# Patient Record
Sex: FEMALE | Race: WHITE | NOT HISPANIC OR LATINO | Employment: OTHER | URBAN - METROPOLITAN AREA
[De-identification: names, ages, dates, MRNs, and addresses within clinical notes are randomized per-mention and may not be internally consistent; named-entity substitution may affect disease eponyms.]

---

## 2017-01-13 ENCOUNTER — TRANSCRIBE ORDERS (OUTPATIENT)
Dept: ADMINISTRATIVE | Facility: HOSPITAL | Age: 62
End: 2017-01-13

## 2017-01-13 DIAGNOSIS — Z13.9 SCREENING: Primary | ICD-10-CM

## 2017-01-19 ENCOUNTER — HOSPITAL ENCOUNTER (OUTPATIENT)
Dept: CARDIOLOGY | Facility: HOSPITAL | Age: 62
Discharge: HOME | End: 2017-01-19
Admitting: INTERNAL MEDICINE

## 2017-01-19 VITALS
HEIGHT: 63 IN | BODY MASS INDEX: 40.93 KG/M2 | DIASTOLIC BLOOD PRESSURE: 84 MMHG | SYSTOLIC BLOOD PRESSURE: 153 MMHG | HEART RATE: 56 BPM | WEIGHT: 231 LBS

## 2017-01-19 DIAGNOSIS — Z13.9 SCREENING: ICD-10-CM

## 2017-01-19 LAB
BH CV ECHO MEAS - DIST AO DIAM: 1.51 CM
BH CV VAS BP LEFT ARM: NORMAL MMHG
BH CV VAS BP RIGHT ARM: NORMAL MMHG
BH CV XLRA MEAS - MID AO DIAM: 1.69 CM
BH CV XLRA MEAS - PAD LEFT ABI DP: 1.07
BH CV XLRA MEAS - PAD LEFT ABI PT: 1.09
BH CV XLRA MEAS - PAD LEFT ARM: 153 MMHG
BH CV XLRA MEAS - PAD LEFT LEG DP: 164 MMHG
BH CV XLRA MEAS - PAD LEFT LEG PT: 168 MMHG
BH CV XLRA MEAS - PAD RIGHT ABI DP: 1.08
BH CV XLRA MEAS - PAD RIGHT ABI PT: 1.11
BH CV XLRA MEAS - PAD RIGHT ARM: 153 MMHG
BH CV XLRA MEAS - PAD RIGHT LEG DP: 166 MMHG
BH CV XLRA MEAS - PAD RIGHT LEG PT: 170 MMHG
BH CV XLRA MEAS - PROX AO DIAM: 1.99 CM
BH CV XLRA MEAS LEFT ICA/CCA RATIO: 1.34
BH CV XLRA MEAS LEFT MID CCA PSV: NORMAL CM/SEC
BH CV XLRA MEAS LEFT MID ICA PSV: NORMAL CM/SEC
BH CV XLRA MEAS LEFT PROX ECA PSV: NORMAL CM/SEC
BH CV XLRA MEAS RIGHT ICA/CCA RATIO: 0.98
BH CV XLRA MEAS RIGHT MID CCA PSV: NORMAL CM/SEC
BH CV XLRA MEAS RIGHT MID ICA PSV: NORMAL CM/SEC
BH CV XLRA MEAS RIGHT PROX ECA PSV: NORMAL CM/SEC

## 2017-01-19 PROCEDURE — 93799 UNLISTED CV SVC/PROCEDURE: CPT

## 2017-02-16 ENCOUNTER — APPOINTMENT (OUTPATIENT)
Dept: WOMENS IMAGING | Facility: HOSPITAL | Age: 62
End: 2017-02-16

## 2017-02-16 PROCEDURE — 77067 SCR MAMMO BI INCL CAD: CPT | Performed by: RADIOLOGY

## 2017-08-21 ENCOUNTER — HOSPITAL ENCOUNTER (OUTPATIENT)
Dept: SLEEP MEDICINE | Facility: HOSPITAL | Age: 62
Discharge: HOME | End: 2017-08-21
Admitting: INTERNAL MEDICINE

## 2017-08-21 PROCEDURE — G0463 HOSPITAL OUTPT CLINIC VISIT: HCPCS

## 2017-08-27 NOTE — PROGRESS NOTES
Sleep Disorder Follow Up    Patient Name: Kelle Soliz  Age/Sex: 62 y.o. female  : 1955  MRN: [id number]    Date of Encounter Visit: 17  Referring Provider: Ame Dempsey MD  Place of Service: [facility]  Patient Care Team:  Ame Dempsey MD as PCP - General (Internal Medicine)  Kristie Johnson MD as Consulting Physician (Obstetrics and Gynecology)    PROBLEM LIST:  1. Obstructive sleep apnea, mild with severe positional component  2. Excessive daytime sleepiness improved on CPAP  3. Hypertension  4. Obesity with 10 pound weight gain    History of Present Illness:  Kelle Soliz is a 62 y.o. female who was last seen in the office on 2016 for follow-up on obstructive sleep apnea, hypersomnia, hypertension and obesity.  Since last visit she did have her blood pressure medication adjusted by her primary care physician.  She is also on antidepressant.   Lately she has had some issues with sinus infection and has had it problems wearing her CPAP machine.  Patient is on CPAP and uses it every night with no complaints from the mask or the pressure.  Patient uses a nasal mask and reports that it fits well. Patient denies any air leak or excessive dry mouth.   Patient's equipment supplier is [employer].  Patient sleeps better and has a deeper sleep with the CPAP and feels more energy. during the day time.  Current CPAP setting auto 6-20 cm H20.  ESS is 8.  Compliance download was NOT available for review today.  Weight is up 10 pounds since last visit.  Other comorbidities include hypertension and obesity     Review of Systems:   A ten-system review was conducted and was negative.   Please refer to the follow up sleep questionnaire page one for details.    Past Medical History:  Past medical, surgical, social, and family history, except as mentioned above, was unchanged from the last visit.     Past Medical History:   Diagnosis Date   • Hyperlipidemia    • Hypertension         No past surgical history on file.    Home Medications:   No current outpatient prescriptions on file.    Allergies:  Allergies not on file    Past Social History:  Social History     Social History   • Marital status:      Spouse name: N/A   • Number of children: N/A   • Years of education: N/A     Social History Main Topics   • Smoking status: Never Smoker   • Smokeless tobacco: Not on file   • Alcohol use Not on file   • Drug use: Not on file   • Sexual activity: Not on file     Other Topics Concern   • Not on file     Social History Narrative   • No narrative on file       Past Family History:  Family History   Problem Relation Age of Onset   • Heart disease Mother    • Hypertension Mother    • Diabetes Mother    • Heart disease Father    • Stroke Father      carotid artery disease   • Hyperlipidemia Father    • Heart disease Brother    • Diabetes Brother    • Hyperlipidemia Brother    • Hypertension Maternal Grandmother          Objective:   Done and documented on sleep disorders center physical examination sheet   VS: Ht: 62.5 inches, Wt: 230 lbs, BMI 42, /74, HR 56    Physical Exam:   General: AAOx3. Normal mood and affect.   HEENT:  Moist mucous membranes.  Septum midline. Mallampati 4 airways. Normal tongue and uvula. Normal tonsils.  LUNGS: Non-labored breathing. CTAB.  No wheezing  HEART: regular rate and rhythm. No murmur. Normal s1/s2  EXTREMITIES: no edema. No cyanosis or clubbing. Normal gait    Diagnostic Data:  Home sleep study showed respiratory event index of 11.5 that was up to 42.3 in the supine position.    Compliance download was available, but last year she was 96% compliant    Assessment and Plan:     1. Mild obstructive sleep apnea was severe positional component  2. Excessive daytime sleepiness improved on CPAP therapy  3. Hypertension- well controlled on medication  4. Obesity with recent 10 pound weight gain    Recommendations:     Patient was educated that compliance  download is needed to determine optimal control of obstructive sleep apnea.  Patient was encouraged to drop off card for compliance download and review to determine if any adjustments need to be made especially considering she has had a 10 pound weight gain.  Encourage weight loss as can help reduce symptoms of sleep apnea and improve overall health.  We'll go ahead and refill her supplies at this time.  Adherence to the PAP therapy is a key factor in successful treatment of DELIA and the patient was encouraged to contact us in case of problem with the CPAP or the mask that can limit the tolerance of the compliance with the therapy.    Follow up in one year or sooner if has any issues.     ABDIAZIZ Duran dictating for Dr. Boston Washburn  [facility]   08/27/17  5:48 PM    EMR Dragon/Transcription disclaimer:   Much of this encounter note is an electronic transcription/translation of spoken language to printed text. The electronic translation of spoken language may permit erroneous, or at times, nonsensical words or phrases to be inadvertently transcribed; Although I have reviewed the note for such errors, some may still exist.

## 2018-02-08 ENCOUNTER — PREP FOR SURGERY (OUTPATIENT)
Dept: OTHER | Facility: HOSPITAL | Age: 63
End: 2018-02-08

## 2018-02-08 DIAGNOSIS — Z86.010 HX OF COLONIC POLYPS: Primary | ICD-10-CM

## 2018-02-09 PROBLEM — Z86.010 HX OF COLONIC POLYPS: Status: ACTIVE | Noted: 2018-02-09

## 2018-02-09 PROBLEM — Z86.0100 HX OF COLONIC POLYPS: Status: ACTIVE | Noted: 2018-02-09

## 2018-02-22 ENCOUNTER — APPOINTMENT (OUTPATIENT)
Dept: WOMENS IMAGING | Facility: HOSPITAL | Age: 63
End: 2018-02-22

## 2018-02-22 PROCEDURE — 77067 SCR MAMMO BI INCL CAD: CPT | Performed by: RADIOLOGY

## 2018-03-06 ENCOUNTER — ANESTHESIA (OUTPATIENT)
Dept: GASTROENTEROLOGY | Facility: HOSPITAL | Age: 63
End: 2018-03-06

## 2018-03-06 ENCOUNTER — HOSPITAL ENCOUNTER (OUTPATIENT)
Facility: HOSPITAL | Age: 63
Setting detail: HOSPITAL OUTPATIENT SURGERY
Discharge: HOME | End: 2018-03-06
Attending: INTERNAL MEDICINE | Admitting: INTERNAL MEDICINE

## 2018-03-06 ENCOUNTER — ANESTHESIA EVENT (OUTPATIENT)
Dept: GASTROENTEROLOGY | Facility: HOSPITAL | Age: 63
End: 2018-03-06

## 2018-03-06 VITALS
DIASTOLIC BLOOD PRESSURE: 93 MMHG | RESPIRATION RATE: 18 BRPM | TEMPERATURE: 98 F | SYSTOLIC BLOOD PRESSURE: 174 MMHG | HEART RATE: 58 BPM | OXYGEN SATURATION: 98 % | HEIGHT: 63 IN | WEIGHT: 223.2 LBS | BODY MASS INDEX: 39.55 KG/M2

## 2018-03-06 DIAGNOSIS — Z86.010 HX OF COLONIC POLYPS: ICD-10-CM

## 2018-03-06 PROCEDURE — S0260 H&P FOR SURGERY: HCPCS | Performed by: INTERNAL MEDICINE

## 2018-03-06 PROCEDURE — 88305 TISSUE EXAM BY PATHOLOGIST: CPT | Performed by: INTERNAL MEDICINE

## 2018-03-06 PROCEDURE — 45380 COLONOSCOPY AND BIOPSY: CPT | Performed by: INTERNAL MEDICINE

## 2018-03-06 PROCEDURE — 25010000002 PROPOFOL 10 MG/ML EMULSION: Performed by: NURSE ANESTHETIST, CERTIFIED REGISTERED

## 2018-03-06 RX ORDER — PROPOFOL 10 MG/ML
VIAL (ML) INTRAVENOUS CONTINUOUS PRN
Status: DISCONTINUED | OUTPATIENT
Start: 2018-03-06 | End: 2018-03-06 | Stop reason: SURG

## 2018-03-06 RX ORDER — LABETALOL HYDROCHLORIDE 5 MG/ML
5 INJECTION, SOLUTION INTRAVENOUS
Status: DISCONTINUED | OUTPATIENT
Start: 2018-03-06 | End: 2018-03-06 | Stop reason: HOSPADM

## 2018-03-06 RX ORDER — DIPHENHYDRAMINE HYDROCHLORIDE 50 MG/ML
12.5 INJECTION INTRAMUSCULAR; INTRAVENOUS
Status: DISCONTINUED | OUTPATIENT
Start: 2018-03-06 | End: 2018-03-06 | Stop reason: HOSPADM

## 2018-03-06 RX ORDER — LIDOCAINE HYDROCHLORIDE 20 MG/ML
INJECTION, SOLUTION INFILTRATION; PERINEURAL AS NEEDED
Status: DISCONTINUED | OUTPATIENT
Start: 2018-03-06 | End: 2018-03-06 | Stop reason: SURG

## 2018-03-06 RX ORDER — PROPOFOL 10 MG/ML
VIAL (ML) INTRAVENOUS AS NEEDED
Status: DISCONTINUED | OUTPATIENT
Start: 2018-03-06 | End: 2018-03-06 | Stop reason: SURG

## 2018-03-06 RX ORDER — LEVOTHYROXINE SODIUM 88 UG/1
88 TABLET ORAL DAILY
COMMUNITY
End: 2020-10-01

## 2018-03-06 RX ORDER — SODIUM CHLORIDE 0.9 % (FLUSH) 0.9 %
3 SYRINGE (ML) INJECTION AS NEEDED
Status: DISCONTINUED | OUTPATIENT
Start: 2018-03-06 | End: 2018-03-06 | Stop reason: HOSPADM

## 2018-03-06 RX ORDER — OLMESARTAN MEDOXOMIL AND HYDROCHLOROTHIAZIDE 40/25 40; 25 MG/1; MG/1
1 TABLET ORAL DAILY
COMMUNITY
End: 2019-03-21

## 2018-03-06 RX ORDER — PROMETHAZINE HYDROCHLORIDE 25 MG/ML
12.5 INJECTION, SOLUTION INTRAMUSCULAR; INTRAVENOUS ONCE AS NEEDED
Status: DISCONTINUED | OUTPATIENT
Start: 2018-03-06 | End: 2018-03-06 | Stop reason: HOSPADM

## 2018-03-06 RX ORDER — FLUMAZENIL 0.1 MG/ML
0.2 INJECTION INTRAVENOUS AS NEEDED
Status: DISCONTINUED | OUTPATIENT
Start: 2018-03-06 | End: 2018-03-06 | Stop reason: HOSPADM

## 2018-03-06 RX ORDER — NALOXONE HCL 0.4 MG/ML
0.2 VIAL (ML) INJECTION AS NEEDED
Status: DISCONTINUED | OUTPATIENT
Start: 2018-03-06 | End: 2018-03-06 | Stop reason: HOSPADM

## 2018-03-06 RX ORDER — LIDOCAINE HYDROCHLORIDE 10 MG/ML
0.5 INJECTION, SOLUTION INFILTRATION; PERINEURAL ONCE AS NEEDED
Status: DISCONTINUED | OUTPATIENT
Start: 2018-03-06 | End: 2018-03-06 | Stop reason: HOSPADM

## 2018-03-06 RX ORDER — ROSUVASTATIN CALCIUM 40 MG/1
40 TABLET, COATED ORAL DAILY
COMMUNITY
End: 2019-03-21

## 2018-03-06 RX ORDER — PROMETHAZINE HYDROCHLORIDE 25 MG/1
25 TABLET ORAL ONCE AS NEEDED
Status: DISCONTINUED | OUTPATIENT
Start: 2018-03-06 | End: 2018-03-06 | Stop reason: HOSPADM

## 2018-03-06 RX ORDER — HYDRALAZINE HYDROCHLORIDE 20 MG/ML
5 INJECTION INTRAMUSCULAR; INTRAVENOUS
Status: DISCONTINUED | OUTPATIENT
Start: 2018-03-06 | End: 2018-03-06 | Stop reason: HOSPADM

## 2018-03-06 RX ORDER — BUPROPION HYDROCHLORIDE 300 MG/1
150 TABLET ORAL EVERY MORNING
COMMUNITY
End: 2020-06-17 | Stop reason: ALTCHOICE

## 2018-03-06 RX ORDER — MONTELUKAST SODIUM 10 MG/1
10 TABLET ORAL EVERY MORNING
COMMUNITY

## 2018-03-06 RX ORDER — SODIUM CHLORIDE, SODIUM LACTATE, POTASSIUM CHLORIDE, CALCIUM CHLORIDE 600; 310; 30; 20 MG/100ML; MG/100ML; MG/100ML; MG/100ML
1000 INJECTION, SOLUTION INTRAVENOUS CONTINUOUS PRN
Status: DISCONTINUED | OUTPATIENT
Start: 2018-03-06 | End: 2018-03-06 | Stop reason: HOSPADM

## 2018-03-06 RX ORDER — EPHEDRINE SULFATE 50 MG/ML
5 INJECTION, SOLUTION INTRAVENOUS ONCE AS NEEDED
Status: DISCONTINUED | OUTPATIENT
Start: 2018-03-06 | End: 2018-03-06 | Stop reason: HOSPADM

## 2018-03-06 RX ORDER — ONDANSETRON 2 MG/ML
4 INJECTION INTRAMUSCULAR; INTRAVENOUS ONCE AS NEEDED
Status: DISCONTINUED | OUTPATIENT
Start: 2018-03-06 | End: 2018-03-06 | Stop reason: HOSPADM

## 2018-03-06 RX ORDER — PROMETHAZINE HYDROCHLORIDE 25 MG/1
25 SUPPOSITORY RECTAL ONCE AS NEEDED
Status: DISCONTINUED | OUTPATIENT
Start: 2018-03-06 | End: 2018-03-06 | Stop reason: HOSPADM

## 2018-03-06 RX ORDER — PROMETHAZINE HYDROCHLORIDE 12.5 MG/1
12.5 TABLET ORAL ONCE AS NEEDED
Status: DISCONTINUED | OUTPATIENT
Start: 2018-03-06 | End: 2018-03-06 | Stop reason: HOSPADM

## 2018-03-06 RX ADMIN — PROPOFOL 50 MG: 10 INJECTION, EMULSION INTRAVENOUS at 13:37

## 2018-03-06 RX ADMIN — SODIUM CHLORIDE, POTASSIUM CHLORIDE, SODIUM LACTATE AND CALCIUM CHLORIDE: 600; 310; 30; 20 INJECTION, SOLUTION INTRAVENOUS at 13:33

## 2018-03-06 RX ADMIN — PROPOFOL 40 MG: 10 INJECTION, EMULSION INTRAVENOUS at 13:39

## 2018-03-06 RX ADMIN — SODIUM CHLORIDE, POTASSIUM CHLORIDE, SODIUM LACTATE AND CALCIUM CHLORIDE 1000 ML: 600; 310; 30; 20 INJECTION, SOLUTION INTRAVENOUS at 13:03

## 2018-03-06 RX ADMIN — LIDOCAINE HYDROCHLORIDE 60 MG: 20 INJECTION, SOLUTION INFILTRATION; PERINEURAL at 13:37

## 2018-03-06 RX ADMIN — PROPOFOL 120 MCG/KG/MIN: 10 INJECTION, EMULSION INTRAVENOUS at 13:38

## 2018-03-06 NOTE — PLAN OF CARE
Problem: Patient Care Overview (Adult)  Goal: Plan of Care Review  Outcome: Ongoing (interventions implemented as appropriate)   03/06/18 1303   Coping/Psychosocial Response Interventions   Plan Of Care Reviewed With patient   Patient Care Overview   Progress progress toward functional goals as expected     Goal: Adult Individualization and Mutuality  Outcome: Ongoing (interventions implemented as appropriate)   03/06/18 1303   Individualization   Patient Specific Preferences GEO     Goal: Discharge Needs Assessment  Outcome: Ongoing (interventions implemented as appropriate)   03/06/18 1303   Discharge Needs Assessment   Concerns To Be Addressed denies needs/concerns at this time   Discharge Disposition home or self-care   Living Environment   Transportation Available car       Problem: GI Endoscopy (Adult)  Goal: Signs and Symptoms of Listed Potential Problems Will be Absent or Manageable (GI Endoscopy)  Outcome: Ongoing (interventions implemented as appropriate)   03/06/18 1303   GI Endoscopy   Problems Assessed (GI Endoscopy) all   Problems Present (GI Endoscopy) none

## 2018-03-06 NOTE — ANESTHESIA POSTPROCEDURE EVALUATION
"Patient: Kelle Soliz    Procedure Summary     Date Anesthesia Start Anesthesia Stop Room / Location    03/06/18 1333 1358  [facility] /  [facility]       Procedure Diagnosis Surgeon Provider    COLONOSCOPY to terminal ileum with polypectomy (cold) (N/A ) Diverticulosis; Polyp of colon; Tortuous colon; Hemorrhoids  (Hx of colonic polyps [Z86.010]) MD Tarik Giron MD          Anesthesia Type: MAC  Last vitals  BP   165/89 (03/06/18 1408)   Temp   36.7 °C (98 °F) (03/06/18 1357)   Pulse   57 (03/06/18 1408)   Resp   18 (03/06/18 1408)     SpO2   98 % (03/06/18 1408)     Post Anesthesia Care and Evaluation    Patient location during evaluation: PACU  Patient participation: complete - patient participated  Level of consciousness: awake  Pain score: 0  Pain management: adequate  Airway patency: patent  Anesthetic complications: No anesthetic complications  PONV Status: none  Cardiovascular status: acceptable  Respiratory status: acceptable  Hydration status: acceptable    Comments: /89 (BP Location: Left arm, Patient Position: Lying)  Pulse 57  Temp 36.7 °C (98 °F) (Oral)   Resp 18  Ht 158.8 cm (62.5\")  Wt 101 kg (223 lb 3.2 oz)  SpO2 98%  BMI 40.17 kg/m2      "

## 2018-03-06 NOTE — ANESTHESIA PREPROCEDURE EVALUATION
Anesthesia Evaluation     Patient summary reviewed and Nursing notes reviewed                Airway   Mallampati: I  TM distance: <3 FB  Neck ROM: full  no difficulty expected  Dental - normal exam     Pulmonary - normal exam   (+) sleep apnea,   Cardiovascular - normal exam    (+) hypertension, hyperlipidemia,       Neuro/Psych- negative ROS  GI/Hepatic/Renal/Endo    (+)  GERD,      Musculoskeletal (-) negative ROS    Abdominal  - normal exam    Bowel sounds: normal.   Substance History - negative use     OB/GYN negative ob/gyn ROS         Other                        Anesthesia Plan    ASA 3     MAC     Anesthetic plan and risks discussed with patient.

## 2018-03-06 NOTE — H&P
[facility]  Pre Procedure History & Physical    Chief Complaint:   Family history of polyps, personal history of polyps    Subjective     HPI:   This 63-year-old female presents to the endoscopy suite for colonoscopic evaluation.  She has a personal history of polyps as well as a family history of polyps.  Last colonoscopy performed was in 2012    Past Medical History:   Past Medical History:   Diagnosis Date   • Disease of thyroid gland    • Diverticulosis    • GERD (gastroesophageal reflux disease)    • Hemorrhoid    • Hx of colonic polyp    • Hyperlipidemia    • Hypertension    • Sleep apnea        Past Surgical History:  Past Surgical History:   Procedure Laterality Date   •  SECTION     • COLONOSCOPY W/ POLYPECTOMY     • FOOT ARTHRODESIS, MODIFIED KHAN Left    • KNEE ARTHROPLASTY Right    • LAPAROSCOPIC CHOLECYSTECTOMY     • LAPAROSCOPIC OOPHORECTOMY Bilateral        Family History:  Family History   Problem Relation Age of Onset   • Heart disease Mother    • Hypertension Mother    • Diabetes Mother    • Heart disease Father    • Stroke Father      carotid artery disease   • Hyperlipidemia Father    • Heart disease Brother    • Diabetes Brother    • Hyperlipidemia Brother    • Hypertension Maternal Grandmother        Social History:   reports that she has never smoked. She does not have any smokeless tobacco history on file. She reports that she does not drink alcohol.    Medications:   Prescriptions Prior to Admission   Medication Sig Dispense Refill Last Dose   • buPROPion XL (WELLBUTRIN XL) 300 MG 24 hr tablet Take 300 mg by mouth Daily.   3/5/2018 at Unknown time   • levothyroxine (SYNTHROID, LEVOTHROID) 88 MCG tablet Take 88 mcg by mouth Daily.   3/5/2018 at Unknown time   • montelukast (SINGULAIR) 10 MG tablet Take 10 mg by mouth Every Night.   3/5/2018 at Unknown time   • Nebivolol HCl (BYSTOLIC PO) Take 40 mg by mouth Daily.   3/6/2018 at Unknown time   •  "olmesartan-hydrochlorothiazide (BENICAR HCT) 40-25 MG per tablet Take 1 tablet by mouth Daily.   3/6/2018 at Unknown time   • rosuvastatin (CRESTOR) 40 MG tablet Take 40 mg by mouth Daily.   3/5/2018 at Unknown time       Allergies:  Review of patient's allergies indicates no known allergies.    ROS:    Pertinent items are noted in HPI, all other systems reviewed and negative     Objective     Blood pressure 150/84, pulse 61, temperature 98 °F (36.7 °C), temperature source Oral, resp. rate 16, height 158.8 cm (62.5\"), weight 101 kg (223 lb 3.2 oz), SpO2 95 %.    Physical Exam   Constitutional: Pt is oriented to person, place, and time and well-developed, well-nourished, and in no distress.   Mouth/Throat: Oropharynx is clear and moist.   Neck: Normal range of motion.   Cardiovascular: Normal rate, regular rhythm and normal heart sounds.    Pulmonary/Chest: Effort normal and breath sounds normal.   Abdominal: Soft. Nontender  Skin: Skin is warm and dry.   Psychiatric: Mood, memory, affect and judgment normal.     Assessment/Plan     Diagnosis:  Polyps  Family history    Anticipated Surgical Procedure:  Colonoscopy    The risks, benefits, and alternatives of this procedure have been discussed with the patient or the responsible party- the patient understands and agrees to proceed.                                                          "

## 2018-03-06 NOTE — DISCHARGE INSTRUCTIONS
For the next 24 hours patient needs to be with a responsible adult.    For 24 hours DO NOT drive, operate machinery, appliances, drink alcohol, make important decisions or sign legal documents.    Start with a light or bland diet and advance to regular diet as tolerated.    Follow recommendations on procedure report provided by your doctor.    Call Dr choi for problems [phone]    Problems may include but not limited to: large amounts of bleeding, trouble breathing, repeated vomiting, severe unrelieved pain, fever or chills.

## 2018-03-07 LAB
CYTO UR: NORMAL
LAB AP CASE REPORT: NORMAL
Lab: NORMAL
PATH REPORT.FINAL DX SPEC: NORMAL
PATH REPORT.GROSS SPEC: NORMAL

## 2018-03-15 ENCOUNTER — TELEPHONE (OUTPATIENT)
Dept: GASTROENTEROLOGY | Facility: CLINIC | Age: 63
End: 2018-03-15

## 2018-03-15 NOTE — TELEPHONE ENCOUNTER
Call to pt.  Advise per path report that polyp that was removed was not cancerous, but precancerous.    Advise per Dr Blackmon that recommend f/u c/s in 3-5 yrs.  Office f/u sooner as needed.  PT verb understanding.    C/s for 3/6/21 placed in recall.

## 2018-03-15 NOTE — TELEPHONE ENCOUNTER
----- Message from Eddie ELIZABETH MD sent at 3/7/2018  4:50 PM EST -----  Regarding: Biopsy results  K call results, recommend follow-up colonoscopy in 3-5 years.  Office follow up sooner as needed.  ----- Message -----     From: Lab, Background User     Sent: 3/7/2018  10:47 AM       To: Eddie ELIZABETH MD

## 2018-09-17 ENCOUNTER — OFFICE VISIT (OUTPATIENT)
Dept: SLEEP MEDICINE | Facility: HOSPITAL | Age: 63
End: 2018-09-17
Attending: INTERNAL MEDICINE

## 2018-09-17 VITALS
HEIGHT: 63 IN | BODY MASS INDEX: 39.55 KG/M2 | SYSTOLIC BLOOD PRESSURE: 151 MMHG | WEIGHT: 223.2 LBS | DIASTOLIC BLOOD PRESSURE: 79 MMHG | HEART RATE: 61 BPM | OXYGEN SATURATION: 96 %

## 2018-09-17 DIAGNOSIS — Z99.89 OSA ON CPAP: Primary | ICD-10-CM

## 2018-09-17 DIAGNOSIS — G47.10 HYPERSOMNIA WITH SLEEP APNEA: ICD-10-CM

## 2018-09-17 DIAGNOSIS — G47.30 HYPERSOMNIA WITH SLEEP APNEA: ICD-10-CM

## 2018-09-17 DIAGNOSIS — I10 ESSENTIAL HYPERTENSION: ICD-10-CM

## 2018-09-17 DIAGNOSIS — G47.33 OSA ON CPAP: Primary | ICD-10-CM

## 2018-09-17 DIAGNOSIS — E66.9 OBESITY (BMI 30-39.9): ICD-10-CM

## 2018-09-17 PROBLEM — R07.9 CHEST PAIN: Status: ACTIVE | Noted: 2018-09-17

## 2018-09-17 PROCEDURE — G0463 HOSPITAL OUTPT CLINIC VISIT: HCPCS

## 2018-09-17 RX ORDER — BUPROPION HYDROCHLORIDE 150 MG/1
TABLET ORAL
COMMUNITY
Start: 2018-08-03 | End: 2019-03-21

## 2018-09-17 RX ORDER — AZILSARTAN KAMEDOXOMIL AND CHLORTHALIDONE 40; 25 MG/1; MG/1
1 TABLET ORAL EVERY MORNING
Refills: 7 | COMMUNITY
Start: 2018-08-15 | End: 2020-05-03 | Stop reason: HOSPADM

## 2018-09-17 RX ORDER — ALPRAZOLAM 0.25 MG/1
0.25 TABLET ORAL AS NEEDED
COMMUNITY
Start: 2018-08-03 | End: 2020-06-17 | Stop reason: ALTCHOICE

## 2018-09-17 NOTE — PROGRESS NOTES
Sleep Disorder Follow Up    Patient Name: Kelle Soliz  Age/Sex: 63 y.o. female  : 1955  MRN: [id number]    Date of Encounter Visit: 18  Referring Provider: Boston Washburn MD  Place of Service: [facility]  Patient Care Team:  Ame Dempsey MD as PCP - General (Internal Medicine)  Kristie Johnson MD as Consulting Physician (Obstetrics and Gynecology)    PROBLEM LIST:  1. Mild DELIA with severe positional component on CPAP  1. Excessive daytime sleepiness improved on CPAP  2. Hypertension  3. Obesity     History of Present Illness:  Kelle Soliz is a 63 y.o. female who is here for follow up on DELIA and daytime sleepiness.   Patient last seen in the office on 17 and no changes were made.she had a home sleep study in 2016 that showed an overall AHI of 11.5 that increased to 42 while in the supine position. She was treated with auto CPAP 6-20 cmH20.     Since last visit, she denies any changes in her medical history.  Patient uses machine every night with no complaints from the mask or the pressure.  Patient uses a under the nose mask, which does fit well. Patient denies any air leak. Denies any dry mouth.   Patient's equipment supplier is [employer].  Patient sleeps better and has a deeper sleep with the machine and feels more energy during the day time.  Currently on CPAP 6-20 cm H20.  [place] Sleepiness Scale (ESS) is 8.  Compliance download was not available for review today  Weight is down 7 pounds since last visit.  Other comorbidities include HTN and obesity.     Review of Systems:   A ten-system review was conducted and was negative except for anxiety.   Please refer to the follow up sleep questionnaire page one for details.    Past Medical History:  Past medical, surgical, social, and family history, except as mentioned above, was unchanged from the last visit.     Past Medical History:   Diagnosis Date   • Disease of thyroid gland    • Diverticulosis    • GERD  (gastroesophageal reflux disease)    • Hemorrhoid    • Hx of colonic polyp    • Hyperlipidemia    • Hypertension    • Sleep apnea        Past Surgical History:   Procedure Laterality Date   •  SECTION     • COLONOSCOPY N/A 3/6/2018    Procedure: COLONOSCOPY to terminal ileum with polypectomy (cold);  Surgeon: Eddie ELIZABETH MD;  Location: [facility] END[facility];  Service:    • COLONOSCOPY W/ POLYPECTOMY     • FOOT ARTHRODESIS, MODIFIED KHAN Left    • KNEE ARTHROPLASTY Right    • LAPAROSCOPIC CHOLECYSTECTOMY     • LAPAROSCOPIC OOPHORECTOMY Bilateral        Home Medications:     Current Outpatient Prescriptions:   •  ALPRAZolam (XANAX) 0.25 MG tablet, , Disp: , Rfl:   •  buPROPion XL (WELLBUTRIN XL) 150 MG 24 hr tablet, , Disp: , Rfl:   •  buPROPion XL (WELLBUTRIN XL) 300 MG 24 hr tablet, Take 300 mg by mouth Daily., Disp: , Rfl:   •  EDARBYCLOR 40-25 MG tablet, , Disp: , Rfl: 7  •  levothyroxine (SYNTHROID, LEVOTHROID) 88 MCG tablet, Take 88 mcg by mouth Daily., Disp: , Rfl:   •  montelukast (SINGULAIR) 10 MG tablet, Take 10 mg by mouth Every Night., Disp: , Rfl:   •  Nebivolol HCl (BYSTOLIC PO), Take 40 mg by mouth Daily., Disp: , Rfl:   •  olmesartan-hydrochlorothiazide (BENICAR HCT) 40-25 MG per tablet, Take 1 tablet by mouth Daily., Disp: , Rfl:   •  rosuvastatin (CRESTOR) 40 MG tablet, Take 40 mg by mouth Daily., Disp: , Rfl:   •  sertraline (ZOLOFT) 50 MG tablet, , Disp: , Rfl:     Allergies:  No Known Allergies    Past Social History:  Social History     Social History   • Marital status:      Social History Main Topics   • Smoking status: Never Smoker   • Alcohol use No   • Drug use: Unknown     Other Topics Concern   • Not on file       Past Family History:  Family History   Problem Relation Age of Onset   • Heart disease Mother    • Hypertension Mother    • Diabetes Mother    • Heart disease Father    • Stroke Father         carotid artery disease   • Hyperlipidemia Father    • Heart  "disease Brother    • Diabetes Brother    • Hyperlipidemia Brother    • Hypertension Maternal Grandmother          Objective:   Done and documented on sleep disorders center physical examination sheet, please refer to hand written note on the chart for details about the other pertinent negative findings.    Vital Signs:   Visit Vitals  /79 (BP Location: Left arm, Patient Position: Sitting, Cuff Size: Adult)   Pulse 61   Ht 158.8 cm (62.5\")   Wt 101 kg (223 lb 3.2 oz)   SpO2 96%   BMI 40.17 kg/m²     Wt Readings from Last 3 Encounters:   09/17/18 101 kg (223 lb 3.2 oz)   03/06/18 101 kg (223 lb 3.2 oz)   01/19/17 105 kg (231 lb)     Neck Circumference: 16.25 inches    Physical Exam:   General: AAOx3. Normal mood and affect.   HEENT:  Moist mucous membranes.  Septum midline. Mallampati 4 airway. Enlarged tongue. redundant lateral pharyngeal tissues.   LUNGS: Non-labored breathing. CTAB. No wheezes.  HEART: Regular rate and rhythm. No murmur. Normal s1/s2  EXTREMITIES: no edema. No cyanosis or clubbing. Normal gait    Diagnostic Data:  Home sleep study showed respiratory event index of 11.5 that was up to 42.3 in the supine position.    Compliance download was not available today      Assessment and Plan:       ICD-10-CM ICD-9-CM   1. DELIA on CPAP G47.33 327.23    Z99.89 V46.8   2. Hypersomnia with sleep apnea G47.10 780.53    G47.30    3. Essential hypertension I10 401.9   4. Obesity (BMI 30-39.9) E66.9 278.00       Recommendations:     She reports that overall she is fairly compliant with her machine, but did not use for the last 2 weeks while she was on a cruise.  She did not bring her SD card today with compliance download information and is not on a modem.     Will continue CPAP at same settings for now an auto 6-20 cm H2O.  Encouraged and improve compliance.  Return with compliance download information in the next month.    Otherwise for new supplies.  Encourage proper cleaning and maintenance of mask and " equipment.    Is educated in depth about obstructive sleep apnea in the cardiovascular consequences of untreated or poorly treated sleep apnea.    Encouraged to continue to monitor blood pressure, reduce salt intake.  Continue by systolic and Benicar.  Follow-up with PCP.    No orders of the defined types were placed in this encounter.    No orders of the defined types were placed in this encounter.    Return in about 1 year (around 9/17/2019).    ABDIAZIZ Duran   [facility]   09/17/18  11:41 AM    Dictated utilizing Dragon dictation

## 2019-03-04 ENCOUNTER — APPOINTMENT (OUTPATIENT)
Dept: WOMENS IMAGING | Facility: HOSPITAL | Age: 64
End: 2019-03-04

## 2019-03-04 PROCEDURE — 77063 BREAST TOMOSYNTHESIS BI: CPT | Performed by: RADIOLOGY

## 2019-03-04 PROCEDURE — 77067 SCR MAMMO BI INCL CAD: CPT | Performed by: RADIOLOGY

## 2019-03-15 ENCOUNTER — APPOINTMENT (OUTPATIENT)
Dept: PREADMISSION TESTING | Facility: HOSPITAL | Age: 64
End: 2019-03-15

## 2019-03-21 ENCOUNTER — HOSPITAL ENCOUNTER (OUTPATIENT)
Dept: GENERAL RADIOLOGY | Facility: HOSPITAL | Age: 64
Discharge: HOME | End: 2019-03-21

## 2019-03-21 ENCOUNTER — APPOINTMENT (OUTPATIENT)
Dept: PREADMISSION TESTING | Facility: HOSPITAL | Age: 64
End: 2019-03-21

## 2019-03-21 ENCOUNTER — HOSPITAL ENCOUNTER (OUTPATIENT)
Dept: GENERAL RADIOLOGY | Facility: HOSPITAL | Age: 64
Discharge: HOME | End: 2019-03-21
Admitting: ORTHOPAEDIC SURGERY

## 2019-03-21 VITALS
OXYGEN SATURATION: 98 % | HEIGHT: 63 IN | RESPIRATION RATE: 20 BRPM | DIASTOLIC BLOOD PRESSURE: 85 MMHG | TEMPERATURE: 97.8 F | WEIGHT: 228 LBS | BODY MASS INDEX: 40.4 KG/M2 | SYSTOLIC BLOOD PRESSURE: 128 MMHG | HEART RATE: 60 BPM

## 2019-03-21 LAB
ALBUMIN SERPL-MCNC: 4.3 G/DL (ref 3.5–5.2)
ALBUMIN/GLOB SERPL: 1.7 G/DL
ALP SERPL-CCNC: 66 U/L (ref 39–117)
ALT SERPL W P-5'-P-CCNC: 23 U/L (ref 1–33)
ANION GAP SERPL CALCULATED.3IONS-SCNC: 15 MMOL/L
APTT PPP: 35.5 SECONDS (ref 22.7–35.4)
AST SERPL-CCNC: 19 U/L (ref 1–32)
BACTERIA UR QL AUTO: NORMAL /HPF
BASOPHILS # BLD AUTO: 0.03 10*3/MM3 (ref 0–0.2)
BASOPHILS NFR BLD AUTO: 0.4 % (ref 0–1.5)
BILIRUB SERPL-MCNC: 0.6 MG/DL (ref 0.2–1.2)
BILIRUB UR QL STRIP: NEGATIVE
BUN BLD-MCNC: 11 MG/DL (ref 8–23)
BUN/CREAT SERPL: 13.3 (ref 7–25)
CALCIUM SPEC-SCNC: 9.6 MG/DL (ref 8.6–10.5)
CHLORIDE SERPL-SCNC: 90 MMOL/L (ref 98–107)
CLARITY UR: CLEAR
CO2 SERPL-SCNC: 28 MMOL/L (ref 22–29)
COLOR UR: YELLOW
CREAT BLD-MCNC: 0.83 MG/DL (ref 0.57–1)
DEPRECATED RDW RBC AUTO: 39.6 FL (ref 37–54)
EOSINOPHIL # BLD AUTO: 0.12 10*3/MM3 (ref 0–0.4)
EOSINOPHIL NFR BLD AUTO: 1.5 % (ref 0.3–6.2)
ERYTHROCYTE [DISTWIDTH] IN BLOOD BY AUTOMATED COUNT: 11.9 % (ref 12.3–15.4)
GFR SERPL CREATININE-BSD FRML MDRD: 69 ML/MIN/1.73
GLOBULIN UR ELPH-MCNC: 2.6 GM/DL
GLUCOSE BLD-MCNC: 102 MG/DL (ref 65–99)
GLUCOSE UR STRIP-MCNC: NEGATIVE MG/DL
HCT VFR BLD AUTO: 41.4 % (ref 34–46.6)
HGB BLD-MCNC: 14.2 G/DL (ref 12–15.9)
HGB UR QL STRIP.AUTO: NEGATIVE
HYALINE CASTS UR QL AUTO: NORMAL /LPF
IMM GRANULOCYTES # BLD AUTO: 0.05 10*3/MM3 (ref 0–0.05)
IMM GRANULOCYTES NFR BLD AUTO: 0.6 % (ref 0–0.5)
INR PPP: 1.04 (ref 0.9–1.1)
KETONES UR QL STRIP: NEGATIVE
LEUKOCYTE ESTERASE UR QL STRIP.AUTO: NEGATIVE
LYMPHOCYTES # BLD AUTO: 1.97 10*3/MM3 (ref 0.7–3.1)
LYMPHOCYTES NFR BLD AUTO: 23.9 % (ref 19.6–45.3)
MCH RBC QN AUTO: 31 PG (ref 26.6–33)
MCHC RBC AUTO-ENTMCNC: 34.3 G/DL (ref 31.5–35.7)
MCV RBC AUTO: 90.4 FL (ref 79–97)
MONOCYTES # BLD AUTO: 0.72 10*3/MM3 (ref 0.1–0.9)
MONOCYTES NFR BLD AUTO: 8.7 % (ref 5–12)
NEUTROPHILS # BLD AUTO: 5.36 10*3/MM3 (ref 1.4–7)
NEUTROPHILS NFR BLD AUTO: 64.9 % (ref 42.7–76)
NITRITE UR QL STRIP: NEGATIVE
NRBC BLD AUTO-RTO: 0 /100 WBC (ref 0–0)
PH UR STRIP.AUTO: 8 [PH] (ref 5–8)
PLATELET # BLD AUTO: 212 10*3/MM3 (ref 140–450)
PMV BLD AUTO: 9.9 FL (ref 6–12)
POTASSIUM BLD-SCNC: 3.8 MMOL/L (ref 3.5–5.2)
PROT SERPL-MCNC: 6.9 G/DL (ref 6–8.5)
PROT UR QL STRIP: NEGATIVE
PROTHROMBIN TIME: 13.4 SECONDS (ref 11.7–14.2)
RBC # BLD AUTO: 4.58 10*6/MM3 (ref 3.77–5.28)
RBC # UR: NORMAL /HPF
REF LAB TEST METHOD: NORMAL
SODIUM BLD-SCNC: 133 MMOL/L (ref 136–145)
SP GR UR STRIP: 1.01 (ref 1–1.03)
SQUAMOUS #/AREA URNS HPF: NORMAL /HPF
UROBILINOGEN UR QL STRIP: NORMAL
WBC NRBC COR # BLD: 8.25 10*3/MM3 (ref 3.4–10.8)
WBC UR QL AUTO: NORMAL /HPF

## 2019-03-21 PROCEDURE — 81001 URINALYSIS AUTO W/SCOPE: CPT | Performed by: ORTHOPAEDIC SURGERY

## 2019-03-21 PROCEDURE — 80053 COMPREHEN METABOLIC PANEL: CPT | Performed by: ORTHOPAEDIC SURGERY

## 2019-03-21 PROCEDURE — 85025 COMPLETE CBC W/AUTO DIFF WBC: CPT | Performed by: ORTHOPAEDIC SURGERY

## 2019-03-21 PROCEDURE — 85610 PROTHROMBIN TIME: CPT | Performed by: ORTHOPAEDIC SURGERY

## 2019-03-21 PROCEDURE — 93005 ELECTROCARDIOGRAM TRACING: CPT

## 2019-03-21 PROCEDURE — 71046 X-RAY EXAM CHEST 2 VIEWS: CPT

## 2019-03-21 PROCEDURE — 36415 COLL VENOUS BLD VENIPUNCTURE: CPT

## 2019-03-21 PROCEDURE — 73560 X-RAY EXAM OF KNEE 1 OR 2: CPT

## 2019-03-21 PROCEDURE — 93010 ELECTROCARDIOGRAM REPORT: CPT | Performed by: INTERNAL MEDICINE

## 2019-03-21 PROCEDURE — 85730 THROMBOPLASTIN TIME PARTIAL: CPT | Performed by: ORTHOPAEDIC SURGERY

## 2019-03-21 RX ORDER — MELATONIN
1000 DAILY
COMMUNITY
End: 2020-05-28

## 2019-03-21 RX ORDER — CHLORHEXIDINE GLUCONATE 500 MG/1
1 CLOTH TOPICAL
Status: ON HOLD | COMMUNITY
End: 2019-03-28

## 2019-03-21 RX ORDER — ESTRADIOL 0.1 MG/G
2 CREAM VAGINAL WEEKLY
COMMUNITY
End: 2020-05-03 | Stop reason: HOSPADM

## 2019-03-21 RX ORDER — ROSUVASTATIN CALCIUM 40 MG/1
20 TABLET, COATED ORAL DAILY
COMMUNITY

## 2019-03-21 RX ORDER — RANITIDINE 150 MG/1
150 TABLET ORAL DAILY PRN
COMMUNITY
End: 2020-04-25 | Stop reason: SDUPTHER

## 2019-03-21 RX ORDER — ACETAMINOPHEN 500 MG
1000 TABLET ORAL EVERY 6 HOURS PRN
COMMUNITY
End: 2020-05-03 | Stop reason: HOSPADM

## 2019-03-21 ASSESSMENT — KOOS JR
KOOS JR SCORE: 47.487
KOOS JR SCORE: 16

## 2019-03-21 NOTE — DISCHARGE INSTRUCTIONS
2% CHLORAHEXIDINE GLUCONATE* CLOTH  Preparing or “prepping” skin before surgery can reduce the risk of infection at the surgical site. To make the process easier, [facility] has chosen disposable cloths moistened with a rinse-free, 2% Chlorhexidine Gluconate (CHG) antiseptic solution. The steps below outline the prepping process and should be carefully followed.        Use the prep cloth on the area that is circled in the diagram             Directions Night before Surgery  1) Shower using a fresh bar of anti-bacterial soap (such as Dial) and clean washcloth.  Use a clean towel to completely dry your skin.  2) Do not use any lotions, oils or creams on your skin.  3) Open the package and remove 1 cloth, wipe your skin for 30 seconds in a circular motion.  Allow to dry for 3 minutes.  4) Repeat #3 with second cloth.  5) Do not touch your eyes, ears, or mouth with the prep cloth.  6) Allow the wet prep solution to air dry.  7) Discard the prep cloth and wash your hands with soap and water.   8) Dress in clean bed clothes and sleep on fresh clean bed sheets.   9) You may experience some temporary itching after the prep.    Directions Day of Surgery  1) Repeat steps 1,2,3,4,5,6,7, and 9.   2) Dress in clean clothes before coming to the hospital.    BACTROBAN NASAL OINTMENT  There are many germs normally in your nose. Bactroban is an ointment that will help reduce these germs. Please follow these instructions for Bactroban use:      __1__The day before surgery in the morning  Date_3/27/19_______    __2__The day before surgery in the evening              Date__3/27/19______    __3__The day of surgery in the morning    Date__3/28/19______    **Squirt ½ package of Bactroban Ointment onto a cotton applicator and apply to inside of 1st nostril.  Squirt the remaining Bactroban and apply to the inside of the other nostril.    PERIDEX- ORAL:  Use only if your surgeon has ordered  Use the night before and morning  of surgery - Swish, gargle, and spit - do not swallow.Take the following medications the morning of surgery with a small sip of water:        General Instructions:  • Do not eat solid food after midnight the night before surgery.  • You may drink clear liquids day of surgery but must stop at least one hour before your hospital arrival time.  • It is beneficial for you to have a clear drink that contains carbohydrates the day of surgery.  We suggest a 12 to 20 ounce bottle of Gatorade or Powerade for non-diabetic patients or a 12 to 20 ounce bottle of G2 or Powerade Zero for diabetic patients. (Pediatric patients, are not advised to drink a 12 to 20 ounce carbohydrate drink)    Clear liquids are liquids you can see through.  Nothing red in color.     Plain water                               Sports drinks  Sodas                                   Gelatin (Jell-O)  Fruit juices without pulp such as white grape juice and apple juice  Popsicles that contain no fruit or yogurt  Tea or coffee (no cream or milk added)  Gatorade / Powerade  G2 / Powerade Zero    • Infants may have breast milk up to four hours before surgery.  • Infants drinking formula may drink formula up to six hours before surgery.   • Patients who avoid smoking, chewing tobacco and alcohol for 4 weeks prior to surgery have a reduced risk of post-operative complications.  Quit smoking as many days before surgery as you can.  • Do not smoke, use chewing tobacco or drink alcohol the day of surgery.   • If applicable bring your C-PAP/ BI-PAP machine.  • Bring any papers given to you in the doctor’s office.  • Wear clean comfortable clothes and socks.  • Do not wear contact lenses or make-up.  Bring a case for your glasses.   • Bring crutches or walker if applicable.  • Remove all piercings.  Leave jewelry and any other valuables at home.  • Hair extensions with metal clips must be removed prior to surgery.  • The Pre-Admission Testing nurse will instruct you  to bring medications if unable to obtain an accurate list in Pre-Admission Testing.        If you were given a blood bank ID arm band remember to bring it with you the day of surgery.    Preventing a Surgical Site Infection:  • For 2 to 3 days before surgery, avoid shaving with a razor because the razor can irritate skin and make it easier to develop an infection.    • Any areas of open skin can increase the risk of a post-operative wound infection by allowing bacteria to enter and travel throughout the body.  Notify your surgeon if you have any skin wounds / rashes even if it is not near the expected surgical site.  The area will need assessed to determine if surgery should be delayed until it is healed.  • The night prior to surgery sleep in a clean bed with clean clothing.  Do not allow pets to sleep with you.  • Shower on the morning of surgery using a fresh bar of anti-bacterial soap (such as Dial) and clean washcloth.  Dry with a clean towel and dress in clean clothing.  • Ask your surgeon if you will be receiving antibiotics prior to surgery.  • Make sure you, your family, and all healthcare providers clean their hands with soap and water or an alcohol based hand  before caring for you or your wound.    Day of surgery:3/28/19   0530  Upon arrival, a Pre-op nurse and Anesthesiologist will review your health history, obtain vital signs, and answer questions you may have.  The only belongings needed at this time will be your home medications and if applicable your C-PAP/BI-PAP machine.  If you are staying overnight your family can leave the rest of your belongings in the car and bring them to your room later.  A Pre-op nurse will start an IV and you may receive medication in preparation for surgery, including something to help you relax.  Your family will be able to see you in the Pre-op area.  While you are in surgery your family should notify the waiting room  if they leave the waiting  room area and provide a contact phone number.    Please be aware that surgery does come with discomfort.  We want to make every effort to control your discomfort so please discuss any uncontrolled symptoms with your nurse.   Your doctor will most likely have prescribed pain medications.      If you are going home after surgery you will receive individualized written care instructions before being discharged.  A responsible adult must drive you to and from the hospital on the day of your surgery and stay with you for 24 hours.    If you are staying overnight following surgery, you will be transported to your hospital room following the recovery period.  [facility] has all private rooms.    You have received a list of surgical assistants for your reference.  If you have any questions please call Pre-Admission Testing at [phone].  Deductibles and co-payments are collected on the day of service. Please be prepared to pay the required co-pay, deductible or deposit on the day of service as defined by your plan.

## 2019-03-28 ENCOUNTER — ANESTHESIA EVENT (OUTPATIENT)
Dept: PERIOP | Facility: HOSPITAL | Age: 64
End: 2019-03-28

## 2019-03-28 ENCOUNTER — ANESTHESIA (OUTPATIENT)
Dept: PERIOP | Facility: HOSPITAL | Age: 64
End: 2019-03-28

## 2019-03-28 ENCOUNTER — HOSPITAL ENCOUNTER (INPATIENT)
Facility: HOSPITAL | Age: 64
LOS: 1 days | Discharge: HOME | End: 2019-03-29
Attending: ORTHOPAEDIC SURGERY | Admitting: ORTHOPAEDIC SURGERY

## 2019-03-28 ENCOUNTER — APPOINTMENT (OUTPATIENT)
Dept: GENERAL RADIOLOGY | Facility: HOSPITAL | Age: 64
End: 2019-03-28

## 2019-03-28 DIAGNOSIS — M17.12 PRIMARY OSTEOARTHRITIS OF LEFT KNEE: ICD-10-CM

## 2019-03-28 DIAGNOSIS — R26.2 DIFFICULTY WALKING: Primary | ICD-10-CM

## 2019-03-28 PROBLEM — M17.9 DJD (DEGENERATIVE JOINT DISEASE) OF KNEE: Status: ACTIVE | Noted: 2019-03-28

## 2019-03-28 LAB — GLUCOSE BLDC GLUCOMTR-MCNC: 138 MG/DL (ref 70–130)

## 2019-03-28 PROCEDURE — 25010000003 BUPIVACAINE LIPOSOME 1.3 % SUSPENSION 20 ML VIAL: Performed by: ORTHOPAEDIC SURGERY

## 2019-03-28 PROCEDURE — 25010000002 SUCCINYLCHOLINE PER 20 MG: Performed by: NURSE ANESTHETIST, CERTIFIED REGISTERED

## 2019-03-28 PROCEDURE — 97110 THERAPEUTIC EXERCISES: CPT

## 2019-03-28 PROCEDURE — 25010000003 CEFAZOLIN IN DEXTROSE 2-4 GM/100ML-% SOLUTION: Performed by: NURSE ANESTHETIST, CERTIFIED REGISTERED

## 2019-03-28 PROCEDURE — 25010000002 PHENYLEPHRINE PER 1 ML: Performed by: NURSE ANESTHETIST, CERTIFIED REGISTERED

## 2019-03-28 PROCEDURE — 0SRD0J9 REPLACEMENT OF LEFT KNEE JOINT WITH SYNTHETIC SUBSTITUTE, CEMENTED, OPEN APPROACH: ICD-10-PCS | Performed by: ORTHOPAEDIC SURGERY

## 2019-03-28 PROCEDURE — 25010000002 HYDROMORPHONE PER 4 MG: Performed by: NURSE ANESTHETIST, CERTIFIED REGISTERED

## 2019-03-28 PROCEDURE — 25010000003 CEFAZOLIN IN DEXTROSE 2-4 GM/100ML-% SOLUTION: Performed by: ORTHOPAEDIC SURGERY

## 2019-03-28 PROCEDURE — 97161 PT EVAL LOW COMPLEX 20 MIN: CPT

## 2019-03-28 PROCEDURE — 25010000002 METOCLOPRAMIDE PER 10 MG: Performed by: ANESTHESIOLOGY

## 2019-03-28 PROCEDURE — C9290 INJ, BUPIVACAINE LIPOSOME: HCPCS | Performed by: ORTHOPAEDIC SURGERY

## 2019-03-28 PROCEDURE — 25010000002 PROPOFOL 10 MG/ML EMULSION: Performed by: NURSE ANESTHETIST, CERTIFIED REGISTERED

## 2019-03-28 PROCEDURE — 25010000002 DEXAMETHASONE PER 1 MG: Performed by: NURSE ANESTHETIST, CERTIFIED REGISTERED

## 2019-03-28 PROCEDURE — 25010000002 NEOSTIGMINE PER 0.5 MG: Performed by: NURSE ANESTHETIST, CERTIFIED REGISTERED

## 2019-03-28 PROCEDURE — C1776 JOINT DEVICE (IMPLANTABLE): HCPCS | Performed by: ORTHOPAEDIC SURGERY

## 2019-03-28 PROCEDURE — 25010000002 ONDANSETRON PER 1 MG: Performed by: NURSE ANESTHETIST, CERTIFIED REGISTERED

## 2019-03-28 PROCEDURE — 73560 X-RAY EXAM OF KNEE 1 OR 2: CPT

## 2019-03-28 PROCEDURE — C1713 ANCHOR/SCREW BN/BN,TIS/BN: HCPCS | Performed by: ORTHOPAEDIC SURGERY

## 2019-03-28 PROCEDURE — 94799 UNLISTED PULMONARY SVC/PX: CPT

## 2019-03-28 PROCEDURE — 25010000002 KETOROLAC TROMETHAMINE PER 15 MG: Performed by: ORTHOPAEDIC SURGERY

## 2019-03-28 PROCEDURE — 82962 GLUCOSE BLOOD TEST: CPT

## 2019-03-28 PROCEDURE — 25010000002 FENTANYL CITRATE (PF) 100 MCG/2ML SOLUTION: Performed by: NURSE ANESTHETIST, CERTIFIED REGISTERED

## 2019-03-28 PROCEDURE — 25010000002 MIDAZOLAM PER 1 MG: Performed by: ANESTHESIOLOGY

## 2019-03-28 PROCEDURE — 25010000002 VANCOMYCIN 10 G RECONSTITUTED SOLUTION: Performed by: ORTHOPAEDIC SURGERY

## 2019-03-28 DEVICE — JOURNEY TIBIAL BASEPLATE NONPOROUS                                    LEFT SIZE 3
Type: IMPLANTABLE DEVICE | Site: KNEE | Status: FUNCTIONAL
Brand: JOURNEY

## 2019-03-28 DEVICE — IMPLANTABLE DEVICE: Type: IMPLANTABLE DEVICE | Site: KNEE | Status: FUNCTIONAL

## 2019-03-28 DEVICE — JOURNEY II CR FEMORAL OXINIUM NONPOROUS LEFT SIZE 4
Type: IMPLANTABLE DEVICE | Site: KNEE | Status: FUNCTIONAL
Brand: JOURNEY

## 2019-03-28 DEVICE — JOURNEY II CR INSERT XLPE LEFT SIZE 3-4 9MM
Type: IMPLANTABLE DEVICE | Site: KNEE | Status: FUNCTIONAL
Brand: JOURNEY

## 2019-03-28 DEVICE — JOURNEY 7.5 ROUND RESURF PAT 32MM STANDARD
Type: IMPLANTABLE DEVICE | Site: KNEE | Status: FUNCTIONAL
Brand: JOURNEY

## 2019-03-28 DEVICE — PALACOS® R IS A FAST-CURING, RADIOPAQUE, POLY(METHYL METHACRYLATE)-BASED BONE CEMENT.PALACOS ® R CONTAINS THE X-RAY CONTRAST MEDIUM ZIRCONIUM DIOXIDE. TO IMPROVE VISIBILITY IN THE SURGICAL FIELD PALACOS ® R HAS BEEN COLOURED WITH CHLOROPHYLL (E141). THE BONE CEMENT IS PREPARED DIRECTLY BEFORE USE BY MIXING A POLYMER POWDER COMPONENT WITH A LIQUID MONOMER COMPONENT. A DUCTILE DOUGH FORMS WHICH CURES WITHIN A FEW MINUTES.
Type: IMPLANTABLE DEVICE | Site: KNEE | Status: FUNCTIONAL
Brand: PALACOS®

## 2019-03-28 RX ORDER — NEBIVOLOL 10 MG/1
40 TABLET ORAL DAILY
Status: DISCONTINUED | OUTPATIENT
Start: 2019-03-28 | End: 2019-03-28

## 2019-03-28 RX ORDER — EPHEDRINE SULFATE 50 MG/ML
5 INJECTION, SOLUTION INTRAVENOUS ONCE AS NEEDED
Status: DISCONTINUED | OUTPATIENT
Start: 2019-03-28 | End: 2019-03-28 | Stop reason: HOSPADM

## 2019-03-28 RX ORDER — KETOROLAC TROMETHAMINE 15 MG/ML
15 INJECTION, SOLUTION INTRAMUSCULAR; INTRAVENOUS EVERY 6 HOURS
Status: DISCONTINUED | OUTPATIENT
Start: 2019-03-28 | End: 2019-03-29 | Stop reason: HOSPADM

## 2019-03-28 RX ORDER — MIDAZOLAM HYDROCHLORIDE 1 MG/ML
1 INJECTION INTRAMUSCULAR; INTRAVENOUS
Status: DISCONTINUED | OUTPATIENT
Start: 2019-03-28 | End: 2019-03-28 | Stop reason: HOSPADM

## 2019-03-28 RX ORDER — KETOROLAC TROMETHAMINE 15 MG/ML
15 INJECTION, SOLUTION INTRAMUSCULAR; INTRAVENOUS EVERY 6 HOURS
Status: DISCONTINUED | OUTPATIENT
Start: 2019-03-28 | End: 2019-03-28

## 2019-03-28 RX ORDER — MAGNESIUM HYDROXIDE 1200 MG/15ML
LIQUID ORAL AS NEEDED
Status: DISCONTINUED | OUTPATIENT
Start: 2019-03-28 | End: 2019-03-28 | Stop reason: HOSPADM

## 2019-03-28 RX ORDER — OXYCODONE HYDROCHLORIDE AND ACETAMINOPHEN 5; 325 MG/1; MG/1
1 TABLET ORAL EVERY 4 HOURS PRN
Status: DISCONTINUED | OUTPATIENT
Start: 2019-03-28 | End: 2019-03-29 | Stop reason: HOSPADM

## 2019-03-28 RX ORDER — ATENOLOL AND CHLORTHALIDONE 100; 25 MG/1; MG/1
TABLET ORAL
Status: DISCONTINUED | OUTPATIENT
Start: 2019-03-28 | End: 2019-03-28

## 2019-03-28 RX ORDER — FAMOTIDINE 20 MG/1
20 TABLET, FILM COATED ORAL 2 TIMES DAILY
Status: DISCONTINUED | OUTPATIENT
Start: 2019-03-28 | End: 2019-03-29 | Stop reason: HOSPADM

## 2019-03-28 RX ORDER — ACETAMINOPHEN 650 MG/1
650 SUPPOSITORY RECTAL EVERY 4 HOURS PRN
Status: DISCONTINUED | OUTPATIENT
Start: 2019-03-28 | End: 2019-03-29 | Stop reason: HOSPADM

## 2019-03-28 RX ORDER — MIDAZOLAM HYDROCHLORIDE 1 MG/ML
2 INJECTION INTRAMUSCULAR; INTRAVENOUS
Status: DISCONTINUED | OUTPATIENT
Start: 2019-03-28 | End: 2019-03-28 | Stop reason: HOSPADM

## 2019-03-28 RX ORDER — LABETALOL HYDROCHLORIDE 5 MG/ML
5 INJECTION, SOLUTION INTRAVENOUS
Status: DISCONTINUED | OUTPATIENT
Start: 2019-03-28 | End: 2019-03-28 | Stop reason: HOSPADM

## 2019-03-28 RX ORDER — PROPARACAINE HYDROCHLORIDE 5 MG/ML
1 SOLUTION/ DROPS OPHTHALMIC ONCE
Status: COMPLETED | OUTPATIENT
Start: 2019-03-28 | End: 2019-03-28

## 2019-03-28 RX ORDER — PROMETHAZINE HYDROCHLORIDE 25 MG/ML
12.5 INJECTION, SOLUTION INTRAMUSCULAR; INTRAVENOUS ONCE AS NEEDED
Status: DISCONTINUED | OUTPATIENT
Start: 2019-03-28 | End: 2019-03-28 | Stop reason: HOSPADM

## 2019-03-28 RX ORDER — SODIUM CHLORIDE 0.9 % (FLUSH) 0.9 %
3-10 SYRINGE (ML) INJECTION AS NEEDED
Status: DISCONTINUED | OUTPATIENT
Start: 2019-03-28 | End: 2019-03-28 | Stop reason: HOSPADM

## 2019-03-28 RX ORDER — NEBIVOLOL 10 MG/1
20 TABLET ORAL DAILY
Status: DISCONTINUED | OUTPATIENT
Start: 2019-03-28 | End: 2019-03-29 | Stop reason: HOSPADM

## 2019-03-28 RX ORDER — NALOXONE HCL 0.4 MG/ML
0.2 VIAL (ML) INJECTION AS NEEDED
Status: DISCONTINUED | OUTPATIENT
Start: 2019-03-28 | End: 2019-03-28 | Stop reason: HOSPADM

## 2019-03-28 RX ORDER — NALOXONE HCL 0.4 MG/ML
0.1 VIAL (ML) INJECTION
Status: DISCONTINUED | OUTPATIENT
Start: 2019-03-28 | End: 2019-03-29 | Stop reason: HOSPADM

## 2019-03-28 RX ORDER — ACETAMINOPHEN 325 MG/1
650 TABLET ORAL ONCE AS NEEDED
Status: DISCONTINUED | OUTPATIENT
Start: 2019-03-28 | End: 2019-03-28 | Stop reason: HOSPADM

## 2019-03-28 RX ORDER — HYDROMORPHONE HYDROCHLORIDE 1 MG/ML
0.5 INJECTION, SOLUTION INTRAMUSCULAR; INTRAVENOUS; SUBCUTANEOUS
Status: DISCONTINUED | OUTPATIENT
Start: 2019-03-28 | End: 2019-03-28 | Stop reason: HOSPADM

## 2019-03-28 RX ORDER — BISACODYL 5 MG/1
10 TABLET, DELAYED RELEASE ORAL DAILY PRN
Status: DISCONTINUED | OUTPATIENT
Start: 2019-03-28 | End: 2019-03-29 | Stop reason: HOSPADM

## 2019-03-28 RX ORDER — FAMOTIDINE 10 MG/ML
20 INJECTION, SOLUTION INTRAVENOUS ONCE
Status: DISCONTINUED | OUTPATIENT
Start: 2019-03-28 | End: 2019-03-28

## 2019-03-28 RX ORDER — METOCLOPRAMIDE HYDROCHLORIDE 5 MG/ML
INJECTION INTRAMUSCULAR; INTRAVENOUS
Status: DISCONTINUED
Start: 2019-03-28 | End: 2019-03-28 | Stop reason: WASHOUT

## 2019-03-28 RX ORDER — SODIUM CHLORIDE 0.9 % (FLUSH) 0.9 %
3 SYRINGE (ML) INJECTION EVERY 12 HOURS SCHEDULED
Status: DISCONTINUED | OUTPATIENT
Start: 2019-03-28 | End: 2019-03-28 | Stop reason: HOSPADM

## 2019-03-28 RX ORDER — BACITRACIN 500 [USP'U]/G
OINTMENT OPHTHALMIC 3 TIMES DAILY
Status: DISCONTINUED | OUTPATIENT
Start: 2019-03-28 | End: 2019-03-29 | Stop reason: HOSPADM

## 2019-03-28 RX ORDER — ROCURONIUM BROMIDE 10 MG/ML
INJECTION, SOLUTION INTRAVENOUS AS NEEDED
Status: DISCONTINUED | OUTPATIENT
Start: 2019-03-28 | End: 2019-03-28 | Stop reason: SURG

## 2019-03-28 RX ORDER — SUCCINYLCHOLINE CHLORIDE 20 MG/ML
INJECTION INTRAMUSCULAR; INTRAVENOUS AS NEEDED
Status: DISCONTINUED | OUTPATIENT
Start: 2019-03-28 | End: 2019-03-28 | Stop reason: SURG

## 2019-03-28 RX ORDER — ASPIRIN 325 MG
325 TABLET, DELAYED RELEASE (ENTERIC COATED) ORAL DAILY
Status: DISCONTINUED | OUTPATIENT
Start: 2019-03-28 | End: 2019-03-29 | Stop reason: HOSPADM

## 2019-03-28 RX ORDER — PROPOFOL 10 MG/ML
VIAL (ML) INTRAVENOUS AS NEEDED
Status: DISCONTINUED | OUTPATIENT
Start: 2019-03-28 | End: 2019-03-28 | Stop reason: SURG

## 2019-03-28 RX ORDER — ACETAMINOPHEN 500 MG
1000 TABLET ORAL ONCE
Status: COMPLETED | OUTPATIENT
Start: 2019-03-28 | End: 2019-03-28

## 2019-03-28 RX ORDER — ONDANSETRON 2 MG/ML
4 INJECTION INTRAMUSCULAR; INTRAVENOUS ONCE AS NEEDED
Status: DISCONTINUED | OUTPATIENT
Start: 2019-03-28 | End: 2019-03-28 | Stop reason: HOSPADM

## 2019-03-28 RX ORDER — FENTANYL CITRATE 50 UG/ML
INJECTION, SOLUTION INTRAMUSCULAR; INTRAVENOUS AS NEEDED
Status: DISCONTINUED | OUTPATIENT
Start: 2019-03-28 | End: 2019-03-28 | Stop reason: SURG

## 2019-03-28 RX ORDER — CEFAZOLIN SODIUM 2 G/100ML
2 INJECTION, SOLUTION INTRAVENOUS EVERY 8 HOURS
Status: COMPLETED | OUTPATIENT
Start: 2019-03-28 | End: 2019-03-29

## 2019-03-28 RX ORDER — MIDAZOLAM HYDROCHLORIDE 1 MG/ML
1 INJECTION INTRAMUSCULAR; INTRAVENOUS
Status: DISCONTINUED | OUTPATIENT
Start: 2019-03-28 | End: 2019-03-28 | Stop reason: SDUPTHER

## 2019-03-28 RX ORDER — FENTANYL CITRATE 50 UG/ML
50 INJECTION, SOLUTION INTRAMUSCULAR; INTRAVENOUS
Status: DISCONTINUED | OUTPATIENT
Start: 2019-03-28 | End: 2019-03-28 | Stop reason: HOSPADM

## 2019-03-28 RX ORDER — SODIUM CHLORIDE 0.9 % (FLUSH) 0.9 %
3 SYRINGE (ML) INJECTION EVERY 12 HOURS SCHEDULED
Status: DISCONTINUED | OUTPATIENT
Start: 2019-03-28 | End: 2019-03-28 | Stop reason: SDUPTHER

## 2019-03-28 RX ORDER — PROMETHAZINE HYDROCHLORIDE 25 MG/1
25 TABLET ORAL ONCE AS NEEDED
Status: DISCONTINUED | OUTPATIENT
Start: 2019-03-28 | End: 2019-03-28 | Stop reason: HOSPADM

## 2019-03-28 RX ORDER — SODIUM CHLORIDE, SODIUM LACTATE, POTASSIUM CHLORIDE, CALCIUM CHLORIDE 600; 310; 30; 20 MG/100ML; MG/100ML; MG/100ML; MG/100ML
9 INJECTION, SOLUTION INTRAVENOUS CONTINUOUS
Status: DISCONTINUED | OUTPATIENT
Start: 2019-03-28 | End: 2019-03-29 | Stop reason: HOSPADM

## 2019-03-28 RX ORDER — GLYCOPYRROLATE 0.2 MG/ML
INJECTION INTRAMUSCULAR; INTRAVENOUS AS NEEDED
Status: DISCONTINUED | OUTPATIENT
Start: 2019-03-28 | End: 2019-03-28 | Stop reason: SURG

## 2019-03-28 RX ORDER — CYCLOBENZAPRINE HCL 10 MG
10 TABLET ORAL 3 TIMES DAILY PRN
Status: DISCONTINUED | OUTPATIENT
Start: 2019-03-28 | End: 2019-03-29 | Stop reason: HOSPADM

## 2019-03-28 RX ORDER — DIPHENHYDRAMINE HCL 25 MG
25 CAPSULE ORAL
Status: DISCONTINUED | OUTPATIENT
Start: 2019-03-28 | End: 2019-03-28 | Stop reason: HOSPADM

## 2019-03-28 RX ORDER — LIDOCAINE HYDROCHLORIDE 20 MG/ML
INJECTION, SOLUTION INFILTRATION; PERINEURAL AS NEEDED
Status: DISCONTINUED | OUTPATIENT
Start: 2019-03-28 | End: 2019-03-28 | Stop reason: SURG

## 2019-03-28 RX ORDER — OXYCODONE HYDROCHLORIDE AND ACETAMINOPHEN 5; 325 MG/1; MG/1
1-2 TABLET ORAL EVERY 4 HOURS PRN
Qty: 60 TABLET | Refills: 0 | Status: SHIPPED | OUTPATIENT
Start: 2019-03-28 | End: 2019-04-07

## 2019-03-28 RX ORDER — ALPRAZOLAM 0.25 MG/1
0.25 TABLET ORAL AS NEEDED
Status: DISCONTINUED | OUTPATIENT
Start: 2019-03-28 | End: 2019-03-28

## 2019-03-28 RX ORDER — HYDROMORPHONE HYDROCHLORIDE 1 MG/ML
0.5 INJECTION, SOLUTION INTRAMUSCULAR; INTRAVENOUS; SUBCUTANEOUS EVERY 4 HOURS PRN
Status: DISCONTINUED | OUTPATIENT
Start: 2019-03-28 | End: 2019-03-29 | Stop reason: HOSPADM

## 2019-03-28 RX ORDER — ACETAMINOPHEN 325 MG/1
650 TABLET ORAL EVERY 4 HOURS PRN
Status: DISCONTINUED | OUTPATIENT
Start: 2019-03-28 | End: 2019-03-29 | Stop reason: HOSPADM

## 2019-03-28 RX ORDER — HYDROMORPHONE HCL 110MG/55ML
PATIENT CONTROLLED ANALGESIA SYRINGE INTRAVENOUS AS NEEDED
Status: DISCONTINUED | OUTPATIENT
Start: 2019-03-28 | End: 2019-03-28 | Stop reason: SURG

## 2019-03-28 RX ORDER — OXYCODONE AND ACETAMINOPHEN 7.5; 325 MG/1; MG/1
1 TABLET ORAL ONCE AS NEEDED
Status: DISCONTINUED | OUTPATIENT
Start: 2019-03-28 | End: 2019-03-28 | Stop reason: HOSPADM

## 2019-03-28 RX ORDER — ACETAMINOPHEN 325 MG/1
325 TABLET ORAL EVERY 4 HOURS PRN
Status: DISCONTINUED | OUTPATIENT
Start: 2019-03-28 | End: 2019-03-29 | Stop reason: HOSPADM

## 2019-03-28 RX ORDER — FLUMAZENIL 0.1 MG/ML
0.2 INJECTION INTRAVENOUS AS NEEDED
Status: DISCONTINUED | OUTPATIENT
Start: 2019-03-28 | End: 2019-03-28 | Stop reason: HOSPADM

## 2019-03-28 RX ORDER — OXYCODONE HYDROCHLORIDE AND ACETAMINOPHEN 5; 325 MG/1; MG/1
2 TABLET ORAL EVERY 4 HOURS PRN
Status: DISCONTINUED | OUTPATIENT
Start: 2019-03-28 | End: 2019-03-29 | Stop reason: HOSPADM

## 2019-03-28 RX ORDER — ONDANSETRON 4 MG/1
4 TABLET, FILM COATED ORAL EVERY 6 HOURS PRN
Status: DISCONTINUED | OUTPATIENT
Start: 2019-03-28 | End: 2019-03-29 | Stop reason: HOSPADM

## 2019-03-28 RX ORDER — ONDANSETRON 2 MG/ML
INJECTION INTRAMUSCULAR; INTRAVENOUS AS NEEDED
Status: DISCONTINUED | OUTPATIENT
Start: 2019-03-28 | End: 2019-03-28 | Stop reason: SURG

## 2019-03-28 RX ORDER — CEFAZOLIN SODIUM 2 G/100ML
INJECTION, SOLUTION INTRAVENOUS AS NEEDED
Status: DISCONTINUED | OUTPATIENT
Start: 2019-03-28 | End: 2019-03-28 | Stop reason: SURG

## 2019-03-28 RX ORDER — SODIUM CHLORIDE, SODIUM LACTATE, POTASSIUM CHLORIDE, CALCIUM CHLORIDE 600; 310; 30; 20 MG/100ML; MG/100ML; MG/100ML; MG/100ML
9 INJECTION, SOLUTION INTRAVENOUS CONTINUOUS
Status: DISCONTINUED | OUTPATIENT
Start: 2019-03-28 | End: 2019-03-28 | Stop reason: SDUPTHER

## 2019-03-28 RX ORDER — BISACODYL 10 MG
10 SUPPOSITORY, RECTAL RECTAL DAILY PRN
Status: DISCONTINUED | OUTPATIENT
Start: 2019-03-28 | End: 2019-03-29 | Stop reason: HOSPADM

## 2019-03-28 RX ORDER — NEBIVOLOL 20 MG/1
40 TABLET ORAL DAILY
COMMUNITY
End: 2020-05-28

## 2019-03-28 RX ORDER — DEXAMETHASONE SODIUM PHOSPHATE 10 MG/ML
INJECTION INTRAMUSCULAR; INTRAVENOUS AS NEEDED
Status: DISCONTINUED | OUTPATIENT
Start: 2019-03-28 | End: 2019-03-28 | Stop reason: SURG

## 2019-03-28 RX ORDER — ACETAMINOPHEN 160 MG/5ML
650 SOLUTION ORAL EVERY 4 HOURS PRN
Status: DISCONTINUED | OUTPATIENT
Start: 2019-03-28 | End: 2019-03-29 | Stop reason: HOSPADM

## 2019-03-28 RX ORDER — FONDAPARINUX SODIUM 2.5 MG/.5ML
2.5 INJECTION SUBCUTANEOUS ONCE
Status: COMPLETED | OUTPATIENT
Start: 2019-03-29 | End: 2019-03-29

## 2019-03-28 RX ORDER — SODIUM CHLORIDE 0.9 % (FLUSH) 0.9 %
3-10 SYRINGE (ML) INJECTION AS NEEDED
Status: DISCONTINUED | OUTPATIENT
Start: 2019-03-28 | End: 2019-03-28 | Stop reason: SDUPTHER

## 2019-03-28 RX ORDER — FERROUS SULFATE 325(65) MG
325 TABLET ORAL
Status: DISCONTINUED | OUTPATIENT
Start: 2019-03-28 | End: 2019-03-29 | Stop reason: HOSPADM

## 2019-03-28 RX ORDER — SODIUM CHLORIDE 0.9 % (FLUSH) 0.9 %
1-10 SYRINGE (ML) INJECTION AS NEEDED
Status: DISCONTINUED | OUTPATIENT
Start: 2019-03-28 | End: 2019-03-29 | Stop reason: HOSPADM

## 2019-03-28 RX ORDER — SODIUM CHLORIDE, SODIUM LACTATE, POTASSIUM CHLORIDE, CALCIUM CHLORIDE 600; 310; 30; 20 MG/100ML; MG/100ML; MG/100ML; MG/100ML
100 INJECTION, SOLUTION INTRAVENOUS CONTINUOUS
Status: DISCONTINUED | OUTPATIENT
Start: 2019-03-28 | End: 2019-03-29 | Stop reason: HOSPADM

## 2019-03-28 RX ORDER — ONDANSETRON 4 MG/1
4 TABLET, ORALLY DISINTEGRATING ORAL EVERY 6 HOURS PRN
Status: DISCONTINUED | OUTPATIENT
Start: 2019-03-28 | End: 2019-03-29 | Stop reason: HOSPADM

## 2019-03-28 RX ORDER — BUPROPION HYDROCHLORIDE 300 MG/1
300 TABLET ORAL EVERY MORNING
Status: DISCONTINUED | OUTPATIENT
Start: 2019-03-29 | End: 2019-03-29 | Stop reason: HOSPADM

## 2019-03-28 RX ORDER — TRANEXAMIC ACID 100 MG/ML
INJECTION, SOLUTION INTRAVENOUS AS NEEDED
Status: DISCONTINUED | OUTPATIENT
Start: 2019-03-28 | End: 2019-03-28 | Stop reason: SURG

## 2019-03-28 RX ORDER — FAMOTIDINE 10 MG/ML
20 INJECTION, SOLUTION INTRAVENOUS ONCE
Status: COMPLETED | OUTPATIENT
Start: 2019-03-28 | End: 2019-03-28

## 2019-03-28 RX ORDER — HYDROCODONE BITARTRATE AND ACETAMINOPHEN 7.5; 325 MG/1; MG/1
1 TABLET ORAL ONCE AS NEEDED
Status: DISCONTINUED | OUTPATIENT
Start: 2019-03-28 | End: 2019-03-28 | Stop reason: HOSPADM

## 2019-03-28 RX ORDER — MIDAZOLAM HYDROCHLORIDE 1 MG/ML
2 INJECTION INTRAMUSCULAR; INTRAVENOUS
Status: DISCONTINUED | OUTPATIENT
Start: 2019-03-28 | End: 2019-03-28 | Stop reason: SDUPTHER

## 2019-03-28 RX ORDER — DIPHENHYDRAMINE HYDROCHLORIDE 50 MG/ML
12.5 INJECTION INTRAMUSCULAR; INTRAVENOUS
Status: DISCONTINUED | OUTPATIENT
Start: 2019-03-28 | End: 2019-03-28 | Stop reason: HOSPADM

## 2019-03-28 RX ORDER — LEVOTHYROXINE SODIUM 88 UG/1
88 TABLET ORAL DAILY
Status: DISCONTINUED | OUTPATIENT
Start: 2019-03-28 | End: 2019-03-29 | Stop reason: HOSPADM

## 2019-03-28 RX ORDER — SODIUM CHLORIDE 0.9 % (FLUSH) 0.9 %
3 SYRINGE (ML) INJECTION EVERY 12 HOURS SCHEDULED
Status: DISCONTINUED | OUTPATIENT
Start: 2019-03-28 | End: 2019-03-29 | Stop reason: HOSPADM

## 2019-03-28 RX ORDER — HYDRALAZINE HYDROCHLORIDE 20 MG/ML
5 INJECTION INTRAMUSCULAR; INTRAVENOUS
Status: DISCONTINUED | OUTPATIENT
Start: 2019-03-28 | End: 2019-03-28 | Stop reason: HOSPADM

## 2019-03-28 RX ORDER — ONDANSETRON 2 MG/ML
4 INJECTION INTRAMUSCULAR; INTRAVENOUS EVERY 6 HOURS PRN
Status: DISCONTINUED | OUTPATIENT
Start: 2019-03-28 | End: 2019-03-29 | Stop reason: HOSPADM

## 2019-03-28 RX ORDER — PROMETHAZINE HYDROCHLORIDE 25 MG/1
25 SUPPOSITORY RECTAL ONCE AS NEEDED
Status: DISCONTINUED | OUTPATIENT
Start: 2019-03-28 | End: 2019-03-28 | Stop reason: HOSPADM

## 2019-03-28 RX ADMIN — HYDROMORPHONE HYDROCHLORIDE 0.5 MG: 1 INJECTION, SOLUTION INTRAMUSCULAR; INTRAVENOUS; SUBCUTANEOUS at 11:31

## 2019-03-28 RX ADMIN — NEOSTIGMINE METHYLSULFATE 3 MG: 1 INJECTION INTRAMUSCULAR; INTRAVENOUS; SUBCUTANEOUS at 10:38

## 2019-03-28 RX ADMIN — VANCOMYCIN HYDROCHLORIDE 1500 MG: 10 INJECTION, POWDER, LYOPHILIZED, FOR SOLUTION INTRAVENOUS at 08:03

## 2019-03-28 RX ADMIN — OXYCODONE HYDROCHLORIDE AND ACETAMINOPHEN 1 TABLET: 5; 325 TABLET ORAL at 18:08

## 2019-03-28 RX ADMIN — FENTANYL CITRATE 100 MCG: 50 INJECTION INTRAMUSCULAR; INTRAVENOUS at 09:23

## 2019-03-28 RX ADMIN — PROPOFOL 200 MG: 10 INJECTION, EMULSION INTRAVENOUS at 09:27

## 2019-03-28 RX ADMIN — ONDANSETRON 4 MG: 2 INJECTION INTRAMUSCULAR; INTRAVENOUS at 10:40

## 2019-03-28 RX ADMIN — KETOROLAC TROMETHAMINE 15 MG: 15 INJECTION, SOLUTION INTRAMUSCULAR; INTRAVENOUS at 15:12

## 2019-03-28 RX ADMIN — ROCURONIUM BROMIDE 40 MG: 10 INJECTION INTRAVENOUS at 09:39

## 2019-03-28 RX ADMIN — CYCLOBENZAPRINE 10 MG: 10 TABLET, FILM COATED ORAL at 20:58

## 2019-03-28 RX ADMIN — GLYCOPYRROLATE 0.6 MG: 0.2 INJECTION INTRAMUSCULAR; INTRAVENOUS at 10:38

## 2019-03-28 RX ADMIN — ASPIRIN 325 MG: 325 TABLET, DELAYED RELEASE ORAL at 15:10

## 2019-03-28 RX ADMIN — FERROUS SULFATE TAB 325 MG (65 MG ELEMENTAL FE) 325 MG: 325 (65 FE) TAB at 15:12

## 2019-03-28 RX ADMIN — SODIUM CHLORIDE, POTASSIUM CHLORIDE, SODIUM LACTATE AND CALCIUM CHLORIDE: 600; 310; 30; 20 INJECTION, SOLUTION INTRAVENOUS at 10:40

## 2019-03-28 RX ADMIN — ACETAMINOPHEN 1000 MG: 500 TABLET, FILM COATED ORAL at 08:03

## 2019-03-28 RX ADMIN — DEXAMETHASONE SODIUM PHOSPHATE 8 MG: 10 INJECTION INTRAMUSCULAR; INTRAVENOUS at 09:37

## 2019-03-28 RX ADMIN — GLYCOPYRROLATE 0.2 MG: 0.2 INJECTION INTRAMUSCULAR; INTRAVENOUS at 09:49

## 2019-03-28 RX ADMIN — CEFAZOLIN SODIUM 2 G: 2 INJECTION, SOLUTION INTRAVENOUS at 17:01

## 2019-03-28 RX ADMIN — LEVOTHYROXINE SODIUM 88 MCG: 88 TABLET ORAL at 15:12

## 2019-03-28 RX ADMIN — TRANEXAMIC ACID 1000 MG: 100 INJECTION, SOLUTION INTRAVENOUS at 10:29

## 2019-03-28 RX ADMIN — PROPARACAINE HYDROCHLORIDE 1 DROP: 5 SOLUTION/ DROPS OPHTHALMIC at 20:58

## 2019-03-28 RX ADMIN — HYDROMORPHONE HYDROCHLORIDE 1 MG: 2 INJECTION INTRAMUSCULAR; INTRAVENOUS; SUBCUTANEOUS at 09:43

## 2019-03-28 RX ADMIN — SUCCINYLCHOLINE CHLORIDE 100 MG: 20 INJECTION, SOLUTION INTRAMUSCULAR; INTRAVENOUS; PARENTERAL at 09:28

## 2019-03-28 RX ADMIN — LIDOCAINE HYDROCHLORIDE 100 MG: 20 INJECTION, SOLUTION INFILTRATION; PERINEURAL at 09:27

## 2019-03-28 RX ADMIN — FENTANYL CITRATE 50 MCG: 50 INJECTION INTRAMUSCULAR; INTRAVENOUS at 11:21

## 2019-03-28 RX ADMIN — KETOROLAC TROMETHAMINE 15 MG: 15 INJECTION, SOLUTION INTRAMUSCULAR; INTRAVENOUS at 21:48

## 2019-03-28 RX ADMIN — FAMOTIDINE 20 MG: 20 TABLET, FILM COATED ORAL at 20:59

## 2019-03-28 RX ADMIN — BACITRACIN: 500 OINTMENT OPHTHALMIC at 20:58

## 2019-03-28 RX ADMIN — CEFAZOLIN SODIUM 2 G: 2 INJECTION, SOLUTION INTRAVENOUS at 09:41

## 2019-03-28 RX ADMIN — FENTANYL CITRATE 50 MCG: 50 INJECTION INTRAMUSCULAR; INTRAVENOUS at 10:45

## 2019-03-28 RX ADMIN — MIDAZOLAM 2 MG: 1 INJECTION INTRAMUSCULAR; INTRAVENOUS at 08:51

## 2019-03-28 RX ADMIN — SODIUM CHLORIDE, PRESERVATIVE FREE 3 ML: 5 INJECTION INTRAVENOUS at 21:01

## 2019-03-28 RX ADMIN — ROCURONIUM BROMIDE 5 MG: 10 INJECTION INTRAVENOUS at 09:28

## 2019-03-28 RX ADMIN — FAMOTIDINE 20 MG: 10 INJECTION INTRAVENOUS at 08:50

## 2019-03-28 RX ADMIN — PHENYLEPHRINE HYDROCHLORIDE 100 MCG: 10 INJECTION INTRAVENOUS at 09:53

## 2019-03-28 RX ADMIN — METOCLOPRAMIDE 10 MG: 5 INJECTION, SOLUTION INTRAMUSCULAR; INTRAVENOUS at 09:12

## 2019-03-28 RX ADMIN — SODIUM CHLORIDE, POTASSIUM CHLORIDE, SODIUM LACTATE AND CALCIUM CHLORIDE 9 ML/HR: 600; 310; 30; 20 INJECTION, SOLUTION INTRAVENOUS at 08:51

## 2019-03-28 NOTE — ANESTHESIA PROCEDURE NOTES
Airway  Urgency: elective    Airway not difficult    General Information and Staff    Patient location during procedure: OR  Anesthesiologist: Jerry Penaloza MD  CRNA: Rod Ramsey CRNA    Indications and Patient Condition  Indications for airway management: airway protection    Preoxygenated: yes  MILS maintained throughout  Mask difficulty assessment: 1 - vent by mask    Final Airway Details  Final airway type: endotracheal airway      Successful airway: ETT  Cuffed: yes   Successful intubation technique: direct laryngoscopy  Facilitating devices/methods: cricoid pressure and Bougie  Endotracheal tube insertion site: oral  Blade: Barbie  Blade size: 3  ETT size (mm): 7.0  Cormack-Lehane Classification: grade IIa - partial view of glottis  Placement verified by: chest auscultation and capnometry   Number of attempts at approach: 2    Additional Comments  Teeth checked, no damage. Atraumatic.

## 2019-03-28 NOTE — ANESTHESIA PREPROCEDURE EVALUATION
Anesthesia Evaluation     Patient summary reviewed and Nursing notes reviewed   no history of anesthetic complications:  NPO Solid Status: > 6 hours  NPO Liquid Status: > 6 hours           Airway   Mallampati: II  TM distance: >3 FB  Neck ROM: full  no difficulty expected and No difficulty expected  Dental - normal exam     Pulmonary - normal exam    breath sounds clear to auscultation  (+) sleep apnea on CPAP,   (-) rhonchi, decreased breath sounds, wheezes, rales, stridor  Cardiovascular - normal exam    NYHA Classification: I  ECG reviewed  Patient on routine beta blocker and Beta blocker given within 24 hours of surgery  Rhythm: regular  Rate: normal    (+) hypertension, hyperlipidemia,   (-) murmur, weak pulses, friction rub, systolic click, carotid bruits, JVD, peripheral edema      Neuro/Psych- negative ROS  GI/Hepatic/Renal/Endo    (+)  GERD poorly controlled,      Musculoskeletal     Abdominal  - normal exam    Abdomen: soft.   Substance History - negative use     OB/GYN negative ob/gyn ROS         Other   (+) arthritis                     Anesthesia Plan    ASA 2     general     intravenous induction   Anesthetic plan, all risks, benefits, and alternatives have been provided, discussed and informed consent has been obtained with: patient.

## 2019-03-28 NOTE — ANESTHESIA POSTPROCEDURE EVALUATION
Patient: Kelle Soliz    Procedure Summary     Date:  03/28/19 Room / Location:  [facility] OR [address] [place] [address] MAIN OR    Anesthesia Start:  0920 Anesthesia Stop:  1057    Procedure:  LEFT TOTAL KNEE ARTHROPLASTY (Left Knee) Diagnosis:      Surgeon:  Neymar Hendricks MD Provider:  Jerry Penaloza MD    Anesthesia Type:  general ASA Status:  2          Anesthesia Type: general  Last vitals  BP   138/67 (03/28/19 1145)   Temp   36.5 °C (97.7 °F) (03/28/19 0743)   Pulse   69 (03/28/19 1145)   Resp   16 (03/28/19 1145)     SpO2   94 % (03/28/19 1145)     Post Anesthesia Care and Evaluation    Patient location during evaluation: PACU  Patient participation: complete - patient participated  Level of consciousness: awake and alert  Pain management: adequate  Airway patency: patent  Anesthetic complications: No anesthetic complications    Cardiovascular status: acceptable  Respiratory status: acceptable  Hydration status: acceptable    Comments: --------------------            03/28/19               1145     --------------------   BP:       138/67     Pulse:      69       Resp:       16       Temp:                SpO2:      94%      --------------------

## 2019-03-29 VITALS
BODY MASS INDEX: 41.88 KG/M2 | DIASTOLIC BLOOD PRESSURE: 70 MMHG | WEIGHT: 227.56 LBS | TEMPERATURE: 97.6 F | HEIGHT: 62 IN | OXYGEN SATURATION: 92 % | HEART RATE: 68 BPM | SYSTOLIC BLOOD PRESSURE: 125 MMHG | RESPIRATION RATE: 16 BRPM

## 2019-03-29 LAB
ANION GAP SERPL CALCULATED.3IONS-SCNC: 13.6 MMOL/L
BUN BLD-MCNC: 13 MG/DL (ref 8–23)
BUN/CREAT SERPL: 15.9 (ref 7–25)
CALCIUM SPEC-SCNC: 8.2 MG/DL (ref 8.6–10.5)
CHLORIDE SERPL-SCNC: 89 MMOL/L (ref 98–107)
CO2 SERPL-SCNC: 25.4 MMOL/L (ref 22–29)
CREAT BLD-MCNC: 0.82 MG/DL (ref 0.57–1)
GFR SERPL CREATININE-BSD FRML MDRD: 70 ML/MIN/1.73
GLUCOSE BLD-MCNC: 131 MG/DL (ref 65–99)
GLUCOSE BLDC GLUCOMTR-MCNC: 122 MG/DL (ref 70–130)
HCT VFR BLD AUTO: 33.5 % (ref 34–46.6)
HGB BLD-MCNC: 11.9 G/DL (ref 12–15.9)
POTASSIUM BLD-SCNC: 3.3 MMOL/L (ref 3.5–5.2)
SODIUM BLD-SCNC: 128 MMOL/L (ref 136–145)

## 2019-03-29 PROCEDURE — 85014 HEMATOCRIT: CPT | Performed by: ORTHOPAEDIC SURGERY

## 2019-03-29 PROCEDURE — 97110 THERAPEUTIC EXERCISES: CPT

## 2019-03-29 PROCEDURE — 80048 BASIC METABOLIC PNL TOTAL CA: CPT | Performed by: ORTHOPAEDIC SURGERY

## 2019-03-29 PROCEDURE — 85018 HEMOGLOBIN: CPT | Performed by: ORTHOPAEDIC SURGERY

## 2019-03-29 PROCEDURE — 97150 GROUP THERAPEUTIC PROCEDURES: CPT

## 2019-03-29 PROCEDURE — 25010000002 KETOROLAC TROMETHAMINE PER 15 MG: Performed by: ORTHOPAEDIC SURGERY

## 2019-03-29 PROCEDURE — 25010000002 FONDAPARINUX PER 0.5 MG: Performed by: ORTHOPAEDIC SURGERY

## 2019-03-29 PROCEDURE — 82962 GLUCOSE BLOOD TEST: CPT

## 2019-03-29 PROCEDURE — 25010000003 CEFAZOLIN IN DEXTROSE 2-4 GM/100ML-% SOLUTION: Performed by: ORTHOPAEDIC SURGERY

## 2019-03-29 RX ADMIN — LEVOTHYROXINE SODIUM 88 MCG: 88 TABLET ORAL at 08:32

## 2019-03-29 RX ADMIN — SODIUM CHLORIDE, PRESERVATIVE FREE 3 ML: 5 INJECTION INTRAVENOUS at 08:36

## 2019-03-29 RX ADMIN — OXYCODONE HYDROCHLORIDE AND ACETAMINOPHEN 1 TABLET: 5; 325 TABLET ORAL at 01:30

## 2019-03-29 RX ADMIN — BUPROPION HYDROCHLORIDE 300 MG: 300 TABLET, EXTENDED RELEASE ORAL at 08:31

## 2019-03-29 RX ADMIN — ASPIRIN 325 MG: 325 TABLET, DELAYED RELEASE ORAL at 08:31

## 2019-03-29 RX ADMIN — FONDAPARINUX SODIUM 2.5 MG: 2.5 INJECTION, SOLUTION SUBCUTANEOUS at 08:32

## 2019-03-29 RX ADMIN — CEFAZOLIN SODIUM 2 G: 2 INJECTION, SOLUTION INTRAVENOUS at 01:30

## 2019-03-29 RX ADMIN — OXYCODONE HYDROCHLORIDE AND ACETAMINOPHEN 1 TABLET: 5; 325 TABLET ORAL at 05:45

## 2019-03-29 RX ADMIN — FERROUS SULFATE TAB 325 MG (65 MG ELEMENTAL FE) 325 MG: 325 (65 FE) TAB at 08:31

## 2019-03-29 RX ADMIN — NEBIVOLOL HYDROCHLORIDE 20 MG: 10 TABLET ORAL at 08:31

## 2019-03-29 RX ADMIN — FAMOTIDINE 20 MG: 20 TABLET, FILM COATED ORAL at 08:31

## 2019-03-29 RX ADMIN — KETOROLAC TROMETHAMINE 15 MG: 15 INJECTION, SOLUTION INTRAMUSCULAR; INTRAVENOUS at 05:09

## 2019-03-29 RX ADMIN — OXYCODONE HYDROCHLORIDE AND ACETAMINOPHEN 1 TABLET: 5; 325 TABLET ORAL at 10:18

## 2020-03-05 ENCOUNTER — APPOINTMENT (OUTPATIENT)
Dept: WOMENS IMAGING | Facility: HOSPITAL | Age: 65
End: 2020-03-05

## 2020-03-05 PROCEDURE — 77063 BREAST TOMOSYNTHESIS BI: CPT | Performed by: RADIOLOGY

## 2020-03-05 PROCEDURE — 77067 SCR MAMMO BI INCL CAD: CPT | Performed by: RADIOLOGY

## 2020-04-14 ENCOUNTER — APPOINTMENT (OUTPATIENT)
Dept: CT IMAGING | Facility: HOSPITAL | Age: 65
End: 2020-04-14

## 2020-04-14 ENCOUNTER — HOSPITAL ENCOUNTER (EMERGENCY)
Facility: HOSPITAL | Age: 65
Discharge: HOME | End: 2020-04-14
Attending: EMERGENCY MEDICINE | Admitting: EMERGENCY MEDICINE

## 2020-04-14 VITALS
TEMPERATURE: 98.4 F | OXYGEN SATURATION: 96 % | HEIGHT: 62 IN | RESPIRATION RATE: 18 BRPM | SYSTOLIC BLOOD PRESSURE: 164 MMHG | HEART RATE: 69 BPM | DIASTOLIC BLOOD PRESSURE: 92 MMHG | BODY MASS INDEX: 39.56 KG/M2 | WEIGHT: 215 LBS

## 2020-04-14 DIAGNOSIS — R11.0 NAUSEA: ICD-10-CM

## 2020-04-14 DIAGNOSIS — E87.6 HYPOKALEMIA: Primary | ICD-10-CM

## 2020-04-14 LAB
ALBUMIN SERPL-MCNC: 4.9 G/DL (ref 3.5–5.2)
ALBUMIN/GLOB SERPL: 1.8 G/DL
ALP SERPL-CCNC: 95 U/L (ref 39–117)
ALT SERPL W P-5'-P-CCNC: 31 U/L (ref 1–33)
ANION GAP SERPL CALCULATED.3IONS-SCNC: 12.1 MMOL/L (ref 5–15)
AST SERPL-CCNC: 26 U/L (ref 1–32)
BACTERIA UR QL AUTO: ABNORMAL /HPF
BASOPHILS # BLD AUTO: 0.03 10*3/MM3 (ref 0–0.2)
BASOPHILS NFR BLD AUTO: 0.3 % (ref 0–1.5)
BILIRUB SERPL-MCNC: 0.7 MG/DL (ref 0.2–1.2)
BILIRUB UR QL STRIP: NEGATIVE
BUN BLD-MCNC: 10 MG/DL (ref 8–23)
BUN/CREAT SERPL: 9.3 (ref 7–25)
CALCIUM SPEC-SCNC: 9.8 MG/DL (ref 8.6–10.5)
CHLORIDE SERPL-SCNC: 84 MMOL/L (ref 98–107)
CLARITY UR: CLEAR
CO2 SERPL-SCNC: 28.9 MMOL/L (ref 22–29)
COLOR UR: YELLOW
CREAT BLD-MCNC: 1.07 MG/DL (ref 0.57–1)
DEPRECATED RDW RBC AUTO: 41.3 FL (ref 37–54)
EOSINOPHIL # BLD AUTO: 0.11 10*3/MM3 (ref 0–0.4)
EOSINOPHIL NFR BLD AUTO: 0.9 % (ref 0.3–6.2)
ERYTHROCYTE [DISTWIDTH] IN BLOOD BY AUTOMATED COUNT: 12.7 % (ref 12.3–15.4)
GFR SERPL CREATININE-BSD FRML MDRD: 51 ML/MIN/1.73
GLOBULIN UR ELPH-MCNC: 2.7 GM/DL
GLUCOSE BLD-MCNC: 119 MG/DL (ref 65–99)
GLUCOSE UR STRIP-MCNC: NEGATIVE MG/DL
HCT VFR BLD AUTO: 44.8 % (ref 34–46.6)
HGB BLD-MCNC: 15.7 G/DL (ref 12–15.9)
HGB UR QL STRIP.AUTO: ABNORMAL
HYALINE CASTS UR QL AUTO: ABNORMAL /LPF
IMM GRANULOCYTES # BLD AUTO: 0.06 10*3/MM3 (ref 0–0.05)
IMM GRANULOCYTES NFR BLD AUTO: 0.5 % (ref 0–0.5)
KETONES UR QL STRIP: NEGATIVE
LEUKOCYTE ESTERASE UR QL STRIP.AUTO: NEGATIVE
LIPASE SERPL-CCNC: 33 U/L (ref 13–60)
LYMPHOCYTES # BLD AUTO: 3.15 10*3/MM3 (ref 0.7–3.1)
LYMPHOCYTES NFR BLD AUTO: 27.1 % (ref 19.6–45.3)
MCH RBC QN AUTO: 31.4 PG (ref 26.6–33)
MCHC RBC AUTO-ENTMCNC: 35 G/DL (ref 31.5–35.7)
MCV RBC AUTO: 89.6 FL (ref 79–97)
MONOCYTES # BLD AUTO: 0.86 10*3/MM3 (ref 0.1–0.9)
MONOCYTES NFR BLD AUTO: 7.4 % (ref 5–12)
NEUTROPHILS # BLD AUTO: 7.41 10*3/MM3 (ref 1.7–7)
NEUTROPHILS NFR BLD AUTO: 63.8 % (ref 42.7–76)
NITRITE UR QL STRIP: NEGATIVE
NRBC BLD AUTO-RTO: 0 /100 WBC (ref 0–0.2)
PH UR STRIP.AUTO: 7.5 [PH] (ref 5–8)
PLATELET # BLD AUTO: 271 10*3/MM3 (ref 140–450)
PMV BLD AUTO: 9 FL (ref 6–12)
POTASSIUM BLD-SCNC: 3 MMOL/L (ref 3.5–5.2)
PROT SERPL-MCNC: 7.6 G/DL (ref 6–8.5)
PROT UR QL STRIP: NEGATIVE
RBC # BLD AUTO: 5 10*6/MM3 (ref 3.77–5.28)
RBC # UR: ABNORMAL /HPF
REF LAB TEST METHOD: ABNORMAL
SODIUM BLD-SCNC: 125 MMOL/L (ref 136–145)
SP GR UR STRIP: 1.01 (ref 1–1.03)
SQUAMOUS #/AREA URNS HPF: ABNORMAL /HPF
UROBILINOGEN UR QL STRIP: ABNORMAL
WBC NRBC COR # BLD: 11.62 10*3/MM3 (ref 3.4–10.8)
WBC UR QL AUTO: ABNORMAL /HPF

## 2020-04-14 PROCEDURE — 25010000002 IOPAMIDOL 61 % SOLUTION: Performed by: EMERGENCY MEDICINE

## 2020-04-14 PROCEDURE — 81001 URINALYSIS AUTO W/SCOPE: CPT | Performed by: PHYSICIAN ASSISTANT

## 2020-04-14 PROCEDURE — 85025 COMPLETE CBC W/AUTO DIFF WBC: CPT | Performed by: PHYSICIAN ASSISTANT

## 2020-04-14 PROCEDURE — 99283 EMERGENCY DEPT VISIT LOW MDM: CPT

## 2020-04-14 PROCEDURE — 80053 COMPREHEN METABOLIC PANEL: CPT | Performed by: PHYSICIAN ASSISTANT

## 2020-04-14 PROCEDURE — 83690 ASSAY OF LIPASE: CPT | Performed by: PHYSICIAN ASSISTANT

## 2020-04-14 PROCEDURE — 74177 CT ABD & PELVIS W/CONTRAST: CPT

## 2020-04-14 RX ORDER — POTASSIUM CHLORIDE 1500 MG/1
20 TABLET, FILM COATED, EXTENDED RELEASE ORAL 2 TIMES DAILY
COMMUNITY
End: 2020-05-03 | Stop reason: HOSPADM

## 2020-04-14 RX ORDER — ONDANSETRON 4 MG/1
4 TABLET, ORALLY DISINTEGRATING ORAL 4 TIMES DAILY PRN
Qty: 28 TABLET | Refills: 0 | Status: SHIPPED | OUTPATIENT
Start: 2020-04-14 | End: 2020-04-21

## 2020-04-14 RX ORDER — ONDANSETRON 4 MG/1
4-8 TABLET, FILM COATED ORAL EVERY 8 HOURS PRN
COMMUNITY
End: 2020-04-14

## 2020-04-14 RX ADMIN — SODIUM CHLORIDE 500 ML: 9 INJECTION, SOLUTION INTRAVENOUS at 03:50

## 2020-04-14 RX ADMIN — IOPAMIDOL 85 ML: 612 INJECTION, SOLUTION INTRAVENOUS at 04:06

## 2020-04-14 NOTE — ED PROVIDER NOTES
MD ATTESTATION NOTE    The MOUNIKA and I have discussed this patient's history, physical exam, and treatment plan.  I have reviewed the documentation and personally had a face to face interaction with the patient. I affirm the documentation and agree with the treatment and plan.  The attached note describes my personal findings.    Patient presents with nausea and some mild vague abdominal pain.  Her exam is benign, CT unremarkable, labs show some slight hyponatremia and hypokalemia.  Based on these findings she can be treated in the outpatient setting and we will have her followed up closely with her PCP     Perez Brantley MD  04/14/20 1792

## 2020-04-14 NOTE — DISCHARGE INSTRUCTIONS
It is very important that you continue your potassium tablets.  Take your Zofran ODT every 4-6 hours.  Follow-up with your primary care doctor to recheck your potassium and sodium in about 3 to 5 days.

## 2020-04-14 NOTE — ED TRIAGE NOTES
Pt to ER via PV. Pt states generalized abdominal pain x2 weeks. Pt states diarrhea in which she took Imodium on Wednesday then did not have BM for a couple of days. Pt took Milk of Magnesia yesterday and started having BM again tonight. Pt c/o of nausea and generalized weakness as well. Pt states she had labs drawn last Friday in which her results shows low K and NA - PCP put pt on meds for this but states she is having a hard time taking them due to nausea.     Pt arrived in mask.

## 2020-04-14 NOTE — ED PROVIDER NOTES
"EMERGENCY DEPARTMENT ENCOUNTER    Room Number:    Date seen:  2020  Time seen: 2:54 AM  PCP: Ame Dempsey MD  Historian: Patient    HPI:  Chief complaint: Nausea  A complete HPI/ROS/PMH/PSH/SH/FH are unobtainable due to:   Context:Kelle Soliz is a 65 y.o. female who presents to the ED with c/o nausea which started about 4 hours ago but denies vomiting.  Patient reports she had constant abdominal pressure with diarrhea for 2 weeks and took Imodium 5 days ago which resolved the diarrhea. Pt's abdominal pressure resolved earlier tonight and reports she is now in the ED due to persistent nausea. Pt did not have a bowel movement for 5 days and states \"I feel like I was backed up so I took milk of magnesia earlier tonight.\"  After her first dose of milk of magnesia, she reports that she had multiple episodes of watery diarrhea.  Patient denies recent antibiotics, urinary symptoms, chest pain, shortness of breath, cough, and fever.      In addition, patient reports she went to her primary care doctor about 5 days ago, had low potassium at that time, and patient is here because she is worried she is dehydrated and wants her potassium rechecked.  Patient currently has p.o. potassium tablets at home prescribed by her primary care doctor.    Timin hours ago  Duration: Onset      MEDICAL RECORD REVIEW  I reviewed old records.  Patient had a colonoscopy done by Dr. Blackmon on 2018 which showed diverticulosis, no diverticulitis, benign neoplasm of sigmoid colon, internal and external hemorrhoids.    ALLERGIES  Patient has no known allergies.    PAST MEDICAL HISTORY  Active Ambulatory Problems     Diagnosis Date Noted   • Hypersomnia with sleep apnea    • Snoring    • Hx of colonic polyps 2018   • Chest pain 2018   • DJD (degenerative joint disease) of knee 2019     Resolved Ambulatory Problems     Diagnosis Date Noted   • No Resolved Ambulatory Problems     Past Medical History: "   Diagnosis Date   • Arthritis    • Disease of thyroid gland    • Diverticulosis    • GERD (gastroesophageal reflux disease)    • Hemorrhoid    • Hx of colonic polyp    • Hyperlipidemia    • Hypertension    • Hypokalemia    • Hyponatremia    • Sleep apnea        PAST SURGICAL HISTORY  Past Surgical History:   Procedure Laterality Date   • CARDIAC CATHETERIZATION     •  SECTION      x2   • COLONOSCOPY N/A 3/6/2018    Procedure: COLONOSCOPY to terminal ileum with polypectomy (cold);  Surgeon: Eddie ELIZABETH MD;  Location: [facility] ENDOSCOPY;  Service:    • COLONOSCOPY W/ POLYPECTOMY     • FOOT ARTHRODESIS, MODIFIED KHAN Left    • KNEE ARTHROPLASTY Right    • LAPAROSCOPIC CHOLECYSTECTOMY     • LAPAROSCOPIC OOPHORECTOMY Bilateral    • TOTAL KNEE ARTHROPLASTY Left 3/28/2019    Procedure: LEFT TOTAL KNEE ARTHROPLASTY;  Surgeon: Neymar Hendricks MD;  Location: [facility] MAIN OR;  Service: Orthopedics       FAMILY HISTORY  Family History   Problem Relation Age of Onset   • Heart disease Mother    • Hypertension Mother    • Diabetes Mother    • Heart disease Father    • Stroke Father         carotid artery disease   • Hyperlipidemia Father    • Heart disease Brother    • Diabetes Brother    • Hyperlipidemia Brother    • Hypertension Maternal Grandmother    • Malig Hyperthermia Neg Hx        SOCIAL HISTORY  Social History     Socioeconomic History   • Marital status:      Spouse name: Not on file   • Number of children: Not on file   • Years of education: Not on file   • Highest education level: Not on file   Tobacco Use   • Smoking status: Never Smoker   • Smokeless tobacco: Never Used   Substance and Sexual Activity   • Alcohol use: No   • Drug use: No   • Sexual activity: Defer       REVIEW OF SYSTEMS  Review of Systems    All systems reviewed and negative except for those discussed in HPI.       PHYSICAL EXAM    ED Triage Vitals [20 0251]   Temp Heart Rate Resp BP SpO2   98.4 °F (36.9 °C) 72 18  -- 92 %      Temp src Heart Rate Source Patient Position BP Location FiO2 (%)   Tympanic Monitor -- -- --     Physical Exam    I have reviewed the triage vital signs and nursing notes.      GENERAL: not distressed  HENT: nares patent; dry mucous membranes  EYES: no scleral icterus  NECK: no ROM limitations  CV: regular rhythm, regular rate  RESPIRATORY: normal effort  ABDOMEN: soft; no abdominal tenderness on palpation  : deferred  MUSCULOSKELETAL: no deformity  NEURO: alert, moves all extremities, follows commands  SKIN: warm, dry      PROGRESS, DATA ANALYSIS, CONSULTS AND MEDICAL DECISION MAKING  All labs have been independently reviewed by me.  All radiology studies have been reviewed by me and discussed with radiologist dictating the report.  EKG's independently viewed and interpreted by me unless stated otherwise. Discussion below represents my analysis of pertinent findings related to patient's condition, differential diagnosis, treatment plan and final disposition.     ED Course as of Apr 14 0523 Tue Apr 14, 2020   0316 WBC(!): 11.62 [SS]   0338 Sodium(!): 125 [SS]   0338 Potassium(!): 3.0 [SS]      ED Course User Index  [SS] Marva Vogel PA-C     3:00 AM I offered patient nausea medication but patient is refusing at this time.    Reviewed previous lab results.  Patient has a history of low sodium.  Last sodium was last year which was 128 at that time.  I believe her hyponatremia is chronic.  Strongly advised her to follow-up with her primary care doctor return to recheck her sodium and potassium.  Patient agrees with plan of care and all questions addressed at this time.    Reviewed pt's history and workup with Dr. Brantley.  After a bedside evaluation, Dr. Brantley agrees with the plan of care.    (FOR DISCHARGE)The patient's history, physical exam, and lab findings were discussed with the physician, who also performed a face to face history and physical exam.  I discussed all results and noted any  "abnormalities with patient.  Discussed absoute need to recheck abnormalities with their family physician.  I answered any of the patient's questions.  Discussed plan for discharge, as there is no emergent indication for admission.  Pt is agreeable and understands need for follow up and repeat testing.  Pt is aware that discharge does not mean that nothing is wrong but it indicates no emergency is present and they must continue care with their family physician.  Pt is discharged with instructions to follow up with primary care doctor to have their blood pressure rechecked.     Disposition vitals:  /92 (BP Location: Right arm, Patient Position: Sitting)   Pulse 69   Temp 98.4 °F (36.9 °C) (Tympanic)   Resp 18   Ht 157.5 cm (62\")   Wt 97.5 kg (215 lb)   SpO2 96%   BMI 39.32 kg/m²       DIAGNOSIS  Final diagnoses:   Hypokalemia   Nausea       FOLLOW UP   Ame Dempsey MD  [address] [id number] [phone]    Call in 3 days  To recheck your potassium and sodium    [facility] Emergency Department  [address] [place] [address] [phone]    As needed, If symptoms worsen         Marva Vogel PA-C  04/14/20 0523    "

## 2020-04-14 NOTE — ED NOTES
PT CALLED  TO COME PICK HER UP. PT SAYS HE WILL BE HERE IN ABOUT 20 MINUTES.      Vivi Ortiz RN  04/14/20 0584

## 2020-04-14 NOTE — ED NOTES
THIS RN AT BEDSIDE DURING ERICKA REID ASSESSMENT. PT SAYS SHE IS NOT HAVING ABDOMINAL PAIN AT THIS TIME. IS HERE D/T NAUSEA. ERICKA REID OFFERED PT ANTIEMETIC MEDICATION WHICH PT DECLINED.     Vivi Ortiz, RN  04/14/20 8246

## 2020-04-17 ENCOUNTER — APPOINTMENT (OUTPATIENT)
Dept: GENERAL RADIOLOGY | Facility: HOSPITAL | Age: 65
End: 2020-04-17

## 2020-04-17 ENCOUNTER — HOSPITAL ENCOUNTER (EMERGENCY)
Facility: HOSPITAL | Age: 65
Discharge: HOME | End: 2020-04-17
Attending: EMERGENCY MEDICINE | Admitting: EMERGENCY MEDICINE

## 2020-04-17 VITALS
HEIGHT: 62 IN | WEIGHT: 213.85 LBS | HEART RATE: 52 BPM | RESPIRATION RATE: 18 BRPM | SYSTOLIC BLOOD PRESSURE: 158 MMHG | OXYGEN SATURATION: 100 % | DIASTOLIC BLOOD PRESSURE: 79 MMHG | TEMPERATURE: 98.9 F | BODY MASS INDEX: 39.35 KG/M2

## 2020-04-17 DIAGNOSIS — E87.1 HYPONATREMIA: ICD-10-CM

## 2020-04-17 DIAGNOSIS — E87.6 HYPOKALEMIA: ICD-10-CM

## 2020-04-17 DIAGNOSIS — R11.0 NAUSEA: Primary | ICD-10-CM

## 2020-04-17 LAB
ALBUMIN SERPL-MCNC: 4.3 G/DL (ref 3.5–5.2)
ALBUMIN/GLOB SERPL: 1.6 G/DL
ALP SERPL-CCNC: 80 U/L (ref 39–117)
ALT SERPL W P-5'-P-CCNC: 26 U/L (ref 1–33)
ANION GAP SERPL CALCULATED.3IONS-SCNC: 15.6 MMOL/L (ref 5–15)
AST SERPL-CCNC: 25 U/L (ref 1–32)
BACTERIA UR QL AUTO: ABNORMAL /HPF
BASOPHILS # BLD AUTO: 0.02 10*3/MM3 (ref 0–0.2)
BASOPHILS NFR BLD AUTO: 0.2 % (ref 0–1.5)
BILIRUB SERPL-MCNC: 1 MG/DL (ref 0.2–1.2)
BILIRUB UR QL STRIP: NEGATIVE
BUN BLD-MCNC: 8 MG/DL (ref 8–23)
BUN/CREAT SERPL: 9.1 (ref 7–25)
CALCIUM SPEC-SCNC: 9.4 MG/DL (ref 8.6–10.5)
CHLORIDE SERPL-SCNC: 86 MMOL/L (ref 98–107)
CLARITY UR: CLEAR
CO2 SERPL-SCNC: 24.4 MMOL/L (ref 22–29)
COLOR UR: YELLOW
CREAT BLD-MCNC: 0.88 MG/DL (ref 0.57–1)
DEPRECATED RDW RBC AUTO: 41.3 FL (ref 37–54)
EOSINOPHIL # BLD AUTO: 0.09 10*3/MM3 (ref 0–0.4)
EOSINOPHIL NFR BLD AUTO: 1 % (ref 0.3–6.2)
ERYTHROCYTE [DISTWIDTH] IN BLOOD BY AUTOMATED COUNT: 12.5 % (ref 12.3–15.4)
GFR SERPL CREATININE-BSD FRML MDRD: 64 ML/MIN/1.73
GLOBULIN UR ELPH-MCNC: 2.7 GM/DL
GLUCOSE BLD-MCNC: 114 MG/DL (ref 65–99)
GLUCOSE UR STRIP-MCNC: NEGATIVE MG/DL
HCT VFR BLD AUTO: 42 % (ref 34–46.6)
HGB BLD-MCNC: 14.5 G/DL (ref 12–15.9)
HGB UR QL STRIP.AUTO: ABNORMAL
HYALINE CASTS UR QL AUTO: ABNORMAL /LPF
IMM GRANULOCYTES # BLD AUTO: 0.05 10*3/MM3 (ref 0–0.05)
IMM GRANULOCYTES NFR BLD AUTO: 0.5 % (ref 0–0.5)
KETONES UR QL STRIP: ABNORMAL
LEUKOCYTE ESTERASE UR QL STRIP.AUTO: NEGATIVE
LIPASE SERPL-CCNC: 33 U/L (ref 13–60)
LYMPHOCYTES # BLD AUTO: 2.75 10*3/MM3 (ref 0.7–3.1)
LYMPHOCYTES NFR BLD AUTO: 29.1 % (ref 19.6–45.3)
MCH RBC QN AUTO: 31.1 PG (ref 26.6–33)
MCHC RBC AUTO-ENTMCNC: 34.5 G/DL (ref 31.5–35.7)
MCV RBC AUTO: 90.1 FL (ref 79–97)
MONOCYTES # BLD AUTO: 0.75 10*3/MM3 (ref 0.1–0.9)
MONOCYTES NFR BLD AUTO: 7.9 % (ref 5–12)
NEUTROPHILS # BLD AUTO: 5.78 10*3/MM3 (ref 1.7–7)
NEUTROPHILS NFR BLD AUTO: 61.3 % (ref 42.7–76)
NITRITE UR QL STRIP: NEGATIVE
NRBC BLD AUTO-RTO: 0 /100 WBC (ref 0–0.2)
PH UR STRIP.AUTO: 7 [PH] (ref 5–8)
PLATELET # BLD AUTO: 244 10*3/MM3 (ref 140–450)
PMV BLD AUTO: 9.1 FL (ref 6–12)
POTASSIUM BLD-SCNC: 3.1 MMOL/L (ref 3.5–5.2)
PROT SERPL-MCNC: 7 G/DL (ref 6–8.5)
PROT UR QL STRIP: NEGATIVE
RBC # BLD AUTO: 4.66 10*6/MM3 (ref 3.77–5.28)
RBC # UR: ABNORMAL /HPF
REF LAB TEST METHOD: ABNORMAL
SARS-COV-2 RNA RESP QL NAA+PROBE: NOT DETECTED
SODIUM BLD-SCNC: 126 MMOL/L (ref 136–145)
SP GR UR STRIP: 1.01 (ref 1–1.03)
SQUAMOUS #/AREA URNS HPF: ABNORMAL /HPF
UROBILINOGEN UR QL STRIP: ABNORMAL
WBC NRBC COR # BLD: 9.44 10*3/MM3 (ref 3.4–10.8)
WBC UR QL AUTO: ABNORMAL /HPF

## 2020-04-17 PROCEDURE — 87635 SARS-COV-2 COVID-19 AMP PRB: CPT | Performed by: PHYSICIAN ASSISTANT

## 2020-04-17 PROCEDURE — 81001 URINALYSIS AUTO W/SCOPE: CPT | Performed by: PHYSICIAN ASSISTANT

## 2020-04-17 PROCEDURE — 74018 RADEX ABDOMEN 1 VIEW: CPT

## 2020-04-17 PROCEDURE — 85025 COMPLETE CBC W/AUTO DIFF WBC: CPT | Performed by: PHYSICIAN ASSISTANT

## 2020-04-17 PROCEDURE — 99284 EMERGENCY DEPT VISIT MOD MDM: CPT

## 2020-04-17 PROCEDURE — 80053 COMPREHEN METABOLIC PANEL: CPT | Performed by: PHYSICIAN ASSISTANT

## 2020-04-17 PROCEDURE — 83690 ASSAY OF LIPASE: CPT | Performed by: PHYSICIAN ASSISTANT

## 2020-04-17 RX ORDER — POTASSIUM CHLORIDE 750 MG/1
40 CAPSULE, EXTENDED RELEASE ORAL ONCE
Status: COMPLETED | OUTPATIENT
Start: 2020-04-17 | End: 2020-04-17

## 2020-04-17 RX ORDER — ONDANSETRON 4 MG/1
4 TABLET, ORALLY DISINTEGRATING ORAL EVERY 8 HOURS PRN
Qty: 15 TABLET | Refills: 0 | Status: SHIPPED | OUTPATIENT
Start: 2020-04-17 | End: 2020-05-28

## 2020-04-17 RX ORDER — POTASSIUM CHLORIDE 20 MEQ/1
20 TABLET, EXTENDED RELEASE ORAL DAILY
Qty: 10 TABLET | Refills: 0 | Status: SHIPPED | OUTPATIENT
Start: 2020-04-17 | End: 2020-04-24

## 2020-04-17 RX ORDER — AMOXICILLIN 500 MG/1
CAPSULE ORAL
COMMUNITY
Start: 2020-02-28 | End: 2020-05-03 | Stop reason: HOSPADM

## 2020-04-17 RX ORDER — SODIUM CHLORIDE 0.9 % (FLUSH) 0.9 %
10 SYRINGE (ML) INJECTION AS NEEDED
Status: DISCONTINUED | OUTPATIENT
Start: 2020-04-17 | End: 2020-04-17 | Stop reason: HOSPADM

## 2020-04-17 RX ADMIN — POTASSIUM CHLORIDE 40 MEQ: 10 CAPSULE, COATED, EXTENDED RELEASE ORAL at 04:12

## 2020-04-17 RX ADMIN — SODIUM CHLORIDE 1000 ML: 9 INJECTION, SOLUTION INTRAVENOUS at 04:15

## 2020-04-17 NOTE — ED PROVIDER NOTES
EMERGENCY DEPARTMENT ENCOUNTER    Room Number:  05/05  Date of encounter:  4/17/2020  PCP: Ame Dempsey MD  Historian: Patient      HPI:  Chief Complaint: Abdominal pain, nausea, vomiting.      Context: Kelle Soliz is a 65 y.o. female who presents to the ED c/o nausea, bloating, lower abdominal fullness.  Patient states the symptoms began approximately 3 weeks ago and have been constant.  She states there is been significant nausea but no vomiting during this time.  Initially when the symptoms started she had diarrhea.  She discussed this with her primary care provider who had her to take Imodium which gave her temporary constipation.  She denies any blood in the stool or dark tarry stools.  She further denies chest pain, shortness of breath, fever, chills, sharp abdominal pain, dysuria, frequency or other symptoms associated with UTI, or any skin rash.  She was seen in the emergency department on April 14 2020 for the same symptoms.  She is followed by Dr. Blackmon for colonic polyps and routine colonoscopies.  The patient has had past surgeries including a lap naman and a bilateral oophorectomy.  Lastly she mentions that her  had recently been sick with a cough, shortness of breath, fever and chills and she is concerned that perhaps she has been exposed to COVID-19 and is just having the GI symptoms.    PAST MEDICAL HISTORY   Active Ambulatory Problems     Diagnosis Date Noted   • Hypersomnia with sleep apnea    • Snoring    • Hx of colonic polyps 02/09/2018   • Chest pain 09/17/2018   • DJD (degenerative joint disease) of knee 03/28/2019     Resolved Ambulatory Problems     Diagnosis Date Noted   • No Resolved Ambulatory Problems     Past Medical History:   Diagnosis Date   • Arthritis    • Disease of thyroid gland    • Diverticulosis    • GERD (gastroesophageal reflux disease)    • Hemorrhoid    • Hx of colonic polyp    • Hyperlipidemia    • Hypertension    • Hypokalemia    • Hyponatremia    •  Sleep apnea          PAST SURGICAL HISTORY  Past Surgical History:   Procedure Laterality Date   • CARDIAC CATHETERIZATION     •  SECTION      x2   • COLONOSCOPY N/A 3/6/2018    Procedure: COLONOSCOPY to terminal ileum with polypectomy (cold);  Surgeon: Eddie ELIZABETH MD;  Location: [facility] ENDOSCOPY;  Service:    • COLONOSCOPY W/ POLYPECTOMY     • FOOT ARTHRODESIS, MODIFIED KHAN Left    • KNEE ARTHROPLASTY Right    • LAPAROSCOPIC CHOLECYSTECTOMY     • LAPAROSCOPIC OOPHORECTOMY Bilateral    • TOTAL KNEE ARTHROPLASTY Left 3/28/2019    Procedure: LEFT TOTAL KNEE ARTHROPLASTY;  Surgeon: Neymar Hendricks MD;  Location: [facility] MAIN OR;  Service: Orthopedics         FAMILY HISTORY  Family History   Problem Relation Age of Onset   • Heart disease Mother    • Hypertension Mother    • Diabetes Mother    • Heart disease Father    • Stroke Father         carotid artery disease   • Hyperlipidemia Father    • Heart disease Brother    • Diabetes Brother    • Hyperlipidemia Brother    • Hypertension Maternal Grandmother    • Malig Hyperthermia Neg Hx          SOCIAL HISTORY  Social History     Socioeconomic History   • Marital status:      Spouse name: Not on file   • Number of children: Not on file   • Years of education: Not on file   • Highest education level: Not on file   Tobacco Use   • Smoking status: Never Smoker   • Smokeless tobacco: Never Used   Substance and Sexual Activity   • Alcohol use: No   • Drug use: No   • Sexual activity: Defer         ALLERGIES  Patient has no known allergies.        REVIEW OF SYSTEMS  Review of Systems     All systems reviewed and negative except for those discussed in HPI.       PHYSICAL EXAM    I have reviewed the triage vital signs and nursing notes.    ED Triage Vitals [20 0300]   Temp Heart Rate Resp BP SpO2   98.9 °F (37.2 °C) 79 18 -- 95 %      Temp src Heart Rate Source Patient Position BP Location FiO2 (%)   Tympanic Monitor -- -- --       Physical  Exam  GENERAL: The patient is well nourished and does not appear distressed at this time  HENT: nares patent, mucous membranes normal  EYES: no scleral icterus, mucous membranes are normal.  CV: regular rhythm, regular rate, without rubs murmurs or gallops  RESPIRATORY: normal effort, lungs clear to auscultation bilaterally  ABDOMEN: soft, questionable lower abdominal tenderness without guarding rebound or notable mass.  Bowel sounds normal.  MUSCULOSKELETAL: no deformity  NEURO: alert, moves all extremities, follows commands, no focal deficits  PSYC: Patient is somewhat tearful during HPI and physical exam.  SKIN: warm, dry, no skin rash noted        LAB RESULTS  Recent Results (from the past 24 hour(s))   Comprehensive Metabolic Panel    Collection Time: 04/17/20  3:13 AM   Result Value Ref Range    Glucose 114 (H) 65 - 99 mg/dL    BUN 8 8 - 23 mg/dL    Creatinine 0.88 0.57 - 1.00 mg/dL    Sodium 126 (L) 136 - 145 mmol/L    Potassium 3.1 (L) 3.5 - 5.2 mmol/L    Chloride 86 (L) 98 - 107 mmol/L    CO2 24.4 22.0 - 29.0 mmol/L    Calcium 9.4 8.6 - 10.5 mg/dL    Total Protein 7.0 6.0 - 8.5 g/dL    Albumin 4.30 3.50 - 5.20 g/dL    ALT (SGPT) 26 1 - 33 U/L    AST (SGOT) 25 1 - 32 U/L    Alkaline Phosphatase 80 39 - 117 U/L    Total Bilirubin 1.0 0.2 - 1.2 mg/dL    eGFR Non African Amer 64 >60 mL/min/1.73    Globulin 2.7 gm/dL    A/G Ratio 1.6 g/dL    BUN/Creatinine Ratio 9.1 7.0 - 25.0    Anion Gap 15.6 (H) 5.0 - 15.0 mmol/L   Lipase    Collection Time: 04/17/20  3:13 AM   Result Value Ref Range    Lipase 33 13 - 60 U/L   CBC Auto Differential    Collection Time: 04/17/20  3:13 AM   Result Value Ref Range    WBC 9.44 3.40 - 10.80 10*3/mm3    RBC 4.66 3.77 - 5.28 10*6/mm3    Hemoglobin 14.5 12.0 - 15.9 g/dL    Hematocrit 42.0 34.0 - 46.6 %    MCV 90.1 79.0 - 97.0 fL    MCH 31.1 26.6 - 33.0 pg    MCHC 34.5 31.5 - 35.7 g/dL    RDW 12.5 12.3 - 15.4 %    RDW-SD 41.3 37.0 - 54.0 fl    MPV 9.1 6.0 - 12.0 fL    Platelets 244 140  - 450 10*3/mm3    Neutrophil % 61.3 42.7 - 76.0 %    Lymphocyte % 29.1 19.6 - 45.3 %    Monocyte % 7.9 5.0 - 12.0 %    Eosinophil % 1.0 0.3 - 6.2 %    Basophil % 0.2 0.0 - 1.5 %    Immature Grans % 0.5 0.0 - 0.5 %    Neutrophils, Absolute 5.78 1.70 - 7.00 10*3/mm3    Lymphocytes, Absolute 2.75 0.70 - 3.10 10*3/mm3    Monocytes, Absolute 0.75 0.10 - 0.90 10*3/mm3    Eosinophils, Absolute 0.09 0.00 - 0.40 10*3/mm3    Basophils, Absolute 0.02 0.00 - 0.20 10*3/mm3    Immature Grans, Absolute 0.05 0.00 - 0.05 10*3/mm3    nRBC 0.0 0.0 - 0.2 /100 WBC   Urinalysis With Microscopic If Indicated (No Culture) - Urine, Clean Catch    Collection Time: 04/17/20  3:31 AM   Result Value Ref Range    Color, UA Yellow Yellow, Straw    Appearance, UA Clear Clear    pH, UA 7.0 5.0 - 8.0    Specific Gravity, UA 1.010 1.005 - 1.030    Glucose, UA Negative Negative    Ketones, UA Trace (A) Negative    Bilirubin, UA Negative Negative    Blood, UA Small (1+) (A) Negative    Protein, UA Negative Negative    Leuk Esterase, UA Negative Negative    Nitrite, UA Negative Negative    Urobilinogen, UA 0.2 E.U./dL 0.2 - 1.0 E.U./dL   Urinalysis, Microscopic Only - Urine, Clean Catch    Collection Time: 04/17/20  3:31 AM   Result Value Ref Range    RBC, UA 3-5 (A) None Seen, 0-2 /HPF    WBC, UA 0-2 None Seen, 0-2 /HPF    Bacteria, UA None Seen None Seen /HPF    Squamous Epithelial Cells, UA 3-6 (A) None Seen, 0-2 /HPF    Hyaline Casts, UA None Seen None Seen /LPF    Methodology Manual Light Microscopy    SARS-CoV-2, PCR (IN-HOUSE), NP Swab in Transport Media - Swab, Nasopharynx    Collection Time: 04/17/20  4:23 AM   Result Value Ref Range    COVID19 Not Detected Not Detected       Ordered the above labs and independently reviewed the results.        RADIOLOGY  Xr Abdomen Kub    Result Date: 4/17/2020  ABDOMEN KUB  CLINICAL HISTORY: Abdomen pain. Possible obstruction.  3 supine views of the abdomen were obtained. The images are suboptimal due to the  patient's large size. However, the bowel gas pattern appears within normal limits. There is no evidence of obstruction. No significant intra-abdominal calcifications are identified. There is mild smooth levoscoliosis centered in the mid lumbar region.  This report was finalized on 4/17/2020 5:11 AM by Dr. Tarik Canada M.D.        I ordered the above noted radiological studies. Reviewed by me and discussed with radiologist.  See dictation for official radiology interpretation.      PROCEDURES    Procedures      MEDICATIONS GIVEN IN ER    Medications   sodium chloride 0.9 % flush 10 mL (has no administration in time range)   sodium chloride 0.9 % bolus 1,000 mL (0 mL Intravenous Stopped 4/17/20 0449)   potassium chloride (MICRO-K) CR capsule 40 mEq (40 mEq Oral Given 4/17/20 6148)         PROGRESS, DATA ANALYSIS, CONSULTS, AND MEDICAL DECISION MAKING    All labs have been independently reviewed by me.  All radiology studies have been reviewed by me and discussed with radiologist dictating the report.   EKG's independently viewed and interpreted by me.  Discussion below represents my analysis of pertinent findings related to patient's condition, differential diagnosis, treatment plan and final disposition.    DDX: SBO, Diverticulitis, Infectious Colitis,  IBD, IBS, Gastritis,  pathology.  Given the patient's work-up, 3 weeks of symptoms, history, and physical exam suspect a more benign process such as gastritis or IBS.  Will treat the patient symptoms with Zofran, reassurance, and have the patient follow-up with Dr. Blackmon for further evaluation.  Will also treat the patient's hypokalemia and hyponatremia conservatively and have her to follow-up with Dr. Garrido to ensure these lab values normalize.    ED Course as of Apr 17 0651 Fri Apr 17, 2020   0307 Patient was seen in the emergency department 4/14/2020 for nausea.  She had a UA CBC CMP lipase and a CT abdomen and pelvis with contrast at that time.  Her  white count was 11.62, sodium was 125, her potassium was 3.0.  Her CT abdomen and pelvis showed diverticulosis without diverticulitis a 1.4 calcified splenic artery aneurysm and was otherwise unremarkable.  The patient was treated conservatively with antiemetics and discharged home to follow-up with her primary care provider.    [RC]   0312 The patient is followed by Dr. Blackmon for colonic polyps.  She last had a colonoscopy March 2016.    [RC]      ED Course User Index  [RC] Rizwan Latif III, PA           Patient was placed in face mask in first look. Patient was wearing facemask when I entered the room and throughout our encounter. I wore full protective equipment throughout this patient encounter including a face mask, and gloves. Hand hygiene was performed before donning protective equipment and after removal when leaving the room.    AS OF 06:50 VITALS:    BP - 158/79  HR - 52  TEMP - 98.9 °F (37.2 °C) (Tympanic)  O2 SATS - 100%        DIAGNOSIS  Final diagnoses:   Nausea   Hyponatremia   Hypokalemia         DISPOSITION  DISCHARGE    Patient discharged in stable condition.    Reviewed implications of results, diagnosis, meds, responsibility to follow up, warning signs and symptoms of possible worsening, potential complications and reasons to return to ER.    Patient/Family voiced understanding of above instructions.    Discussed plan for discharge, as there is no emergent indication for admission. Patient referred to primary care provider for BP management due to today's BP. Pt/family is agreeable and understands need for follow up and repeat testing.  Pt is aware that discharge does not mean that nothing is wrong but it indicates no emergency is present that requires admission and they must continue care with follow-up as given below or physician of their choice.     FOLLOW-UP  Eddie Blackmon MD  [address] [id number] [phone]    Schedule an appointment as soon  as possible for a visit   For further evaluation and treatment on ongoing nausea and abnormal bowel movements    Ame Dempsey MD  [address]  [place] [address]  [phone]    Schedule an appointment as soon as possible for a visit   For further a repeat lab draw to ensure the hypokalemia and hyponatremia have resolved.         Medication List      New Prescriptions    potassium chloride 20 MEQ CR tablet  Commonly known as:  K-DUR,KLOR-CON  Take 1 tablet by mouth Daily for 7 days.        Changed    * ondansetron ODT 4 MG disintegrating tablet  Commonly known as:  ZOFRAN-ODT  Place 1 tablet on the tongue 4 (Four) Times a Day As Needed for Nausea or   Vomiting for up to 7 days.  What changed:  Another medication with the same name was added. Make sure   you understand how and when to take each.     * ondansetron ODT 4 MG disintegrating tablet  Commonly known as:  Zofran ODT  Place 1 tablet on the tongue Every 8 (Eight) Hours As Needed for Nausea or   Vomiting.  What changed:  You were already taking a medication with the same name,   and this prescription was added. Make sure you understand how and when to   take each.         * This list has 2 medication(s) that are the same as other medications   prescribed for you. Read the directions carefully, and ask your doctor or   other care provider to review them with you.                       Rizwan Latif III, PA  04/17/20 0559       Rizwan Latif III, PA  04/17/20 0650       Rizwan Latif III, PA  04/17/20 0651

## 2020-04-17 NOTE — ED PROVIDER NOTES
The MOUNIKA and I have discussed this patients history, physical exam, and treatment plan. I have reviewed the documentation and personally had a face to face interaction with the patient. I affirm the documentation and agree with the treatment and plan.  The following note describes my personal findings    This patient is a 65-year-old female presenting to the emergency department for an intermittent 3-week history of abdominal cramping as well as nausea and diarrhea.  She states that she took Imodium for the diarrhea which caused constipation.  She then took MiraLAX which improved the constipation and the diarrhea began once again tonight.  She states that the cramps are in the lower abdomen area.    Exam: pt is resting comfortably in no acute distress and without gross neurological deficit.  Pts CV exam is WNL without murmur.  Lungs are clear bilaterally. Abdomen is soft and non-tender throughout    Plan: Awaiting laboratory results as well as KUB to assess the abdomen.  Will hydrate and treat patient's symptoms appropriately.      The patient was wearing a facemask upon entrance into the room and remained in such throughout their visit.  I was wearing PPE including a facemask as well as gloves at any point entering the room and throughout the visit     Jensen Oglesby MD  04/17/20 0626       Jensen Oglesby MD  04/17/20 0654

## 2020-04-17 NOTE — ED TRIAGE NOTES
Pt to ER via PV c/o nausea and abdominal pain 4/10. Pt reports being seen here on Monday for the same. Pt reports decrease in appetite and pain/nausea starting after eating this evening. Mask placed on pt in triage.

## 2020-04-17 NOTE — ED NOTES
All appropriate PPE worn by this RN during pt visit. Pt masked in triage, pt kept mask on during ED visit.      Radha Orellana RN  04/17/20 0644       Radha Orellana RN  04/17/20 0644

## 2020-04-17 NOTE — DISCHARGE INSTRUCTIONS
Call and follow up with Dr Blackmon for further evaluation of the ongoing nausea and abnormal bowel movements you are experiencing.    Call and follow up with Dr King in 1 week for repeat lab work to ensure your hyponatremia and hypokalemia have resolved.  Eat a potassium rich diet and slightly increase your sodium intake over the course of the next week (see handout).

## 2020-04-23 ENCOUNTER — EPISODE CHANGES (OUTPATIENT)
Dept: CASE MANAGEMENT | Facility: OTHER | Age: 65
End: 2020-04-23

## 2020-04-24 ENCOUNTER — HOSPITAL ENCOUNTER (EMERGENCY)
Facility: HOSPITAL | Age: 65
Discharge: HOME | End: 2020-04-25
Attending: EMERGENCY MEDICINE

## 2020-04-24 DIAGNOSIS — R10.13 ABDOMINAL PAIN, ACUTE, EPIGASTRIC: Primary | ICD-10-CM

## 2020-04-24 DIAGNOSIS — E87.1 HYPONATREMIA: ICD-10-CM

## 2020-04-24 DIAGNOSIS — R10.13 DYSPEPSIA: ICD-10-CM

## 2020-04-24 DIAGNOSIS — E87.6 HYPOKALEMIA: ICD-10-CM

## 2020-04-24 DIAGNOSIS — R11.0 NAUSEA: ICD-10-CM

## 2020-04-24 PROCEDURE — 99284 EMERGENCY DEPT VISIT MOD MDM: CPT

## 2020-04-25 ENCOUNTER — APPOINTMENT (OUTPATIENT)
Dept: GENERAL RADIOLOGY | Facility: HOSPITAL | Age: 65
End: 2020-04-25

## 2020-04-25 VITALS
HEART RATE: 62 BPM | OXYGEN SATURATION: 100 % | SYSTOLIC BLOOD PRESSURE: 158 MMHG | DIASTOLIC BLOOD PRESSURE: 82 MMHG | TEMPERATURE: 97.6 F | RESPIRATION RATE: 16 BRPM | BODY MASS INDEX: 39.11 KG/M2 | HEIGHT: 62 IN

## 2020-04-25 LAB
ALBUMIN SERPL-MCNC: 4.2 G/DL (ref 3.5–5.2)
ALBUMIN/GLOB SERPL: 1.6 G/DL
ALP SERPL-CCNC: 76 U/L (ref 39–117)
ALT SERPL W P-5'-P-CCNC: 26 U/L (ref 1–33)
ANION GAP SERPL CALCULATED.3IONS-SCNC: 12.9 MMOL/L (ref 5–15)
AST SERPL-CCNC: 20 U/L (ref 1–32)
BACTERIA UR QL AUTO: ABNORMAL /HPF
BASOPHILS # BLD AUTO: 0.03 10*3/MM3 (ref 0–0.2)
BASOPHILS NFR BLD AUTO: 0.4 % (ref 0–1.5)
BILIRUB SERPL-MCNC: 0.7 MG/DL (ref 0.2–1.2)
BILIRUB UR QL STRIP: NEGATIVE
BUN BLD-MCNC: 10 MG/DL (ref 8–23)
BUN/CREAT SERPL: 10 (ref 7–25)
CALCIUM SPEC-SCNC: 9.3 MG/DL (ref 8.6–10.5)
CHLORIDE SERPL-SCNC: 85 MMOL/L (ref 98–107)
CLARITY UR: CLEAR
CO2 SERPL-SCNC: 26.1 MMOL/L (ref 22–29)
COLOR UR: YELLOW
CREAT BLD-MCNC: 1 MG/DL (ref 0.57–1)
DEPRECATED RDW RBC AUTO: 43.9 FL (ref 37–54)
EOSINOPHIL # BLD AUTO: 0.09 10*3/MM3 (ref 0–0.4)
EOSINOPHIL NFR BLD AUTO: 1.1 % (ref 0.3–6.2)
ERYTHROCYTE [DISTWIDTH] IN BLOOD BY AUTOMATED COUNT: 13.2 % (ref 12.3–15.4)
GFR SERPL CREATININE-BSD FRML MDRD: 55 ML/MIN/1.73
GLOBULIN UR ELPH-MCNC: 2.6 GM/DL
GLUCOSE BLD-MCNC: 105 MG/DL (ref 65–99)
GLUCOSE UR STRIP-MCNC: NEGATIVE MG/DL
HCT VFR BLD AUTO: 40.8 % (ref 34–46.6)
HGB BLD-MCNC: 14.4 G/DL (ref 12–15.9)
HGB UR QL STRIP.AUTO: ABNORMAL
HYALINE CASTS UR QL AUTO: ABNORMAL /LPF
IMM GRANULOCYTES # BLD AUTO: 0.04 10*3/MM3 (ref 0–0.05)
IMM GRANULOCYTES NFR BLD AUTO: 0.5 % (ref 0–0.5)
KETONES UR QL STRIP: NEGATIVE
LEUKOCYTE ESTERASE UR QL STRIP.AUTO: NEGATIVE
LIPASE SERPL-CCNC: 34 U/L (ref 13–60)
LYMPHOCYTES # BLD AUTO: 2.3 10*3/MM3 (ref 0.7–3.1)
LYMPHOCYTES NFR BLD AUTO: 29.1 % (ref 19.6–45.3)
MCH RBC QN AUTO: 31.9 PG (ref 26.6–33)
MCHC RBC AUTO-ENTMCNC: 35.3 G/DL (ref 31.5–35.7)
MCV RBC AUTO: 90.5 FL (ref 79–97)
MONOCYTES # BLD AUTO: 0.81 10*3/MM3 (ref 0.1–0.9)
MONOCYTES NFR BLD AUTO: 10.3 % (ref 5–12)
NEUTROPHILS # BLD AUTO: 4.63 10*3/MM3 (ref 1.7–7)
NEUTROPHILS NFR BLD AUTO: 58.6 % (ref 42.7–76)
NITRITE UR QL STRIP: NEGATIVE
NRBC BLD AUTO-RTO: 0 /100 WBC (ref 0–0.2)
PH UR STRIP.AUTO: 7 [PH] (ref 5–8)
PLATELET # BLD AUTO: 232 10*3/MM3 (ref 140–450)
PMV BLD AUTO: 9.1 FL (ref 6–12)
POTASSIUM BLD-SCNC: 3.1 MMOL/L (ref 3.5–5.2)
PROT SERPL-MCNC: 6.8 G/DL (ref 6–8.5)
PROT UR QL STRIP: NEGATIVE
RBC # BLD AUTO: 4.51 10*6/MM3 (ref 3.77–5.28)
RBC # UR: ABNORMAL /HPF
REF LAB TEST METHOD: ABNORMAL
SODIUM BLD-SCNC: 124 MMOL/L (ref 136–145)
SP GR UR STRIP: 1.01 (ref 1–1.03)
SQUAMOUS #/AREA URNS HPF: ABNORMAL /HPF
TROPONIN T SERPL-MCNC: <0.01 NG/ML (ref 0–0.03)
TROPONIN T SERPL-MCNC: <0.01 NG/ML (ref 0–0.03)
UROBILINOGEN UR QL STRIP: ABNORMAL
WBC NRBC COR # BLD: 7.9 10*3/MM3 (ref 3.4–10.8)
WBC UR QL AUTO: ABNORMAL /HPF

## 2020-04-25 PROCEDURE — 93005 ELECTROCARDIOGRAM TRACING: CPT | Performed by: EMERGENCY MEDICINE

## 2020-04-25 PROCEDURE — 81001 URINALYSIS AUTO W/SCOPE: CPT | Performed by: EMERGENCY MEDICINE

## 2020-04-25 PROCEDURE — 96374 THER/PROPH/DIAG INJ IV PUSH: CPT

## 2020-04-25 PROCEDURE — 63710000001 ONDANSETRON ODT 4 MG TABLET DISPERSIBLE: Performed by: EMERGENCY MEDICINE

## 2020-04-25 PROCEDURE — 83690 ASSAY OF LIPASE: CPT | Performed by: EMERGENCY MEDICINE

## 2020-04-25 PROCEDURE — 85025 COMPLETE CBC W/AUTO DIFF WBC: CPT | Performed by: EMERGENCY MEDICINE

## 2020-04-25 PROCEDURE — 84484 ASSAY OF TROPONIN QUANT: CPT | Performed by: EMERGENCY MEDICINE

## 2020-04-25 PROCEDURE — 71045 X-RAY EXAM CHEST 1 VIEW: CPT

## 2020-04-25 PROCEDURE — 80053 COMPREHEN METABOLIC PANEL: CPT | Performed by: EMERGENCY MEDICINE

## 2020-04-25 PROCEDURE — 93010 ELECTROCARDIOGRAM REPORT: CPT | Performed by: INTERNAL MEDICINE

## 2020-04-25 RX ORDER — SCOLOPAMINE TRANSDERMAL SYSTEM 1 MG/1
1 PATCH, EXTENDED RELEASE TRANSDERMAL
Qty: 6 PATCH | Refills: 0 | Status: SHIPPED | OUTPATIENT
Start: 2020-04-25 | End: 2020-05-03 | Stop reason: HOSPADM

## 2020-04-25 RX ORDER — SCOLOPAMINE TRANSDERMAL SYSTEM 1 MG/1
1 PATCH, EXTENDED RELEASE TRANSDERMAL ONCE
Status: DISCONTINUED | OUTPATIENT
Start: 2020-04-25 | End: 2020-04-25 | Stop reason: HOSPADM

## 2020-04-25 RX ORDER — FAMOTIDINE 10 MG/ML
20 INJECTION, SOLUTION INTRAVENOUS ONCE
Status: COMPLETED | OUTPATIENT
Start: 2020-04-25 | End: 2020-04-25

## 2020-04-25 RX ORDER — POTASSIUM CHLORIDE 750 MG/1
20 TABLET, FILM COATED, EXTENDED RELEASE ORAL DAILY
Qty: 10 TABLET | Refills: 0 | Status: ON HOLD | OUTPATIENT
Start: 2020-04-25 | End: 2020-05-03 | Stop reason: SDUPTHER

## 2020-04-25 RX ORDER — FAMOTIDINE 10 MG
20 TABLET ORAL 2 TIMES DAILY
Qty: 30 TABLET | Refills: 0 | Status: SHIPPED | OUTPATIENT
Start: 2020-04-25 | End: 2020-05-03 | Stop reason: HOSPADM

## 2020-04-25 RX ORDER — POTASSIUM CHLORIDE 750 MG/1
40 CAPSULE, EXTENDED RELEASE ORAL ONCE
Status: COMPLETED | OUTPATIENT
Start: 2020-04-25 | End: 2020-04-25

## 2020-04-25 RX ORDER — LIDOCAINE HYDROCHLORIDE 20 MG/ML
15 SOLUTION OROPHARYNGEAL ONCE
Status: COMPLETED | OUTPATIENT
Start: 2020-04-25 | End: 2020-04-25

## 2020-04-25 RX ORDER — ALUMINA, MAGNESIA, AND SIMETHICONE 2400; 2400; 240 MG/30ML; MG/30ML; MG/30ML
15 SUSPENSION ORAL ONCE
Status: COMPLETED | OUTPATIENT
Start: 2020-04-25 | End: 2020-04-25

## 2020-04-25 RX ORDER — SODIUM CHLORIDE 0.9 % (FLUSH) 0.9 %
10 SYRINGE (ML) INJECTION AS NEEDED
Status: DISCONTINUED | OUTPATIENT
Start: 2020-04-25 | End: 2020-04-25 | Stop reason: HOSPADM

## 2020-04-25 RX ORDER — ONDANSETRON 4 MG/1
4 TABLET, ORALLY DISINTEGRATING ORAL ONCE
Status: COMPLETED | OUTPATIENT
Start: 2020-04-25 | End: 2020-04-25

## 2020-04-25 RX ADMIN — SODIUM CHLORIDE, PRESERVATIVE FREE 10 ML: 5 INJECTION INTRAVENOUS at 00:58

## 2020-04-25 RX ADMIN — LIDOCAINE HYDROCHLORIDE 15 ML: 20 SOLUTION ORAL; TOPICAL at 00:59

## 2020-04-25 RX ADMIN — ALUMINUM HYDROXIDE, MAGNESIUM HYDROXIDE, AND DIMETHICONE 15 ML: 400; 400; 40 SUSPENSION ORAL at 00:58

## 2020-04-25 RX ADMIN — ONDANSETRON 4 MG: 4 TABLET, ORALLY DISINTEGRATING ORAL at 03:47

## 2020-04-25 RX ADMIN — POTASSIUM CHLORIDE 40 MEQ: 10 CAPSULE, COATED, EXTENDED RELEASE ORAL at 02:34

## 2020-04-25 RX ADMIN — SCOPALAMINE 1 PATCH: 1 PATCH, EXTENDED RELEASE TRANSDERMAL at 00:53

## 2020-04-25 RX ADMIN — FAMOTIDINE 20 MG: 10 INJECTION INTRAVENOUS at 00:55

## 2020-04-25 RX ADMIN — SODIUM CHLORIDE 500 ML: 9 INJECTION, SOLUTION INTRAVENOUS at 02:39

## 2020-04-25 NOTE — ED TRIAGE NOTES
To ER via OV.  C/o Nausea, mid abd pressure, denies vomiting.  C/o symptoms x 4 weeks.  Seen here x 2 last week for similar symptoms.      Neg Covid on 4/17.

## 2020-04-25 NOTE — ED PROVIDER NOTES
EMERGENCY DEPARTMENT ENCOUNTER    Room Number:  11/11  Date of encounter:  4/25/2020  PCP: Ame Dempsey MD  Historian: Pt      HPI:  Chief Complaint: Epigastric pressure with nausea  A complete HPI/ROS/PMH/PSH/SH/FH are unobtainable due to: N/A   Context: Kelle Soliz is a 65 y.o. female who presents to the ED c/o acute recurrence of epigastric pressure that began around 1930 and just began to resolve approximately 35 minutes ago.  She is also had nausea all day long.  The symptoms been waxing and waning over the last 4 weeks.  No chest pain, shortness of breath, vomiting, change in bowel habits is noted.  She does note that she has to eat very small meals.  Tonight's episode did begin after eating supper but notes that not all episodes of similar discomfort started after food.  No reported fevers.  The discomfort peaked at 8 out of 10 but is only currently 1 out of 10.  Nausea is also improving at this time.      MEDICAL HISTORY REVIEW  CT abdomen pelvis dated 4/14/2020  IMPRESSION:  Mild sigmoid diverticulosis without evidence of  diverticulitis. Minimal hepatic steatosis. Otherwise unremarkable CT  imaging of the abdomen and pelvis. No acute process is identified.     This report was finalized on 4/14/2020 4:54 AM by Dr. Tarik Canada M.D.    PAST MEDICAL HISTORY  Active Ambulatory Problems     Diagnosis Date Noted   • Hypersomnia with sleep apnea    • Snoring    • Hx of colonic polyps 02/09/2018   • Chest pain 09/17/2018   • DJD (degenerative joint disease) of knee 03/28/2019     Resolved Ambulatory Problems     Diagnosis Date Noted   • No Resolved Ambulatory Problems     Past Medical History:   Diagnosis Date   • Arthritis    • Disease of thyroid gland    • Diverticulosis    • GERD (gastroesophageal reflux disease)    • Hemorrhoid    • Hx of colonic polyp    • Hyperlipidemia    • Hypertension    • Hypokalemia    • Hyponatremia    • Sleep apnea          PAST SURGICAL HISTORY  Past Surgical History:    Procedure Laterality Date   • CARDIAC CATHETERIZATION     •  SECTION      x2   • COLONOSCOPY N/A 3/6/2018    Procedure: COLONOSCOPY to terminal ileum with polypectomy (cold);  Surgeon: Eddie ELIZABETH MD;  Location: [facility] ENDOSCOPY;  Service:    • COLONOSCOPY W/ POLYPECTOMY     • FOOT ARTHRODESIS, MODIFIED KHAN Left    • KNEE ARTHROPLASTY Right    • LAPAROSCOPIC CHOLECYSTECTOMY     • LAPAROSCOPIC OOPHORECTOMY Bilateral    • TOTAL KNEE ARTHROPLASTY Left 3/28/2019    Procedure: LEFT TOTAL KNEE ARTHROPLASTY;  Surgeon: Neymar Hendricks MD;  Location: [facility] MAIN OR;  Service: Orthopedics         FAMILY HISTORY  Family History   Problem Relation Age of Onset   • Heart disease Mother    • Hypertension Mother    • Diabetes Mother    • Heart disease Father    • Stroke Father         carotid artery disease   • Hyperlipidemia Father    • Heart disease Brother    • Diabetes Brother    • Hyperlipidemia Brother    • Hypertension Maternal Grandmother    • Malig Hyperthermia Neg Hx          SOCIAL HISTORY  Social History     Socioeconomic History   • Marital status:      Spouse name: Not on file   • Number of children: Not on file   • Years of education: Not on file   • Highest education level: Not on file   Tobacco Use   • Smoking status: Never Smoker   • Smokeless tobacco: Never Used   Substance and Sexual Activity   • Alcohol use: No   • Drug use: No   • Sexual activity: Defer         ALLERGIES  Patient has no known allergies.        REVIEW OF SYSTEMS  Review of Systems     All systems reviewed and negative except for those discussed in HPI.       PHYSICAL EXAM    I have reviewed the triage vital signs and nursing notes.    ED Triage Vitals [20 2347]   Temp Heart Rate Resp BP SpO2   97.6 °F (36.4 °C) 70 16 -- 97 %      Temp src Heart Rate Source Patient Position BP Location FiO2 (%)   Tympanic Monitor -- -- --       Physical Exam    Physical Exam   Constitutional: No distress.   HENT:  Head:  Normocephalic and atraumatic.   Oropharynx: Mucous membranes are moist.   Eyes: No scleral icterus. No conjunctival pallor.  Neck: Painless range of motion noted. Neck supple.   Cardiovascular: Normal rate, regular rhythm and intact distal pulses.  Pulmonary/Chest: No respiratory distress. There are no wheezes, no rhonchi, and no rales.   Abdominal: Soft. There is no tenderness. There is no rebound and no guarding.   Musculoskeletal: Moves all extremities equally. There is no pedal edema or calf tenderness.   Neurological: Alert.  Baseline strength and sensation noted.   Skin: Skin is pink, warm, and dry. No pallor.   Psychiatric: Mood and affect normal.   Nursing note and vitals reviewed.    LAB RESULTS  Recent Results (from the past 24 hour(s))   Comprehensive Metabolic Panel    Collection Time: 04/25/20 12:38 AM   Result Value Ref Range    Glucose 105 (H) 65 - 99 mg/dL    BUN 10 8 - 23 mg/dL    Creatinine 1.00 0.57 - 1.00 mg/dL    Sodium 124 (L) 136 - 145 mmol/L    Potassium 3.1 (L) 3.5 - 5.2 mmol/L    Chloride 85 (L) 98 - 107 mmol/L    CO2 26.1 22.0 - 29.0 mmol/L    Calcium 9.3 8.6 - 10.5 mg/dL    Total Protein 6.8 6.0 - 8.5 g/dL    Albumin 4.20 3.50 - 5.20 g/dL    ALT (SGPT) 26 1 - 33 U/L    AST (SGOT) 20 1 - 32 U/L    Alkaline Phosphatase 76 39 - 117 U/L    Total Bilirubin 0.7 0.2 - 1.2 mg/dL    eGFR Non African Amer 55 (L) >60 mL/min/1.73    Globulin 2.6 gm/dL    A/G Ratio 1.6 g/dL    BUN/Creatinine Ratio 10.0 7.0 - 25.0    Anion Gap 12.9 5.0 - 15.0 mmol/L   Lipase    Collection Time: 04/25/20 12:38 AM   Result Value Ref Range    Lipase 34 13 - 60 U/L   Troponin    Collection Time: 04/25/20 12:38 AM   Result Value Ref Range    Troponin T <0.010 0.000 - 0.030 ng/mL   CBC Auto Differential    Collection Time: 04/25/20 12:38 AM   Result Value Ref Range    WBC 7.90 3.40 - 10.80 10*3/mm3    RBC 4.51 3.77 - 5.28 10*6/mm3    Hemoglobin 14.4 12.0 - 15.9 g/dL    Hematocrit 40.8 34.0 - 46.6 %    MCV 90.5 79.0 - 97.0 fL     MCH 31.9 26.6 - 33.0 pg    MCHC 35.3 31.5 - 35.7 g/dL    RDW 13.2 12.3 - 15.4 %    RDW-SD 43.9 37.0 - 54.0 fl    MPV 9.1 6.0 - 12.0 fL    Platelets 232 140 - 450 10*3/mm3    Neutrophil % 58.6 42.7 - 76.0 %    Lymphocyte % 29.1 19.6 - 45.3 %    Monocyte % 10.3 5.0 - 12.0 %    Eosinophil % 1.1 0.3 - 6.2 %    Basophil % 0.4 0.0 - 1.5 %    Immature Grans % 0.5 0.0 - 0.5 %    Neutrophils, Absolute 4.63 1.70 - 7.00 10*3/mm3    Lymphocytes, Absolute 2.30 0.70 - 3.10 10*3/mm3    Monocytes, Absolute 0.81 0.10 - 0.90 10*3/mm3    Eosinophils, Absolute 0.09 0.00 - 0.40 10*3/mm3    Basophils, Absolute 0.03 0.00 - 0.20 10*3/mm3    Immature Grans, Absolute 0.04 0.00 - 0.05 10*3/mm3    nRBC 0.0 0.0 - 0.2 /100 WBC   Urinalysis With Microscopic If Indicated (No Culture) - Urine, Clean Catch    Collection Time: 04/25/20  1:18 AM   Result Value Ref Range    Color, UA Yellow Yellow, Straw    Appearance, UA Clear Clear    pH, UA 7.0 5.0 - 8.0    Specific Gravity, UA 1.010 1.005 - 1.030    Glucose, UA Negative Negative    Ketones, UA Negative Negative    Bilirubin, UA Negative Negative    Blood, UA Trace (A) Negative    Protein, UA Negative Negative    Leuk Esterase, UA Negative Negative    Nitrite, UA Negative Negative    Urobilinogen, UA 1.0 E.U./dL 0.2 - 1.0 E.U./dL   Urinalysis, Microscopic Only - Urine, Clean Catch    Collection Time: 04/25/20  1:18 AM   Result Value Ref Range    RBC, UA 3-5 (A) None Seen, 0-2 /HPF    WBC, UA 3-5 (A) None Seen, 0-2 /HPF    Bacteria, UA 1+ (A) None Seen /HPF    Squamous Epithelial Cells, UA 7-12 (A) None Seen, 0-2 /HPF    Hyaline Casts, UA None Seen None Seen /LPF    Methodology Manual Light Microscopy    Troponin    Collection Time: 04/25/20  2:48 AM   Result Value Ref Range    Troponin T <0.010 0.000 - 0.030 ng/mL       Ordered the above labs and independently reviewed the results.        RADIOLOGY  Xr Chest 1 View    Result Date: 4/25/2020  PORTABLE CHEST 04/25/2020 AT 1206 HOURS  CLINICAL  HISTORY: Epigastric pain. Hypertension.  Compared to the previous chest dated 03/21/2019.  The lungs are fairly well-expanded and appear free of focal infiltrates or masses. There are no pleural effusions. The cardiomediastinal silhouette is unremarkable.  IMPRESSIONS: No evidence of active disease within the chest.  This report was finalized on 4/25/2020 12:22 AM by Dr. Tarik Canada M.D.        I ordered the above noted radiological studies. Reviewed by me and discussed with radiologist.  See dictation for official radiology interpretation.      PROCEDURES    Procedures        MEDICATIONS GIVEN IN ER    Medications   sodium chloride 0.9 % flush 10 mL (10 mL Intravenous Given 4/25/20 0058)   Scopolamine (TRANSDERM-SCOP) 1.5 MG/3DAYS patch 1 patch (1 patch Transdermal Medication Applied 4/25/20 0053)   aluminum-magnesium hydroxide-simethicone (MAALOX MAX) 400-400-40 MG/5ML suspension 15 mL (15 mL Oral Given 4/25/20 0058)   Lidocaine Viscous HCl (XYLOCAINE) 2 % mouth solution 15 mL (15 mL Mouth/Throat Given 4/25/20 0059)   famotidine (PEPCID) injection 20 mg (20 mg Intravenous Given 4/25/20 0055)   potassium chloride (MICRO-K) CR capsule 40 mEq (40 mEq Oral Given 4/25/20 0234)   sodium chloride 0.9 % bolus 500 mL (500 mL Intravenous New Bag 4/25/20 0239)         PROGRESS, DATA ANALYSIS, CONSULTS, AND MEDICAL DECISION MAKING    My differential diagnosis for abdominal pain includes but is not limited to:  Gastritis, gastroenteritis, peptic ulcer disease, GERD, esophageal perforation, acute appendicitis, mesenteric adenitis, Meckel’s diverticulum, epiploic appendagitis, diverticulitis, colon cancer, ulcerative colitis, Crohn’s disease, intussusception, small bowel obstruction, adhesions, ischemic bowel, perforated viscus, ileus, obstipation, biliary colic, cholecystitis, cholelithiasis, Michael[place]orge [place], hepatitis, pancreatitis, common bile duct obstruction, cholangitis, bile leak, splenic trauma, splenic rupture,  splenic infarction, splenic abscess, abdominal abscess, ascites, spontaneous bacterial peritonitis, hernia, UTI, cystitis, prostatitis, ureterolithiasis, urinary obstruction, ovarian cyst, torsion, pregnancy, ectopic pregnancy, PID, pelvic abscess, mittelschmerz, endometriosis, AAA, myocardial infarction, pneumonia, cancer, porphyria, DKA, medications, sickle cell, viral syndrome, zoster    All labs have been independently reviewed by me.  All radiology studies have been reviewed by me and discussed with radiologist dictating the report.   EKG's independently viewed and interpreted by me.  Discussion below represents my analysis of pertinent findings related to patient's condition, differential diagnosis, treatment plan and final disposition.      ED Course as of Apr 25 0336   Sat Apr 25, 2020   0011 I reviewed patient's prior evaluation.  Recommend evaluation for alternative causes of epigastric discomfort including MI.  Patient is agreeable with this plan.  Patient tells a story more concerning for gastroparesis or biliary disease.  It is also concerning to me that the patient only infrequently uses her Zantac.  I have encouraged her to use it twice daily, every day.    [RS]   0043 EKG           EKG time: 0026  Rhythm/Rate: Sinus, 60-65  P waves and OK: WESLEY within normal limits  QRS, axis: Narrow QRS complex  ST and T waves: No STEMI; QTC within normal limits    Interpreted Contemporaneously by me, independently viewed  Comparison: 3/21/2019-no acute change      [RS]   0137 Chronic hyponatremia and hypo-kalemia noted.  Unchanged from baseline.    [RS]   0142 Labs and x-ray results reviewed.  Await urinalysis and repeat troponin.  Treat hyponatremia and hypokalemia in ED.    [RS]   0335 No acute life threat is identified.  Patient agreeable with plan for discharge home and outpatient follow-up.  She already has gastroenterology appointment for Tuesday.    [RS]      ED Course User Index  [RS] Richard Mejia MD        AS OF 03:36 VITALS:    BP - 137/74  HR - 60  TEMP - 97.6 °F (36.4 °C) (Tympanic)  O2 SATS - 99%        DIAGNOSIS  Final diagnoses:   Abdominal pain, acute, epigastric   Nausea   Hyponatremia   Hypokalemia   Dyspepsia         DISPOSITION  DISCHARGE    Patient discharged in stable condition.    Reviewed implications of results, diagnosis, meds, responsibility to follow up, warning signs and symptoms of possible worsening, potential complications and reasons to return to ER.    Patient/Family voiced understanding of above instructions.    Discussed plan for discharge, as there is no emergent indication for admission. Patient referred to primary care provider for BP management due to today's BP. Pt/family is agreeable and understands need for follow up and repeat testing.  Pt is aware that discharge does not mean that nothing is wrong but it indicates no emergency is present that requires admission and they must continue care with follow-up as given below or physician of their choice.     FOLLOW-UP  Ame Dempsey MD  [address] [facility] [address]  [place] [address]  [phone]    Schedule an appointment as soon as possible for a visit   As needed    Eddie Blackmon MD  [address] [facility] [address]  [place] [address]  [phone]    Go in 3 days           Medication List      New Prescriptions    famotidine 10 MG tablet  Commonly known as:  PEPCID  Take 2 tablets by mouth 2 (Two) Times a Day.  Replaces:  raNITIdine 150 MG tablet     Scopolamine 1.5 MG/3DAYS patch  Commonly known as:  TRANSDERM-SCOP  Place 1 patch on the skin as directed by provider Every 72 (Seventy-Two)   Hours As Needed (dizziness).        Changed    * potassium chloride ER 20 MEQ tablet controlled-release ER tablet  Commonly known as:  K-TAB  What changed:  Another medication with the same name was added. Make sure   you understand how and when to take each.     * potassium chloride 10 MEQ CR tablet  Commonly known as:  K-DUR  Take 2  tablets by mouth Daily.  What changed:  You were already taking a medication with the same name,   and this prescription was added. Make sure you understand how and when to   take each.         * This list has 2 medication(s) that are the same as other medications   prescribed for you. Read the directions carefully, and ask your doctor or   other care provider to review them with you.            Stop    potassium chloride 20 MEQ CR tablet  Commonly known as:  K-SOLANGE JONES-CON     raNITIdine 150 MG tablet  Commonly known as:  ZANTAC  Replaced by:  famotidine 10 MG tablet               Richard Mejia MD  04/25/20 7658

## 2020-04-28 ENCOUNTER — TELEMEDICINE (OUTPATIENT)
Dept: GASTROENTEROLOGY | Facility: CLINIC | Age: 65
End: 2020-04-28

## 2020-04-28 DIAGNOSIS — R11.0 NAUSEA: Primary | ICD-10-CM

## 2020-04-28 DIAGNOSIS — R19.8 CHANGE IN BOWEL FUNCTION: ICD-10-CM

## 2020-04-28 DIAGNOSIS — R10.33 PERIUMBILICAL ABDOMINAL PAIN: ICD-10-CM

## 2020-04-28 PROCEDURE — 99214 OFFICE O/P EST MOD 30 MIN: CPT | Performed by: INTERNAL MEDICINE

## 2020-04-28 NOTE — PROGRESS NOTES
"No chief complaint on file.       Kelle Soliz is a  65 y.o. female here for a video visit for nausea, abdominal discomfort, malaise, intermittent diarrhea    HPI 65-year-old white female patient of Dr. Ame Dempsey was seen by video visit because of a one-month history of \"being sick\".  Specifically, she has had bouts of nausea on a regular basis but no vomiting.  She is also had some epigastric and infraumbilical discomfort and a change in bowel pattern with intermittent diarrhea as well as episodes of constipation.  She has been seen in the emergency room setting on 3 separate occasions including April 14, April 17, and April 25.  Lab work obtained during those visits did reflect some hypokalemia and hyponatremia.  The first visit did show some leukocytosis but that has subsided.  She had a CT scan done initially in the next visit had abdominal series and the most recent visit had chest x-ray and except for some diverticulosis all those studies were normal.  She has been treated with Zofran for nausea and histamine blocker therapy which has not afforded significant relief.  She recounts a 15 pound weight loss over the past month due to poor p.o. intake.  She denies any significant vertigo or balance problems.  We talked about the need to replace electrolytes and I encouraged her to try Pedialyte as well as some nutritional supplement as tolerated.  We also talked about further evaluation such as fluoroscopic evaluation of her GI tract and if negative possible endoscopic assessment.  She has been instructed to call with a progress report or if her symptoms progress or worsen to go to the emergency room.    Past Medical History:   Diagnosis Date   • Anemia in mid-40s   • Arthritis    • Cholelithiasis around 2000   • Colon polyp     several each colonoscopy, last 10 years   • Diabetes mellitus (CMS/HCC)     pre-diabetic, for several years   • Disease of thyroid gland    • Diverticulosis    • Fatty liver     " slightly fatty   • GERD (gastroesophageal reflux disease)    • Hemorrhoid    • Hernia     slight hiatal hernia   • Hx of colonic polyp    • Hyperlipidemia    • Hypertension    • Hypokalemia    • Hyponatremia    • Sleep apnea     cpap       Current Outpatient Medications   Medication Sig Dispense Refill   • acetaminophen (TYLENOL) 500 MG tablet Take 1,000 mg by mouth Every 6 (Six) Hours As Needed for Mild Pain .     • ALPRAZolam (XANAX) 0.25 MG tablet Take 0.25 mg by mouth As Needed (flying).     • amoxicillin (AMOXIL) 500 MG capsule TAKE FOUR CAPSULES BY MOUTH ONE HOUR BEFORE APPOINTMENT     • buPROPion XL (WELLBUTRIN XL) 300 MG 24 hr tablet Take 300 mg by mouth Every Morning.     • cholecalciferol (VITAMIN D3) 1000 units tablet Take 1,000 Units by mouth Daily.     • CLOBETASOL PROPIONATE EX Apply 1 application topically 2 (Two) Times a Week.     • EDARBYCLOR 40-25 MG tablet Take 1 tablet by mouth Every Morning.  7   • estradiol (ESTRACE) 0.1 MG/GM vaginal cream Insert 2 g into the vagina 1 (One) Time Per Week.     • famotidine (PEPCID) 10 MG tablet Take 2 tablets by mouth 2 (Two) Times a Day. 30 tablet 0   • levothyroxine (SYNTHROID, LEVOTHROID) 88 MCG tablet Take 88 mcg by mouth Daily.     • Melatonin 10 MG sublingual tablet Place 10 mg under the tongue At Night As Needed.     • montelukast (SINGULAIR) 10 MG tablet Take 10 mg by mouth Every Morning.     • nebivolol (BYSTOLIC) 20 MG tablet Take 40 mg by mouth Daily.     • ondansetron ODT (Zofran ODT) 4 MG disintegrating tablet Place 1 tablet on the tongue Every 8 (Eight) Hours As Needed for Nausea or Vomiting. 15 tablet 0   • potassium chloride (K-DUR) 10 MEQ CR tablet Take 2 tablets by mouth Daily. 10 tablet 0   • potassium chloride ER (K-TAB) 20 MEQ tablet controlled-release ER tablet Take 20 mEq by mouth 2 (Two) Times a Day.     • rosuvastatin (CRESTOR) 40 MG tablet Take 40 mg by mouth Daily.     • Scopolamine (TRANSDERM-SCOP) 1.5 MG/3DAYS patch Place 1 patch on  the skin as directed by provider Every 72 (Seventy-Two) Hours As Needed (dizziness). 6 patch 0   • sertraline (ZOLOFT) 50 MG tablet Take 50 mg by mouth As Needed (flying).       No current facility-administered medications for this visit.        PRN Meds:.    No Known Allergies    Social History     Socioeconomic History   • Marital status:      Spouse name: Not on file   • Number of children: Not on file   • Years of education: Not on file   • Highest education level: Not on file   Tobacco Use   • Smoking status: Never Smoker   • Smokeless tobacco: Never Used   Substance and Sexual Activity   • Alcohol use: No   • Drug use: No   • Sexual activity: Yes     Partners: Male       Family History   Problem Relation Age of Onset   • Heart disease Mother    • Hypertension Mother    • Diabetes Mother    • Heart disease Father    • Stroke Father         carotid artery disease   • Hyperlipidemia Father    • Alcohol abuse Father    • Heart disease Brother    • Diabetes Brother    • Hyperlipidemia Brother    • Hypertension Maternal Grandmother    • Malig Hyperthermia Neg Hx        Review of Systems   Constitutional: Negative for activity change, appetite change, fatigue and unexpected weight change.   HENT: Negative for congestion, facial swelling, sore throat, trouble swallowing and voice change.    Eyes: Negative for photophobia and visual disturbance.   Respiratory: Negative for cough and choking.    Cardiovascular: Negative for chest pain.   Gastrointestinal: Positive for abdominal pain, constipation, diarrhea and nausea. Negative for abdominal distention, anal bleeding, blood in stool, rectal pain and vomiting.   Endocrine: Negative for polyphagia.   Musculoskeletal: Negative for arthralgias, gait problem and joint swelling.   Skin: Negative for color change, pallor and rash.   Allergic/Immunologic: Negative for food allergies.   Neurological: Negative for speech difficulty and headaches.   Hematological: Does not  bruise/bleed easily.   Psychiatric/Behavioral: Negative for agitation, confusion and sleep disturbance.       There were no vitals filed for this visit.    Physical Exam unable to assess on video visit    ASSESSMENT   #1 persistent nausea: Patient has had gallbladder removed.  No precipitating factor such as change in medications or diet noted.  Does not seem to be of a central effect i.e. no vertigo.  #2 epigastric/infraumbilical pain  #3 change in bowel pattern      PLAN  Encourage electrolyte input as well as nutritional supplement  Switch to PPI, patient will acquire over-the-counter  Monitor electrolytes weekly  If symptoms progress or worsen return to the emergency room  May benefit from further GI evaluation in the form of fluoroscopic study or enteroscopy pending her progress.      No diagnosis found.

## 2020-04-28 NOTE — PATIENT INSTRUCTIONS
Patient to acquire Pedialyte for electrolyte replenishment  Patient to initiate nutritional supplements as tolerated  Advised she call with a progress report in 1 week or sooner as needed  Advised she have labs done to assess electrolyte status early next week

## 2020-04-29 ENCOUNTER — TELEPHONE (OUTPATIENT)
Dept: GASTROENTEROLOGY | Facility: CLINIC | Age: 65
End: 2020-04-29

## 2020-04-29 ENCOUNTER — HOSPITAL ENCOUNTER (INPATIENT)
Facility: HOSPITAL | Age: 65
LOS: 4 days | Discharge: HOME | End: 2020-05-03
Attending: EMERGENCY MEDICINE | Admitting: INTERNAL MEDICINE

## 2020-04-29 ENCOUNTER — EPISODE CHANGES (OUTPATIENT)
Dept: CASE MANAGEMENT | Facility: OTHER | Age: 65
End: 2020-04-29

## 2020-04-29 DIAGNOSIS — R53.1 GENERALIZED WEAKNESS: ICD-10-CM

## 2020-04-29 DIAGNOSIS — R11.0 NAUSEA: ICD-10-CM

## 2020-04-29 DIAGNOSIS — R63.0 LOSS OF APPETITE: ICD-10-CM

## 2020-04-29 DIAGNOSIS — E87.1 HYPONATREMIA: Primary | ICD-10-CM

## 2020-04-29 PROBLEM — K21.9 GERD WITHOUT ESOPHAGITIS: Status: ACTIVE | Noted: 2020-04-29

## 2020-04-29 PROBLEM — G47.33 OSA (OBSTRUCTIVE SLEEP APNEA): Status: ACTIVE | Noted: 2020-04-29

## 2020-04-29 PROBLEM — E87.6 HYPOKALEMIA: Status: ACTIVE | Noted: 2020-04-29

## 2020-04-29 LAB
ALBUMIN SERPL-MCNC: 4 G/DL (ref 3.5–5.2)
ALBUMIN/GLOB SERPL: 1.6 G/DL
ALP SERPL-CCNC: 68 U/L (ref 39–117)
ALT SERPL W P-5'-P-CCNC: 32 U/L (ref 1–33)
ANION GAP SERPL CALCULATED.3IONS-SCNC: 13.8 MMOL/L (ref 5–15)
AST SERPL-CCNC: 28 U/L (ref 1–32)
BACTERIA UR QL AUTO: NORMAL /HPF
BASOPHILS # BLD AUTO: 0.02 10*3/MM3 (ref 0–0.2)
BASOPHILS NFR BLD AUTO: 0.2 % (ref 0–1.5)
BILIRUB SERPL-MCNC: 0.5 MG/DL (ref 0.2–1.2)
BILIRUB UR QL STRIP: NEGATIVE
BUN BLD-MCNC: 13 MG/DL (ref 8–23)
BUN/CREAT SERPL: 11.5 (ref 7–25)
CALCIUM SPEC-SCNC: 9.3 MG/DL (ref 8.6–10.5)
CHLORIDE SERPL-SCNC: 82 MMOL/L (ref 98–107)
CLARITY UR: ABNORMAL
CO2 SERPL-SCNC: 28.2 MMOL/L (ref 22–29)
COLOR UR: YELLOW
CREAT BLD-MCNC: 1.13 MG/DL (ref 0.57–1)
DEPRECATED RDW RBC AUTO: 41.7 FL (ref 37–54)
EOSINOPHIL # BLD AUTO: 0.1 10*3/MM3 (ref 0–0.4)
EOSINOPHIL NFR BLD AUTO: 1.2 % (ref 0.3–6.2)
ERYTHROCYTE [DISTWIDTH] IN BLOOD BY AUTOMATED COUNT: 12.8 % (ref 12.3–15.4)
GFR SERPL CREATININE-BSD FRML MDRD: 48 ML/MIN/1.73
GLOBULIN UR ELPH-MCNC: 2.5 GM/DL
GLUCOSE BLD-MCNC: 113 MG/DL (ref 65–99)
GLUCOSE UR STRIP-MCNC: NEGATIVE MG/DL
HCT VFR BLD AUTO: 39.4 % (ref 34–46.6)
HGB BLD-MCNC: 14.2 G/DL (ref 12–15.9)
HGB UR QL STRIP.AUTO: ABNORMAL
HYALINE CASTS UR QL AUTO: NORMAL /LPF
IMM GRANULOCYTES # BLD AUTO: 0.04 10*3/MM3 (ref 0–0.05)
IMM GRANULOCYTES NFR BLD AUTO: 0.5 % (ref 0–0.5)
KETONES UR QL STRIP: NEGATIVE
LEUKOCYTE ESTERASE UR QL STRIP.AUTO: ABNORMAL
LIPASE SERPL-CCNC: 41 U/L (ref 13–60)
LYMPHOCYTES # BLD AUTO: 2.19 10*3/MM3 (ref 0.7–3.1)
LYMPHOCYTES NFR BLD AUTO: 26.9 % (ref 19.6–45.3)
MAGNESIUM SERPL-MCNC: 1.7 MG/DL (ref 1.6–2.4)
MCH RBC QN AUTO: 32.3 PG (ref 26.6–33)
MCHC RBC AUTO-ENTMCNC: 36 G/DL (ref 31.5–35.7)
MCV RBC AUTO: 89.5 FL (ref 79–97)
MONOCYTES # BLD AUTO: 0.79 10*3/MM3 (ref 0.1–0.9)
MONOCYTES NFR BLD AUTO: 9.7 % (ref 5–12)
NEUTROPHILS # BLD AUTO: 5 10*3/MM3 (ref 1.7–7)
NEUTROPHILS NFR BLD AUTO: 61.5 % (ref 42.7–76)
NITRITE UR QL STRIP: NEGATIVE
NRBC BLD AUTO-RTO: 0 /100 WBC (ref 0–0.2)
PH UR STRIP.AUTO: 7.5 [PH] (ref 5–8)
PLATELET # BLD AUTO: 228 10*3/MM3 (ref 140–450)
PMV BLD AUTO: 9.2 FL (ref 6–12)
POTASSIUM BLD-SCNC: 3.3 MMOL/L (ref 3.5–5.2)
PROT SERPL-MCNC: 6.5 G/DL (ref 6–8.5)
PROT UR QL STRIP: NEGATIVE
RBC # BLD AUTO: 4.4 10*6/MM3 (ref 3.77–5.28)
RBC # UR: NORMAL /HPF
REF LAB TEST METHOD: NORMAL
SODIUM BLD-SCNC: 124 MMOL/L (ref 136–145)
SODIUM UR-SCNC: <20 MMOL/L
SP GR UR STRIP: 1.01 (ref 1–1.03)
SQUAMOUS #/AREA URNS HPF: NORMAL /HPF
URATE SERPL-MCNC: 3 MG/DL (ref 2.4–5.7)
UROBILINOGEN UR QL STRIP: ABNORMAL
WBC NRBC COR # BLD: 8.14 10*3/MM3 (ref 3.4–10.8)
WBC UR QL AUTO: NORMAL /HPF

## 2020-04-29 PROCEDURE — 84550 ASSAY OF BLOOD/URIC ACID: CPT | Performed by: HOSPITALIST

## 2020-04-29 PROCEDURE — G0378 HOSPITAL OBSERVATION PER HR: HCPCS

## 2020-04-29 PROCEDURE — 84443 ASSAY THYROID STIM HORMONE: CPT | Performed by: HOSPITALIST

## 2020-04-29 PROCEDURE — 83690 ASSAY OF LIPASE: CPT | Performed by: EMERGENCY MEDICINE

## 2020-04-29 PROCEDURE — 84300 ASSAY OF URINE SODIUM: CPT | Performed by: EMERGENCY MEDICINE

## 2020-04-29 PROCEDURE — 85025 COMPLETE CBC W/AUTO DIFF WBC: CPT | Performed by: EMERGENCY MEDICINE

## 2020-04-29 PROCEDURE — 80053 COMPREHEN METABOLIC PANEL: CPT | Performed by: EMERGENCY MEDICINE

## 2020-04-29 PROCEDURE — 99284 EMERGENCY DEPT VISIT MOD MDM: CPT

## 2020-04-29 PROCEDURE — 83735 ASSAY OF MAGNESIUM: CPT | Performed by: EMERGENCY MEDICINE

## 2020-04-29 PROCEDURE — 81001 URINALYSIS AUTO W/SCOPE: CPT | Performed by: EMERGENCY MEDICINE

## 2020-04-29 RX ORDER — SODIUM CHLORIDE 0.9 % (FLUSH) 0.9 %
10 SYRINGE (ML) INJECTION AS NEEDED
Status: DISCONTINUED | OUTPATIENT
Start: 2020-04-29 | End: 2020-05-03 | Stop reason: HOSPADM

## 2020-04-29 RX ORDER — SODIUM CHLORIDE 0.9 % (FLUSH) 0.9 %
10 SYRINGE (ML) INJECTION EVERY 12 HOURS SCHEDULED
Status: DISCONTINUED | OUTPATIENT
Start: 2020-04-29 | End: 2020-05-03 | Stop reason: HOSPADM

## 2020-04-29 RX ORDER — ACETAMINOPHEN 160 MG/5ML
650 SOLUTION ORAL EVERY 4 HOURS PRN
Status: DISCONTINUED | OUTPATIENT
Start: 2020-04-29 | End: 2020-05-03 | Stop reason: HOSPADM

## 2020-04-29 RX ORDER — ACETAMINOPHEN 325 MG/1
650 TABLET ORAL EVERY 4 HOURS PRN
Status: DISCONTINUED | OUTPATIENT
Start: 2020-04-29 | End: 2020-05-03 | Stop reason: HOSPADM

## 2020-04-29 RX ORDER — ONDANSETRON 4 MG/1
4 TABLET, FILM COATED ORAL EVERY 6 HOURS PRN
Status: DISCONTINUED | OUTPATIENT
Start: 2020-04-29 | End: 2020-05-03 | Stop reason: HOSPADM

## 2020-04-29 RX ORDER — ACETAMINOPHEN 650 MG/1
650 SUPPOSITORY RECTAL EVERY 4 HOURS PRN
Status: DISCONTINUED | OUTPATIENT
Start: 2020-04-29 | End: 2020-05-03 | Stop reason: HOSPADM

## 2020-04-29 RX ORDER — SODIUM CHLORIDE 9 MG/ML
125 INJECTION, SOLUTION INTRAVENOUS CONTINUOUS
Status: DISCONTINUED | OUTPATIENT
Start: 2020-04-29 | End: 2020-04-30

## 2020-04-29 RX ORDER — SODIUM CHLORIDE 9 MG/ML
100 INJECTION, SOLUTION INTRAVENOUS CONTINUOUS
Status: DISCONTINUED | OUTPATIENT
Start: 2020-04-29 | End: 2020-05-01

## 2020-04-29 RX ORDER — MAGNESIUM SULFATE 1 G/100ML
1 INJECTION INTRAVENOUS ONCE
Status: COMPLETED | OUTPATIENT
Start: 2020-04-29 | End: 2020-04-30

## 2020-04-29 RX ORDER — ONDANSETRON 2 MG/ML
4 INJECTION INTRAMUSCULAR; INTRAVENOUS EVERY 6 HOURS PRN
Status: DISCONTINUED | OUTPATIENT
Start: 2020-04-29 | End: 2020-05-03 | Stop reason: HOSPADM

## 2020-04-29 RX ORDER — BISACODYL 10 MG
10 SUPPOSITORY, RECTAL RECTAL DAILY PRN
Status: DISCONTINUED | OUTPATIENT
Start: 2020-04-29 | End: 2020-05-03 | Stop reason: HOSPADM

## 2020-04-29 RX ORDER — BISACODYL 5 MG/1
5 TABLET, DELAYED RELEASE ORAL DAILY PRN
Status: DISCONTINUED | OUTPATIENT
Start: 2020-04-29 | End: 2020-05-03 | Stop reason: HOSPADM

## 2020-04-29 RX ADMIN — SODIUM CHLORIDE 125 ML/HR: 9 INJECTION, SOLUTION INTRAVENOUS at 22:18

## 2020-04-29 NOTE — TELEPHONE ENCOUNTER
----- Message from nIder Gonzales sent at 4/29/2020  1:02 PM EDT -----  Regarding: Update   Contact: [phone]  Daughter Ailyn called with an update for patient, Stated she is taking 2 Nexium a day, nausea is about the same, weakness is getting worse, she can barely stand up today. Stated getting the scope as soon as possible would be beneficial.

## 2020-04-29 NOTE — TELEPHONE ENCOUNTER
Called pt and advised of Dr Blackmon's note and pt verb understanding and states she will go to ER.

## 2020-04-29 NOTE — TELEPHONE ENCOUNTER
Given her persistence and progression of symptoms, I had advised her to return to the emergency room and would encourage this to rule out the possibility of electrolyte imbalance or dehydration.

## 2020-04-29 NOTE — TELEPHONE ENCOUNTER
Called pt and pt reports that she spoke with Dr Blackmon yesterday.  She reports that she did take 2 nexium this am.  She reports she is still having nausea and is feeling a lot weaker.  She has also been sleeping a lot.  She denies a fever , chills, and diarrhea. Pt is asking if she should go to ER for hydration.  Pt does report urinating without difficulty and her urine is not concentrated. Advised will send message to Dr Blackmon.

## 2020-04-30 LAB
ANION GAP SERPL CALCULATED.3IONS-SCNC: 12.6 MMOL/L (ref 5–15)
BUN BLD-MCNC: 9 MG/DL (ref 8–23)
BUN/CREAT SERPL: 9.5 (ref 7–25)
CALCIUM SPEC-SCNC: 8.6 MG/DL (ref 8.6–10.5)
CHLORIDE SERPL-SCNC: 86 MMOL/L (ref 98–107)
CHLORIDE UR-SCNC: 80 MMOL/L
CO2 SERPL-SCNC: 26.4 MMOL/L (ref 22–29)
CREAT BLD-MCNC: 0.95 MG/DL (ref 0.57–1)
CREAT UR-MCNC: 48.3 MG/DL
DEPRECATED RDW RBC AUTO: 42.2 FL (ref 37–54)
ERYTHROCYTE [DISTWIDTH] IN BLOOD BY AUTOMATED COUNT: 13 % (ref 12.3–15.4)
GFR SERPL CREATININE-BSD FRML MDRD: 59 ML/MIN/1.73
GLUCOSE BLD-MCNC: 102 MG/DL (ref 65–99)
HCT VFR BLD AUTO: 38.1 % (ref 34–46.6)
HGB BLD-MCNC: 13.6 G/DL (ref 12–15.9)
MAGNESIUM SERPL-MCNC: 2.3 MG/DL (ref 1.6–2.4)
MCH RBC QN AUTO: 31.8 PG (ref 26.6–33)
MCHC RBC AUTO-ENTMCNC: 35.7 G/DL (ref 31.5–35.7)
MCV RBC AUTO: 89 FL (ref 79–97)
OSMOLALITY SERPL: 277 MOSM/KG (ref 280–301)
OSMOLALITY UR: 310 MOSM/KG
PHOSPHATE SERPL-MCNC: 3.1 MG/DL (ref 2.5–4.5)
PLATELET # BLD AUTO: 207 10*3/MM3 (ref 140–450)
PMV BLD AUTO: 9.3 FL (ref 6–12)
POTASSIUM BLD-SCNC: 2.9 MMOL/L (ref 3.5–5.2)
POTASSIUM BLD-SCNC: 4 MMOL/L (ref 3.5–5.2)
RBC # BLD AUTO: 4.28 10*6/MM3 (ref 3.77–5.28)
SODIUM BLD-SCNC: 125 MMOL/L (ref 136–145)
SODIUM UR-SCNC: 33 MMOL/L
TSH SERPL DL<=0.05 MIU/L-ACNC: 0.75 UIU/ML (ref 0.27–4.2)
WBC NRBC COR # BLD: 7.49 10*3/MM3 (ref 3.4–10.8)

## 2020-04-30 PROCEDURE — 25010000002 ONDANSETRON PER 1 MG: Performed by: HOSPITALIST

## 2020-04-30 PROCEDURE — 82570 ASSAY OF URINE CREATININE: CPT | Performed by: INTERNAL MEDICINE

## 2020-04-30 PROCEDURE — 83735 ASSAY OF MAGNESIUM: CPT | Performed by: HOSPITALIST

## 2020-04-30 PROCEDURE — 83935 ASSAY OF URINE OSMOLALITY: CPT | Performed by: INTERNAL MEDICINE

## 2020-04-30 PROCEDURE — 83930 ASSAY OF BLOOD OSMOLALITY: CPT | Performed by: INTERNAL MEDICINE

## 2020-04-30 PROCEDURE — 99254 IP/OBS CNSLTJ NEW/EST MOD 60: CPT | Performed by: INTERNAL MEDICINE

## 2020-04-30 PROCEDURE — 25010000002 ONDANSETRON PER 1 MG: Performed by: INTERNAL MEDICINE

## 2020-04-30 PROCEDURE — 85027 COMPLETE CBC AUTOMATED: CPT | Performed by: HOSPITALIST

## 2020-04-30 PROCEDURE — 84100 ASSAY OF PHOSPHORUS: CPT | Performed by: HOSPITALIST

## 2020-04-30 PROCEDURE — 84300 ASSAY OF URINE SODIUM: CPT | Performed by: INTERNAL MEDICINE

## 2020-04-30 PROCEDURE — 82436 ASSAY OF URINE CHLORIDE: CPT | Performed by: INTERNAL MEDICINE

## 2020-04-30 PROCEDURE — 36415 COLL VENOUS BLD VENIPUNCTURE: CPT | Performed by: HOSPITALIST

## 2020-04-30 PROCEDURE — 84132 ASSAY OF SERUM POTASSIUM: CPT | Performed by: INTERNAL MEDICINE

## 2020-04-30 PROCEDURE — 80048 BASIC METABOLIC PNL TOTAL CA: CPT | Performed by: HOSPITALIST

## 2020-04-30 PROCEDURE — 25010000002 MAGNESIUM SULFATE IN D5W 1G/100ML (PREMIX) 1-5 GM/100ML-% SOLUTION: Performed by: HOSPITALIST

## 2020-04-30 RX ORDER — FAMOTIDINE 20 MG/1
20 TABLET, FILM COATED ORAL 2 TIMES DAILY
Status: DISCONTINUED | OUTPATIENT
Start: 2020-05-01 | End: 2020-05-01

## 2020-04-30 RX ORDER — MONTELUKAST SODIUM 10 MG/1
10 TABLET ORAL EVERY MORNING
Status: DISCONTINUED | OUTPATIENT
Start: 2020-05-01 | End: 2020-05-03 | Stop reason: HOSPADM

## 2020-04-30 RX ORDER — LEVOTHYROXINE SODIUM 88 UG/1
88 TABLET ORAL
Status: DISCONTINUED | OUTPATIENT
Start: 2020-05-01 | End: 2020-05-03 | Stop reason: HOSPADM

## 2020-04-30 RX ORDER — POTASSIUM CHLORIDE 1.5 G/1.77G
40 POWDER, FOR SOLUTION ORAL AS NEEDED
Status: DISCONTINUED | OUTPATIENT
Start: 2020-04-30 | End: 2020-05-03 | Stop reason: HOSPADM

## 2020-04-30 RX ORDER — POTASSIUM CHLORIDE 750 MG/1
40 CAPSULE, EXTENDED RELEASE ORAL AS NEEDED
Status: DISCONTINUED | OUTPATIENT
Start: 2020-04-30 | End: 2020-05-03 | Stop reason: HOSPADM

## 2020-04-30 RX ORDER — POTASSIUM CHLORIDE 750 MG/1
40 CAPSULE, EXTENDED RELEASE ORAL ONCE
Status: COMPLETED | OUTPATIENT
Start: 2020-04-30 | End: 2020-04-30

## 2020-04-30 RX ORDER — BUPROPION HYDROCHLORIDE 300 MG/1
300 TABLET ORAL DAILY
Status: DISCONTINUED | OUTPATIENT
Start: 2020-05-01 | End: 2020-05-03 | Stop reason: HOSPADM

## 2020-04-30 RX ORDER — NEBIVOLOL 10 MG/1
40 TABLET ORAL DAILY
Status: DISCONTINUED | OUTPATIENT
Start: 2020-05-01 | End: 2020-05-03 | Stop reason: HOSPADM

## 2020-04-30 RX ORDER — POTASSIUM CHLORIDE 7.45 MG/ML
10 INJECTION INTRAVENOUS
Status: DISCONTINUED | OUTPATIENT
Start: 2020-04-30 | End: 2020-05-03 | Stop reason: HOSPADM

## 2020-04-30 RX ORDER — ALPRAZOLAM 0.25 MG/1
0.25 TABLET ORAL 2 TIMES DAILY PRN
Status: DISCONTINUED | OUTPATIENT
Start: 2020-04-30 | End: 2020-05-03 | Stop reason: HOSPADM

## 2020-04-30 RX ORDER — ROSUVASTATIN CALCIUM 40 MG/1
40 TABLET, COATED ORAL DAILY
Status: DISCONTINUED | OUTPATIENT
Start: 2020-05-01 | End: 2020-05-03 | Stop reason: HOSPADM

## 2020-04-30 RX ADMIN — POTASSIUM CHLORIDE 40 MEQ: 10 CAPSULE, COATED, EXTENDED RELEASE ORAL at 17:05

## 2020-04-30 RX ADMIN — SODIUM CHLORIDE 100 ML/HR: 9 INJECTION, SOLUTION INTRAVENOUS at 08:30

## 2020-04-30 RX ADMIN — POTASSIUM CHLORIDE 40 MEQ: 10 CAPSULE, COATED, EXTENDED RELEASE ORAL at 03:04

## 2020-04-30 RX ADMIN — POTASSIUM CHLORIDE 40 MEQ: 10 CAPSULE, COATED, EXTENDED RELEASE ORAL at 13:02

## 2020-04-30 RX ADMIN — ALPRAZOLAM 0.25 MG: 0.25 TABLET ORAL at 14:12

## 2020-04-30 RX ADMIN — POTASSIUM CHLORIDE 40 MEQ: 10 CAPSULE, COATED, EXTENDED RELEASE ORAL at 09:08

## 2020-04-30 RX ADMIN — BISACODYL 5 MG: 5 TABLET ORAL at 17:16

## 2020-04-30 RX ADMIN — ONDANSETRON 4 MG: 2 INJECTION INTRAMUSCULAR; INTRAVENOUS at 11:20

## 2020-04-30 RX ADMIN — SODIUM CHLORIDE 100 ML/HR: 9 INJECTION, SOLUTION INTRAVENOUS at 17:08

## 2020-04-30 RX ADMIN — ONDANSETRON 4 MG: 2 INJECTION INTRAMUSCULAR; INTRAVENOUS at 17:16

## 2020-04-30 RX ADMIN — MAGNESIUM SULFATE HEPTAHYDRATE 1 G: 1 INJECTION, SOLUTION INTRAVENOUS at 03:04

## 2020-04-30 RX ADMIN — ONDANSETRON 4 MG: 2 INJECTION INTRAMUSCULAR; INTRAVENOUS at 03:21

## 2020-05-01 ENCOUNTER — ANESTHESIA EVENT (OUTPATIENT)
Dept: GASTROENTEROLOGY | Facility: HOSPITAL | Age: 65
End: 2020-05-01

## 2020-05-01 ENCOUNTER — ANESTHESIA (OUTPATIENT)
Dept: GASTROENTEROLOGY | Facility: HOSPITAL | Age: 65
End: 2020-05-01

## 2020-05-01 PROBLEM — R63.0 LOSS OF APPETITE: Status: ACTIVE | Noted: 2020-04-29

## 2020-05-01 PROBLEM — R11.0 NAUSEA: Status: ACTIVE | Noted: 2020-04-29

## 2020-05-01 LAB
ANION GAP SERPL CALCULATED.3IONS-SCNC: 10.8 MMOL/L (ref 5–15)
BUN BLD-MCNC: 7 MG/DL (ref 8–23)
BUN/CREAT SERPL: 9.7 (ref 7–25)
CALCIUM SPEC-SCNC: 8.2 MG/DL (ref 8.6–10.5)
CHLORIDE SERPL-SCNC: 99 MMOL/L (ref 98–107)
CO2 SERPL-SCNC: 24.2 MMOL/L (ref 22–29)
CORTIS SERPL-MCNC: 2.86 MCG/DL
CREAT BLD-MCNC: 0.72 MG/DL (ref 0.57–1)
DEPRECATED RDW RBC AUTO: 43.5 FL (ref 37–54)
ERYTHROCYTE [DISTWIDTH] IN BLOOD BY AUTOMATED COUNT: 13.2 % (ref 12.3–15.4)
GFR SERPL CREATININE-BSD FRML MDRD: 81 ML/MIN/1.73
GLUCOSE BLD-MCNC: 86 MG/DL (ref 65–99)
HCT VFR BLD AUTO: 36.1 % (ref 34–46.6)
HGB BLD-MCNC: 12.9 G/DL (ref 12–15.9)
MAGNESIUM SERPL-MCNC: 1.8 MG/DL (ref 1.6–2.4)
MCH RBC QN AUTO: 32.4 PG (ref 26.6–33)
MCHC RBC AUTO-ENTMCNC: 35.7 G/DL (ref 31.5–35.7)
MCV RBC AUTO: 90.7 FL (ref 79–97)
PLATELET # BLD AUTO: 192 10*3/MM3 (ref 140–450)
PMV BLD AUTO: 9.6 FL (ref 6–12)
POTASSIUM BLD-SCNC: 3.4 MMOL/L (ref 3.5–5.2)
POTASSIUM BLD-SCNC: 4.2 MMOL/L (ref 3.5–5.2)
RBC # BLD AUTO: 3.98 10*6/MM3 (ref 3.77–5.28)
SODIUM BLD-SCNC: 134 MMOL/L (ref 136–145)
TSH SERPL DL<=0.05 MIU/L-ACNC: 1.36 UIU/ML (ref 0.27–4.2)
WBC NRBC COR # BLD: 5.45 10*3/MM3 (ref 3.4–10.8)

## 2020-05-01 PROCEDURE — 0DB68ZX EXCISION OF STOMACH, VIA NATURAL OR ARTIFICIAL OPENING ENDOSCOPIC, DIAGNOSTIC: ICD-10-PCS | Performed by: INTERNAL MEDICINE

## 2020-05-01 PROCEDURE — 84132 ASSAY OF SERUM POTASSIUM: CPT | Performed by: INTERNAL MEDICINE

## 2020-05-01 PROCEDURE — 82533 TOTAL CORTISOL: CPT | Performed by: INTERNAL MEDICINE

## 2020-05-01 PROCEDURE — 25010000002 PROPOFOL 10 MG/ML EMULSION: Performed by: ANESTHESIOLOGY

## 2020-05-01 PROCEDURE — 83735 ASSAY OF MAGNESIUM: CPT | Performed by: INTERNAL MEDICINE

## 2020-05-01 PROCEDURE — 25010000003 POTASSIUM CHLORIDE 10 MEQ/100ML SOLUTION: Performed by: INTERNAL MEDICINE

## 2020-05-01 PROCEDURE — 63710000001 ONDANSETRON PER 8 MG: Performed by: INTERNAL MEDICINE

## 2020-05-01 PROCEDURE — 88305 TISSUE EXAM BY PATHOLOGIST: CPT | Performed by: INTERNAL MEDICINE

## 2020-05-01 PROCEDURE — 0DB98ZX EXCISION OF DUODENUM, VIA NATURAL OR ARTIFICIAL OPENING ENDOSCOPIC, DIAGNOSTIC: ICD-10-PCS | Performed by: INTERNAL MEDICINE

## 2020-05-01 PROCEDURE — 99232 SBSQ HOSP IP/OBS MODERATE 35: CPT | Performed by: INTERNAL MEDICINE

## 2020-05-01 PROCEDURE — 80048 BASIC METABOLIC PNL TOTAL CA: CPT | Performed by: INTERNAL MEDICINE

## 2020-05-01 PROCEDURE — 85027 COMPLETE CBC AUTOMATED: CPT | Performed by: INTERNAL MEDICINE

## 2020-05-01 PROCEDURE — 84443 ASSAY THYROID STIM HORMONE: CPT | Performed by: INTERNAL MEDICINE

## 2020-05-01 PROCEDURE — 43239 EGD BIOPSY SINGLE/MULTIPLE: CPT | Performed by: INTERNAL MEDICINE

## 2020-05-01 RX ORDER — PROPOFOL 10 MG/ML
VIAL (ML) INTRAVENOUS AS NEEDED
Status: DISCONTINUED | OUTPATIENT
Start: 2020-05-01 | End: 2020-05-01 | Stop reason: SURG

## 2020-05-01 RX ORDER — PROPOFOL 10 MG/ML
VIAL (ML) INTRAVENOUS CONTINUOUS PRN
Status: DISCONTINUED | OUTPATIENT
Start: 2020-05-01 | End: 2020-05-01 | Stop reason: SURG

## 2020-05-01 RX ORDER — LIDOCAINE HYDROCHLORIDE 20 MG/ML
INJECTION, SOLUTION INFILTRATION; PERINEURAL AS NEEDED
Status: DISCONTINUED | OUTPATIENT
Start: 2020-05-01 | End: 2020-05-01 | Stop reason: SURG

## 2020-05-01 RX ORDER — SODIUM CHLORIDE 9 MG/ML
30 INJECTION, SOLUTION INTRAVENOUS CONTINUOUS PRN
Status: DISCONTINUED | OUTPATIENT
Start: 2020-05-01 | End: 2020-05-01

## 2020-05-01 RX ORDER — SUCRALFATE ORAL 1 G/10ML
1 SUSPENSION ORAL
Status: DISCONTINUED | OUTPATIENT
Start: 2020-05-01 | End: 2020-05-03 | Stop reason: HOSPADM

## 2020-05-01 RX ORDER — SODIUM CHLORIDE 9 MG/ML
100 INJECTION, SOLUTION INTRAVENOUS CONTINUOUS
Status: ACTIVE | OUTPATIENT
Start: 2020-05-01 | End: 2020-05-01

## 2020-05-01 RX ORDER — LOSARTAN POTASSIUM 25 MG/1
25 TABLET ORAL
Status: DISCONTINUED | OUTPATIENT
Start: 2020-05-01 | End: 2020-05-03 | Stop reason: HOSPADM

## 2020-05-01 RX ORDER — PANTOPRAZOLE SODIUM 40 MG/1
40 TABLET, DELAYED RELEASE ORAL
Status: DISCONTINUED | OUTPATIENT
Start: 2020-05-01 | End: 2020-05-03 | Stop reason: HOSPADM

## 2020-05-01 RX ADMIN — MONTELUKAST SODIUM 10 MG: 10 TABLET, FILM COATED ORAL at 06:11

## 2020-05-01 RX ADMIN — ONDANSETRON HYDROCHLORIDE 4 MG: 4 TABLET, FILM COATED ORAL at 12:19

## 2020-05-01 RX ADMIN — POTASSIUM CHLORIDE 40 MEQ: 10 CAPSULE, COATED, EXTENDED RELEASE ORAL at 17:12

## 2020-05-01 RX ADMIN — SODIUM CHLORIDE 100 ML/HR: 9 INJECTION, SOLUTION INTRAVENOUS at 12:17

## 2020-05-01 RX ADMIN — SODIUM CHLORIDE 30 ML/HR: 9 INJECTION, SOLUTION INTRAVENOUS at 10:12

## 2020-05-01 RX ADMIN — PROPOFOL 100 MG: 10 INJECTION, EMULSION INTRAVENOUS at 10:28

## 2020-05-01 RX ADMIN — SODIUM CHLORIDE 100 ML/HR: 9 INJECTION, SOLUTION INTRAVENOUS at 17:12

## 2020-05-01 RX ADMIN — SUCRALFATE 1 G: 1 SUSPENSION ORAL at 13:20

## 2020-05-01 RX ADMIN — PANTOPRAZOLE SODIUM 40 MG: 40 TABLET, DELAYED RELEASE ORAL at 17:12

## 2020-05-01 RX ADMIN — POTASSIUM CHLORIDE 40 MEQ: 10 CAPSULE, COATED, EXTENDED RELEASE ORAL at 13:20

## 2020-05-01 RX ADMIN — LEVOTHYROXINE SODIUM 88 MCG: 88 TABLET ORAL at 06:11

## 2020-05-01 RX ADMIN — SUCRALFATE 1 G: 1 SUSPENSION ORAL at 17:12

## 2020-05-01 RX ADMIN — ALPRAZOLAM 0.25 MG: 0.25 TABLET ORAL at 02:01

## 2020-05-01 RX ADMIN — PROPOFOL 100 MCG/KG/MIN: 10 INJECTION, EMULSION INTRAVENOUS at 10:28

## 2020-05-01 RX ADMIN — LIDOCAINE HYDROCHLORIDE 60 MG: 20 INJECTION, SOLUTION INFILTRATION; PERINEURAL at 10:28

## 2020-05-01 RX ADMIN — POTASSIUM CHLORIDE 10 MEQ: 7.46 INJECTION, SOLUTION INTRAVENOUS at 08:06

## 2020-05-01 RX ADMIN — SODIUM CHLORIDE, PRESERVATIVE FREE 10 ML: 5 INJECTION INTRAVENOUS at 20:33

## 2020-05-01 RX ADMIN — SUCRALFATE 1 G: 1 SUSPENSION ORAL at 20:33

## 2020-05-01 NOTE — ANESTHESIA PREPROCEDURE EVALUATION
Anesthesia Evaluation     Patient summary reviewed and Nursing notes reviewed                Airway   Mallampati: I  TM distance: >3 FB  Neck ROM: full  No difficulty expected  Dental - normal exam     Pulmonary - normal exam   (+) sleep apnea,   Cardiovascular - normal exam    (+) hypertension, hyperlipidemia,       Neuro/Psych- negative ROS  GI/Hepatic/Renal/Endo    (+)  GERD,  liver disease, diabetes mellitus,     Musculoskeletal     Abdominal  - normal exam    Bowel sounds: normal.   Substance History - negative use     OB/GYN negative ob/gyn ROS         Other   arthritis,                      Anesthesia Plan    ASA 3     MAC       Anesthetic plan, all risks, benefits, and alternatives have been provided, discussed and informed consent has been obtained with: patient.

## 2020-05-01 NOTE — PROGRESS NOTES
Continued Stay Note  [facility]     Patient Name: Kelle Soliz  MRN: [id number]  Today's Date: 5/1/2020    Admit Date: 4/29/2020    Discharge Plan     Row Name 05/01/20 1339       Plan    Plan  Plans home; denies needs.      Plan Comments  Spoke to the patient at bedside who confirmed her plan to return home upon d/c and denies any d/c needs.  CCP will follow to assist with any d/c needs that may arise.  JAVIER Canela        Discharge Codes    No documentation.             JAVIER Carter

## 2020-05-01 NOTE — ANESTHESIA POSTPROCEDURE EVALUATION
"Patient: Kelle Soliz    Procedure Summary     Date:  05/01/20 Room / Location:  [facility] ENDOSCOPY 4 / [facility] ENDOSCOPY    Anesthesia Start:  1026 Anesthesia Stop:  1043    Procedure:  ESOPHAGOGASTRODUODENOSCOPYWITH COLD BIOPSIES (N/A Esophagus) Diagnosis:       Nausea      Loss of appetite      (Nausea [R11.0])      (Loss of appetite [R63.0])    Surgeon:  Kirby Dee MD Provider:  Tarik Duarte MD    Anesthesia Type:  MAC ASA Status:  3          Anesthesia Type: MAC    Vitals  Vitals Value Taken Time   /71 5/1/2020 11:02 AM   Temp     Pulse 62 5/1/2020 11:02 AM   Resp 16 5/1/2020 11:02 AM   SpO2 95 % 5/1/2020 11:02 AM           Post Anesthesia Care and Evaluation    Patient location during evaluation: PACU  Patient participation: complete - patient participated  Level of consciousness: awake  Pain score: 0  Pain management: adequate  Airway patency: patent  Anesthetic complications: No anesthetic complications  PONV Status: none  Cardiovascular status: acceptable  Respiratory status: acceptable  Hydration status: acceptable    Comments: /71 (BP Location: Left arm, Patient Position: Lying)   Pulse 62   Temp 36.7 °C (98.1 °F) (Oral)   Resp 16   Ht 157.5 cm (62.01\")   Wt 91.7 kg (202 lb 1.6 oz)   SpO2 95%   BMI 36.96 kg/m²       "

## 2020-05-01 NOTE — PLAN OF CARE
replaced K today PO x2. gave PO zofran before meals. EGD was done showing gastritis, carafate and protonix added to mar. IVF going. pt was able to get up and shower today. fluid restriction has drank 600ML. daily weight. updated daughter-in-law who plans to update rest of the family. no complaints at this time will continue to monitor.

## 2020-05-01 NOTE — PLAN OF CARE
Problem: Patient Care Overview  Goal: Plan of Care Review  Outcome: Ongoing (interventions implemented as appropriate)  Note:   Pt had uneventful night. Potassium up to 4.0 with 9 pm redraw and pt given xanax once during shift. No other complaints.

## 2020-05-01 NOTE — PROGRESS NOTES
[facility]  Inpatient Progress Note    Reason for Follow Up:  Anorexia, nausea    Subjective     Interval History:   Some improvement in symptoms.  At a little dinner, no vomiting.      Current Facility-Administered Medications:   •  acetaminophen (TYLENOL) tablet 650 mg, 650 mg, Oral, Q4H PRN **OR** acetaminophen (TYLENOL) 160 MG/5ML solution 650 mg, 650 mg, Oral, Q4H PRN **OR** acetaminophen (TYLENOL) suppository 650 mg, 650 mg, Rectal, Q4H PRN, Jayde Parker MD  •  ALPRAZolam (XANAX) tablet 0.25 mg, 0.25 mg, Oral, BID PRN, Jayde Parker MD, 0.25 mg at 05/01/20 0201  •  bisacodyl (DULCOLAX) EC tablet 5 mg, 5 mg, Oral, Daily PRN, Jayde Parker MD, 5 mg at 04/30/20 1716  •  bisacodyl (DULCOLAX) suppository 10 mg, 10 mg, Rectal, Daily PRN, Jayde Parker MD  •  buPROPion XL (WELLBUTRIN XL) 24 hr tablet 300 mg, 300 mg, Oral, Daily, Jayde Parker MD  •  famotidine (PEPCID) tablet 20 mg, 20 mg, Oral, BID, Jayde Parker MD  •  levothyroxine (SYNTHROID, LEVOTHROID) tablet 88 mcg, 88 mcg, Oral, Q AM, Jayde Parker MD, 88 mcg at 05/01/20 0611  •  montelukast (SINGULAIR) tablet 10 mg, 10 mg, Oral, QAM, Jayde Parker MD, 10 mg at 05/01/20 0611  •  nebivolol (BYSTOLIC) tablet 40 mg, 40 mg, Oral, Daily, Jayde Parker MD  •  ondansetron (ZOFRAN) tablet 4 mg, 4 mg, Oral, Q6H PRN **OR** ondansetron (ZOFRAN) injection 4 mg, 4 mg, Intravenous, Q6H PRN, Jayde Parker MD, 4 mg at 04/30/20 1716  •  potassium chloride (MICRO-K) CR capsule 40 mEq, 40 mEq, Oral, PRN, 40 mEq at 04/30/20 1705 **OR** potassium chloride (KLOR-CON) packet 40 mEq, 40 mEq, Oral, PRN **OR** potassium chloride 10 mEq in 100 mL IVPB, 10 mEq, Intravenous, Q1H PRN, Jayde Parker MD, Last Rate: 100 mL/hr at 05/01/20 0806, 10 mEq at 05/01/20 0806  •  rosuvastatin (CRESTOR) tablet 40 mg, 40 mg, Oral, Daily, Jayde Parker MD  •  [COMPLETED]  Insert peripheral IV, , , Once **AND** sodium chloride 0.9 % flush 10 mL, 10 mL, Intravenous, PRN, Jayde Parker MD  •  sodium chloride 0.9 % flush 10 mL, 10 mL, Intravenous, Q12H, Jayde Parker MD  •  sodium chloride 0.9 % flush 10 mL, 10 mL, Intravenous, PRN, Jayde Parker MD  •  sodium chloride 0.9 % infusion, 100 mL/hr, Intravenous, Continuous, Jayde Parker MD, Last Rate: 100 mL/hr at 04/30/20 1708, 100 mL/hr at 04/30/20 1708  Review of Systems:    The following systems were reviewed and negative;  gastrointestinal    Objective     Vital Signs  Temp:  [96.7 °F (35.9 °C)-97.7 °F (36.5 °C)] 96.8 °F (36 °C)  Heart Rate:  [62-68] 66  Resp:  [16-18] 18  BP: (109-130)/(67-79) 128/67  Body mass index is 36.96 kg/m².    Intake/Output Summary (Last 24 hours) at 5/1/2020 0818  Last data filed at 5/1/2020 0103  Gross per 24 hour   Intake 1611.66 ml   Output 2100 ml   Net -488.34 ml     No intake/output data recorded.     Physical Exam:   General: patient awake, alert and cooperative   Abdomen: soft, nontender, nondistended; normal bowel sounds   Rectal: deferred   Extremities: no rash or edema   Psychiatric: Normal mood and behavior; memory intact     Results Review:     I reviewed the patient's new clinical results.    Results from last 7 days   Lab Units 05/01/20 0555 04/30/20 0437 04/29/20 2129   WBC 10*3/mm3 5.45 7.49 8.14   HEMOGLOBIN g/dL 12.9 13.6 14.2   HEMATOCRIT % 36.1 38.1 39.4   PLATELETS 10*3/mm3 192 207 228     Results from last 7 days   Lab Units 05/01/20  0555 04/30/20 2118 04/30/20 0437 04/29/20 2129 04/25/20  0038   SODIUM mmol/L 134*  --  125* 124* 124*   POTASSIUM mmol/L 3.4* 4.0 2.9* 3.3* 3.1*   CHLORIDE mmol/L 99  --  86* 82* 85*   CO2 mmol/L 24.2  --  26.4 28.2 26.1   BUN mg/dL 7*  --  9 13 10   CREATININE mg/dL 0.72  --  0.95 1.13* 1.00   CALCIUM mg/dL 8.2*  --  8.6 9.3 9.3   BILIRUBIN mg/dL  --   --   --  0.5 0.7   ALK PHOS U/L  --   --   --  68 76   ALT  (SGPT) U/L  --   --   --  32 26   AST (SGOT) U/L  --   --   --  28 20   GLUCOSE mg/dL 86  --  102* 113* 105*         Lab Results   Lab Value Date/Time    LIPASE 41 04/29/2020 2129    LIPASE 34 04/25/2020 0038    LIPASE 33 04/17/2020 0313    LIPASE 33 04/14/2020 0305         Assessment/Plan   Assessment:   1.  Anorexia  2.  Nausea  3.  Weight loss  4. Hyponatremia  5.  Hypokalemia    Plan:   Overall improvement in electrolytes.  I think we can proceed with EGD today.  Will place on endo schedule for early afternoon.  NPO for now.      I discussed the patients findings and my recommendations with patient.         Kirby Dee M.D.  [facility]  [address] [place] [address]  Office: [phone]

## 2020-05-01 NOTE — PROGRESS NOTES
"DAILY PROGRESS NOTE  [facility]    Patient Identification:  Name: Kelle Soliz  Age: 65 y.o.  Sex: female  :  1955  MRN: [id number]         Primary Care Physician: Ame Dempsey MD    Subjective: patient is much better today; less nauseated  Interval History: follow up for hyponatremia, nausea, hypothyroidism    Objective:    Scheduled Meds:  buPROPion  mg Oral Daily   levothyroxine 88 mcg Oral Q AM   losartan 25 mg Oral Q24H   montelukast 10 mg Oral QAM   nebivolol 40 mg Oral Daily   pantoprazole 40 mg Oral BID AC   rosuvastatin 40 mg Oral Daily   sodium chloride 10 mL Intravenous Q12H   sucralfate 1 g Oral 4x Daily AC & at Bedtime     Continuous Infusions:  sodium chloride 100 mL/hr Last Rate: 100 mL/hr (20 1217)       Vital signs in last 24 hours:  Temp:  [96.7 °F (35.9 °C)-98.1 °F (36.7 °C)] 97.1 °F (36.2 °C)  Heart Rate:  [60-70] 60  Resp:  [16-22] 18  BP: ()/(63-83) 131/75    Intake/Output:    Intake/Output Summary (Last 24 hours) at 2020 1425  Last data filed at 2020 1116  Gross per 24 hour   Intake 1263.33 ml   Output 1800 ml   Net -536.67 ml       Exam:  /75 (BP Location: Left arm, Patient Position: Lying)   Pulse 60   Temp 97.1 °F (36.2 °C) (Oral)   Resp 18   Ht 157.5 cm (62.01\")   Wt 91.7 kg (202 lb 1.6 oz)   SpO2 98%   BMI 36.96 kg/m²     General Appearance:    Alert, cooperative, no distress, AAOx3                          Head:    Normocephalic, without obvious abnormality, atraumatic                           Eyes:    PERRL, conjunctiva/corneas clear, EOM's intact, both eyes                         Throat:   Lips, tongue, gums normal; oral mucosa pink and moist                           Neck:   Supple, symmetrical, trachea midline, no JVD                         Lungs:    Decreased breath sounds bilaterally, respirations unlabored                 Chest Wall:    No tenderness or deformity                          Heart:    Regular rate " and rhythm, S1 and S2 normal, no murmur,no  Rub                                      or gallop                  Abdomen:     Soft, non-tender, bowel sounds active, no masses, no                                                        organomegaly                  Extremities:   Extremities normal, atraumatic, no cyanosis or edema                        Pulses:   Pulses palpable in all extremities                            Skin:   Skin is warm and dry,  no rashes or palpable lesions                  Neurologic:   CNII-XII intact, motor strength grossly intact, sensation grossly                                         intact to light touch, no focal deficits noted       Data Review:  Labs in chart were reviewed.  WBC   Date Value Ref Range Status   05/01/2020 5.45 3.40 - 10.80 10*3/mm3 Final     Hemoglobin   Date Value Ref Range Status   05/01/2020 12.9 12.0 - 15.9 g/dL Final     Hematocrit   Date Value Ref Range Status   05/01/2020 36.1 34.0 - 46.6 % Final     Platelets   Date Value Ref Range Status   05/01/2020 192 140 - 450 10*3/mm3 Final     Sodium   Date Value Ref Range Status   05/01/2020 134 (L) 136 - 145 mmol/L Final     Potassium   Date Value Ref Range Status   05/01/2020 3.4 (L) 3.5 - 5.2 mmol/L Final     Chloride   Date Value Ref Range Status   05/01/2020 99 98 - 107 mmol/L Final     CO2   Date Value Ref Range Status   05/01/2020 24.2 22.0 - 29.0 mmol/L Final     BUN   Date Value Ref Range Status   05/01/2020 7 (L) 8 - 23 mg/dL Final     Creatinine   Date Value Ref Range Status   05/01/2020 0.72 0.57 - 1.00 mg/dL Final     Glucose   Date Value Ref Range Status   05/01/2020 86 65 - 99 mg/dL Final     Calcium   Date Value Ref Range Status   05/01/2020 8.2 (L) 8.6 - 10.5 mg/dL Final     Magnesium   Date Value Ref Range Status   05/01/2020 1.8 1.6 - 2.4 mg/dL Final     Phosphorus   Date Value Ref Range Status   04/30/2020 3.1 2.5 - 4.5 mg/dL Final     AST (SGOT)   Date Value Ref Range Status   04/29/2020 28 1 -  32 U/L Final     Comment:     Specimen hemolyzed.  Results may be affected.     ALT (SGPT)   Date Value Ref Range Status   04/29/2020 32 1 - 33 U/L Final     Alkaline Phosphatase   Date Value Ref Range Status   04/29/2020 68 39 - 117 U/L Final     No results found for: APTT, INR  Patient Active Problem List   Diagnosis Code   • Hypersomnia with sleep apnea G47.10, G47.30   • Snoring R06.83   • Hx of colonic polyps Z86.010   • Chest pain R07.9   • DJD (degenerative joint disease) of knee M17.10   • Hyponatremia E87.1   • Hypokalemia E87.6   • GERD without esophagitis K21.9   • DELIA (obstructive sleep apnea) G47.33   • Nausea R11.0   • Loss of appetite R63.0       Assessment:    DJD (degenerative joint disease) of knee    Hyponatremia    Hypokalemia    GERD without esophagitis    DELIA (obstructive sleep apnea)    Nausea    Loss of appetite      Plan:  Will continue to monitor  Recheck labs in the am  Replace potassium   Appreciate help from all  She has improved a lot from yesterday  Stop hctz as recommended  Medium risk    Jayde Parker MD  5/1/2020  14:25

## 2020-05-02 LAB
ANION GAP SERPL CALCULATED.3IONS-SCNC: 9.9 MMOL/L (ref 5–15)
BUN BLD-MCNC: 7 MG/DL (ref 8–23)
BUN/CREAT SERPL: 8.2 (ref 7–25)
CALCIUM SPEC-SCNC: 8.5 MG/DL (ref 8.6–10.5)
CHLORIDE SERPL-SCNC: 102 MMOL/L (ref 98–107)
CO2 SERPL-SCNC: 24.1 MMOL/L (ref 22–29)
CREAT BLD-MCNC: 0.85 MG/DL (ref 0.57–1)
DEPRECATED RDW RBC AUTO: 43.4 FL (ref 37–54)
ERYTHROCYTE [DISTWIDTH] IN BLOOD BY AUTOMATED COUNT: 13.2 % (ref 12.3–15.4)
GFR SERPL CREATININE-BSD FRML MDRD: 67 ML/MIN/1.73
GLUCOSE BLD-MCNC: 84 MG/DL (ref 65–99)
GLUCOSE BLDC GLUCOMTR-MCNC: 208 MG/DL (ref 70–130)
HCT VFR BLD AUTO: 35.3 % (ref 34–46.6)
HGB BLD-MCNC: 12.4 G/DL (ref 12–15.9)
MAGNESIUM SERPL-MCNC: 1.8 MG/DL (ref 1.6–2.4)
MCH RBC QN AUTO: 32 PG (ref 26.6–33)
MCHC RBC AUTO-ENTMCNC: 35.1 G/DL (ref 31.5–35.7)
MCV RBC AUTO: 91 FL (ref 79–97)
PLATELET # BLD AUTO: 194 10*3/MM3 (ref 140–450)
PMV BLD AUTO: 9.4 FL (ref 6–12)
POTASSIUM BLD-SCNC: 3.9 MMOL/L (ref 3.5–5.2)
RBC # BLD AUTO: 3.88 10*6/MM3 (ref 3.77–5.28)
SODIUM BLD-SCNC: 136 MMOL/L (ref 136–145)
WBC NRBC COR # BLD: 5.71 10*3/MM3 (ref 3.4–10.8)

## 2020-05-02 PROCEDURE — 80048 BASIC METABOLIC PNL TOTAL CA: CPT | Performed by: INTERNAL MEDICINE

## 2020-05-02 PROCEDURE — 82962 GLUCOSE BLOOD TEST: CPT

## 2020-05-02 PROCEDURE — 85027 COMPLETE CBC AUTOMATED: CPT | Performed by: INTERNAL MEDICINE

## 2020-05-02 PROCEDURE — 83735 ASSAY OF MAGNESIUM: CPT | Performed by: INTERNAL MEDICINE

## 2020-05-02 PROCEDURE — 99232 SBSQ HOSP IP/OBS MODERATE 35: CPT | Performed by: INTERNAL MEDICINE

## 2020-05-02 RX ADMIN — SUCRALFATE 1 G: 1 SUSPENSION ORAL at 20:20

## 2020-05-02 RX ADMIN — LOSARTAN POTASSIUM 25 MG: 25 TABLET, FILM COATED ORAL at 08:24

## 2020-05-02 RX ADMIN — LEVOTHYROXINE SODIUM 88 MCG: 88 TABLET ORAL at 06:00

## 2020-05-02 RX ADMIN — ROSUVASTATIN CALCIUM 40 MG: 40 TABLET, FILM COATED ORAL at 08:25

## 2020-05-02 RX ADMIN — SUCRALFATE 1 G: 1 SUSPENSION ORAL at 11:07

## 2020-05-02 RX ADMIN — PANTOPRAZOLE SODIUM 40 MG: 40 TABLET, DELAYED RELEASE ORAL at 07:29

## 2020-05-02 RX ADMIN — PANTOPRAZOLE SODIUM 40 MG: 40 TABLET, DELAYED RELEASE ORAL at 16:39

## 2020-05-02 RX ADMIN — SUCRALFATE 1 G: 1 SUSPENSION ORAL at 07:29

## 2020-05-02 RX ADMIN — BUPROPION HYDROCHLORIDE 300 MG: 300 TABLET, EXTENDED RELEASE ORAL at 08:25

## 2020-05-02 RX ADMIN — SUCRALFATE 1 G: 1 SUSPENSION ORAL at 16:39

## 2020-05-02 RX ADMIN — SODIUM CHLORIDE, PRESERVATIVE FREE 10 ML: 5 INJECTION INTRAVENOUS at 08:25

## 2020-05-02 RX ADMIN — NEBIVOLOL HYDROCHLORIDE 40 MG: 10 TABLET ORAL at 08:25

## 2020-05-02 RX ADMIN — MONTELUKAST SODIUM 10 MG: 10 TABLET, FILM COATED ORAL at 06:01

## 2020-05-02 RX ADMIN — SODIUM CHLORIDE, PRESERVATIVE FREE 10 ML: 5 INJECTION INTRAVENOUS at 20:20

## 2020-05-02 NOTE — PROGRESS NOTES
"DAILY PROGRESS NOTE  [facility]    Patient Identification:  Name: Kelle Soliz  Age: 65 y.o.  Sex: female  :  1955  MRN: [id number]         Primary Care Physician: Ame Dempsey MD    Subjective: patient is feeling better; had some nausea after eating breakfast but less now  Interval History: follow up nausea, hyponatremia, obesity, hypertension     Objective:    Scheduled Meds:  buPROPion  mg Oral Daily   levothyroxine 88 mcg Oral Q AM   losartan 25 mg Oral Q24H   montelukast 10 mg Oral QAM   nebivolol 40 mg Oral Daily   pantoprazole 40 mg Oral BID AC   rosuvastatin 40 mg Oral Daily   sodium chloride 10 mL Intravenous Q12H   sucralfate 1 g Oral 4x Daily AC & at Bedtime     Continuous Infusions:     Vital signs in last 24 hours:  Temp:  [96.2 °F (35.7 °C)-97.9 °F (36.6 °C)] 97.1 °F (36.2 °C)  Heart Rate:  [64-69] 64  Resp:  [16-18] 18  BP: (106-142)/(66-80) 142/80    Intake/Output:    Intake/Output Summary (Last 24 hours) at 2020 1936  Last data filed at 2020 0409  Gross per 24 hour   Intake 1200 ml   Output 1200 ml   Net 0 ml       Exam:  /80 (BP Location: Right arm, Patient Position: Lying)   Pulse 64   Temp 97.1 °F (36.2 °C) (Oral)   Resp 18   Ht 157.5 cm (62.01\")   Wt 96.6 kg (212 lb 15.4 oz)   SpO2 96%   BMI 38.94 kg/m²     General Appearance:    Alert, cooperative, no distress, AAOx3                          Head:    Normocephalic, without obvious abnormality, atraumatic                           Eyes:    PERRL, conjunctiva/corneas clear, EOM's intact, both eyes                         Throat:   Lips, tongue, gums normal; oral mucosa pink and moist                           Neck:   Supple, symmetrical, trachea midline, no JVD                         Lungs:    Clear to auscultation bilaterally, respirations unlabored                 Chest Wall:    No tenderness or deformity                          Heart:    Regular rate and rhythm, S1 and S2 normal, no " murmur,no  Rub                                      or gallop                  Abdomen:     Soft, non-tender, bowel sounds active, no masses, no                                                        organomegaly                  Extremities:   Extremities normal, atraumatic, no cyanosis or edema                        Pulses:   Pulses palpable in all extremities                            Skin:   Skin is warm and dry,  no rashes or palpable lesions                  Neurologic:   CNII-XII intact, motor strength grossly intact, sensation grossly                                         intact to light touch, no focal deficits noted       Data Review:  Labs in chart were reviewed.  WBC   Date Value Ref Range Status   05/02/2020 5.71 3.40 - 10.80 10*3/mm3 Final     Hemoglobin   Date Value Ref Range Status   05/02/2020 12.4 12.0 - 15.9 g/dL Final     Hematocrit   Date Value Ref Range Status   05/02/2020 35.3 34.0 - 46.6 % Final     Platelets   Date Value Ref Range Status   05/02/2020 194 140 - 450 10*3/mm3 Final     Sodium   Date Value Ref Range Status   05/02/2020 136 136 - 145 mmol/L Final     Potassium   Date Value Ref Range Status   05/02/2020 3.9 3.5 - 5.2 mmol/L Final     Chloride   Date Value Ref Range Status   05/02/2020 102 98 - 107 mmol/L Final     CO2   Date Value Ref Range Status   05/02/2020 24.1 22.0 - 29.0 mmol/L Final     BUN   Date Value Ref Range Status   05/02/2020 7 (L) 8 - 23 mg/dL Final     Creatinine   Date Value Ref Range Status   05/02/2020 0.85 0.57 - 1.00 mg/dL Final     Glucose   Date Value Ref Range Status   05/02/2020 84 65 - 99 mg/dL Final     Calcium   Date Value Ref Range Status   05/02/2020 8.5 (L) 8.6 - 10.5 mg/dL Final     Magnesium   Date Value Ref Range Status   05/02/2020 1.8 1.6 - 2.4 mg/dL Final     Phosphorus   Date Value Ref Range Status   04/30/2020 3.1 2.5 - 4.5 mg/dL Final     AST (SGOT)   Date Value Ref Range Status   04/29/2020 28 1 - 32 U/L Final     Comment:     Specimen  hemolyzed.  Results may be affected.     ALT (SGPT)   Date Value Ref Range Status   04/29/2020 32 1 - 33 U/L Final     Alkaline Phosphatase   Date Value Ref Range Status   04/29/2020 68 39 - 117 U/L Final     No results found for: APTT, INR  Patient Active Problem List   Diagnosis Code   • Hypersomnia with sleep apnea G47.10, G47.30   • Snoring R06.83   • Hx of colonic polyps Z86.010   • Chest pain R07.9   • DJD (degenerative joint disease) of knee M17.10   • Hyponatremia E87.1   • Hypokalemia E87.6   • GERD without esophagitis K21.9   • DELIA (obstructive sleep apnea) G47.33   • Nausea R11.0   • Loss of appetite R63.0       Assessment:    DJD (degenerative joint disease) of knee    Hyponatremia    Hypokalemia    GERD without esophagitis    DELIA (obstructive sleep apnea)    Nausea    Loss of appetite      Plan:  Better after hctz was stopped  bp is controlled so ar  She can follow up with pcp or dr baker if adjustments need to be made  Check labs in am  No fluids restriction needed  Blood sugar was an error  Will check hgba1c and bmp  Medium risk    Jayde Parker MD  5/2/2020  19:36

## 2020-05-02 NOTE — PROGRESS NOTES
[facility]/[place]     Inpatient Follow Up Note    Patient Identification:  Name: Kelle Soliz  Age: 65 y.o.  Sex: female  :  1955  MRN: [id number]    Information from:patient     CC: N/V/abdominal pain.     History:   Patient is doing well presently.  She has no nausea or vomiting.  No abdominal pain.  Her serum sodium has been corrected to 136.  Gastric biopsies obtained yesterday are pending.  They will probably not be available until Tuesday.    Review of Systems:  Constitutional:  Negative   Cardiovascular:  Negative   Respiratory:  Negative             Problem List:  Patient Active Problem List    Diagnosis   • Hyponatremia [E87.1]   • Hypokalemia [E87.6]   • GERD without esophagitis [K21.9]   • DELIA (obstructive sleep apnea) [G47.33]   • Nausea [R11.0]   • Loss of appetite [R63.0]   • DJD (degenerative joint disease) of knee [M17.10]   • Chest pain [R07.9]   • Hx of colonic polyps [Z86.010]   • Hypersomnia with sleep apnea [G47.10, G47.30]   • Snoring [R06.83]     Current Meds:  MAR Reviewed  Scheduled Meds:  buPROPion  mg Oral Daily   levothyroxine 88 mcg Oral Q AM   losartan 25 mg Oral Q24H   montelukast 10 mg Oral QAM   nebivolol 40 mg Oral Daily   pantoprazole 40 mg Oral BID AC   rosuvastatin 40 mg Oral Daily   sodium chloride 10 mL Intravenous Q12H   sucralfate 1 g Oral 4x Daily AC & at Bedtime     Continuous Infusions:   PRN Meds:.•  acetaminophen **OR** acetaminophen **OR** acetaminophen  •  ALPRAZolam  •  bisacodyl  •  bisacodyl  •  ondansetron **OR** ondansetron  •  potassium chloride **OR** potassium chloride **OR** potassium chloride  •  [COMPLETED] Insert peripheral IV **AND** sodium chloride  •  sodium chloride  Allergies:  No Known Allergies    Intake/Output:     Intake/Output Summary (Last 24 hours) at 2020 1155  Last data filed at 2020 0409  Gross per 24 hour   Intake 2291.67 ml   Output 2300 ml   Net -8.33 ml     New  "allergies/reactions:  None    Physical Exam:  Vitals:   Temp (24hrs), Av.1 °F (36.2 °C), Min:96.2 °F (35.7 °C), Max:97.9 °F (36.6 °C)    Temp:  [96.2 °F (35.7 °C)-97.9 °F (36.6 °C)] 96.2 °F (35.7 °C)  Heart Rate:  [60-69] 65  Resp:  [16-18] 16  BP: (106-131)/(66-78) 110/73  /73 (BP Location: Right arm, Patient Position: Lying)   Pulse 65   Temp 96.2 °F (35.7 °C) (Oral)   Resp 16   Ht 157.5 cm (62.01\")   Wt 96.6 kg (212 lb 15.4 oz)   SpO2 97%   BMI 38.94 kg/m²     Exam:  NAD  PERRLA. Sclerae and conjunctivae normal  HENT: external inspection normal. Hearing intact.  No respiratory distress.  Alert, oriented, normal affect.         DATA:  Radiology and Labs:   Recent Results (from the past 24 hour(s))   Potassium    Collection Time: 20  8:55 PM   Result Value Ref Range    Potassium 4.2 3.5 - 5.2 mmol/L   Basic Metabolic Panel    Collection Time: 20  5:58 AM   Result Value Ref Range    Glucose 84 65 - 99 mg/dL    BUN 7 (L) 8 - 23 mg/dL    Creatinine 0.85 0.57 - 1.00 mg/dL    Sodium 136 136 - 145 mmol/L    Potassium 3.9 3.5 - 5.2 mmol/L    Chloride 102 98 - 107 mmol/L    CO2 24.1 22.0 - 29.0 mmol/L    Calcium 8.5 (L) 8.6 - 10.5 mg/dL    eGFR Non African Amer 67 >60 mL/min/1.73    BUN/Creatinine Ratio 8.2 7.0 - 25.0    Anion Gap 9.9 5.0 - 15.0 mmol/L   CBC (No Diff)    Collection Time: 20  5:58 AM   Result Value Ref Range    WBC 5.71 3.40 - 10.80 10*3/mm3    RBC 3.88 3.77 - 5.28 10*6/mm3    Hemoglobin 12.4 12.0 - 15.9 g/dL    Hematocrit 35.3 34.0 - 46.6 %    MCV 91.0 79.0 - 97.0 fL    MCH 32.0 26.6 - 33.0 pg    MCHC 35.1 31.5 - 35.7 g/dL    RDW 13.2 12.3 - 15.4 %    RDW-SD 43.4 37.0 - 54.0 fl    MPV 9.4 6.0 - 12.0 fL    Platelets 194 140 - 450 10*3/mm3   Magnesium    Collection Time: 20  5:58 AM   Result Value Ref Range    Magnesium 1.8 1.6 - 2.4 mg/dL   POC Glucose Once    Collection Time: 20 11:16 AM   Result Value Ref Range    Glucose 208 (H) 70 - 130 mg/dL "       Assessment:   Problem List:     DJD (degenerative joint disease) of knee    Hyponatremia    Hypokalemia    GERD without esophagitis    DELIA (obstructive sleep apnea)    Nausea    Loss of appetite    GI symptoms have resolved, at least for now.  Gastric biopsies are pending.    Plan:   No objection to outpatient management.  If she is discharged, she may follow-up with Dr. Blackmon next week regarding gastric biopsies.      Tae Oliver MD  [facility]  5/2/2020

## 2020-05-02 NOTE — PLAN OF CARE
Pt appetite improving. No zofran needed this shift for nausea. Using carafate prior to meds and having minimal discomfort with meals. K+ and Mg++ WNL today. Up ad alisa. BS of 208 noted in labs is in error and is recorded on the wrong patient. Please disregard. Per MD if labs remain stable and no increased nausea will most likely go home tomorrow.  Per Dr. Evans no need for fluid restriction right now.  Will continue to monitor

## 2020-05-02 NOTE — PROGRESS NOTES
LOS: 3 days     Chief Complaint/ Reason for encounter: Hyponatremia  Chief Complaint   Patient presents with   • Nausea   • Vomiting   • Weakness - Generalized         Subjective   Patient is doing well today with no new complaints  Good appetite with no nausea or vomiting  No shortness of breath chest pain or edema  Voiding well with no dysuria  Feels better today        Medical history reviewed:  History of Present Illness    Subjective    History taken from: Patient and chart    Vital Signs  Temp:  [97.1 °F (36.2 °C)-98.1 °F (36.7 °C)] 97.9 °F (36.6 °C)  Heart Rate:  [60-69] 69  Resp:  [16-22] 16  BP: ()/(63-78) 123/78       Wt Readings from Last 1 Encounters:   05/02/20 0445 96.6 kg (212 lb 15.4 oz)   05/01/20 0409 91.7 kg (202 lb 1.6 oz)   04/30/20 0615 89.2 kg (196 lb 11.2 oz)   04/29/20 2111 93 kg (205 lb)       Objective    Objective:  General Appearance:  Comfortable, well-appearing, in no acute distress and not in pain.  Awake, alert, oriented  HEENT: Mucous membranes moist, no injury, oropharynx clear  Lungs:  Normal effort and normal respiratory rate.  Breath sounds clear to auscultation.  No  respiratory distress.  No rales, decreased breath sounds or rhonchi.    Heart: Normal rate.  Regular rhythm.  S1 normal.  No murmur.   Abdomen: Abdomen is soft.  Bowel sounds are normal, no abdominal tenderness.  There is no rebound or guarding  Extremities: Normal range of motion.  No edema of bilateral lower extremities, distal pulses intact  Neurological: No focal motor or sensory deficits, pupils reactive  Skin:  Warm and dry.  No rash or cyanosis.       Results Review:    Intake/Output:     Intake/Output Summary (Last 24 hours) at 5/2/2020 0858  Last data filed at 5/2/2020 0409  Gross per 24 hour   Intake 2691.67 ml   Output 2300 ml   Net 391.67 ml         DATA:  Radiology and Labs:  The following labs independently reviewed by me. Additional labs ordered for tomorrow a.m.  Interval notes, chart  personally reviewed by me.   Old records independently reviewed showing previously normal to low normal sodium  The following radiologic studies independently viewed by me, findings chest x-ray no acute disease  New problems include improved hyponatremia  Discussed with patient herself at bedside    Risk/ complexity of medical care/ medical decision making moderate complexity    Labs:   Recent Results (from the past 24 hour(s))   Potassium    Collection Time: 05/01/20  8:55 PM   Result Value Ref Range    Potassium 4.2 3.5 - 5.2 mmol/L   Basic Metabolic Panel    Collection Time: 05/02/20  5:58 AM   Result Value Ref Range    Glucose 84 65 - 99 mg/dL    BUN 7 (L) 8 - 23 mg/dL    Creatinine 0.85 0.57 - 1.00 mg/dL    Sodium 136 136 - 145 mmol/L    Potassium 3.9 3.5 - 5.2 mmol/L    Chloride 102 98 - 107 mmol/L    CO2 24.1 22.0 - 29.0 mmol/L    Calcium 8.5 (L) 8.6 - 10.5 mg/dL    eGFR Non African Amer 67 >60 mL/min/1.73    BUN/Creatinine Ratio 8.2 7.0 - 25.0    Anion Gap 9.9 5.0 - 15.0 mmol/L   CBC (No Diff)    Collection Time: 05/02/20  5:58 AM   Result Value Ref Range    WBC 5.71 3.40 - 10.80 10*3/mm3    RBC 3.88 3.77 - 5.28 10*6/mm3    Hemoglobin 12.4 12.0 - 15.9 g/dL    Hematocrit 35.3 34.0 - 46.6 %    MCV 91.0 79.0 - 97.0 fL    MCH 32.0 26.6 - 33.0 pg    MCHC 35.1 31.5 - 35.7 g/dL    RDW 13.2 12.3 - 15.4 %    RDW-SD 43.4 37.0 - 54.0 fl    MPV 9.4 6.0 - 12.0 fL    Platelets 194 140 - 450 10*3/mm3   Magnesium    Collection Time: 05/02/20  5:58 AM   Result Value Ref Range    Magnesium 1.8 1.6 - 2.4 mg/dL       Radiology:  Imaging Results (Last 24 Hours)     ** No results found for the last 24 hours. **             Medications have been reviewed:  Current Facility-Administered Medications   Medication Dose Route Frequency Provider Last Rate Last Dose   • acetaminophen (TYLENOL) tablet 650 mg  650 mg Oral Q4H PRN Kirby Dee MD        Or   • acetaminophen (TYLENOL) 160 MG/5ML solution 650 mg  650 mg Oral Q4H PRN  Kirby Dee MD        Or   • acetaminophen (TYLENOL) suppository 650 mg  650 mg Rectal Q4H PRN Kirby Dee MD       • ALPRAZolam (XANAX) tablet 0.25 mg  0.25 mg Oral BID PRN Kirby Dee MD   0.25 mg at 05/01/20 0201   • bisacodyl (DULCOLAX) EC tablet 5 mg  5 mg Oral Daily PRN Kirby Dee MD   5 mg at 04/30/20 1716   • bisacodyl (DULCOLAX) suppository 10 mg  10 mg Rectal Daily PRN Kirby Dee MD       • buPROPion XL (WELLBUTRIN XL) 24 hr tablet 300 mg  300 mg Oral Daily Kirby Dee MD   300 mg at 05/02/20 0825   • levothyroxine (SYNTHROID, LEVOTHROID) tablet 88 mcg  88 mcg Oral Q AM Kirby Dee MD   88 mcg at 05/02/20 0600   • losartan (COZAAR) tablet 25 mg  25 mg Oral Q24H Kirby Dee MD   25 mg at 05/02/20 0824   • montelukast (SINGULAIR) tablet 10 mg  10 mg Oral QAM Kirby Dee MD   10 mg at 05/02/20 0601   • nebivolol (BYSTOLIC) tablet 40 mg  40 mg Oral Daily Kirby Dee MD   40 mg at 05/02/20 0825   • ondansetron (ZOFRAN) tablet 4 mg  4 mg Oral Q6H PRN Kirby Dee MD   4 mg at 05/01/20 1219    Or   • ondansetron (ZOFRAN) injection 4 mg  4 mg Intravenous Q6H PRN Kirby Dee MD   4 mg at 04/30/20 1716   • pantoprazole (PROTONIX) EC tablet 40 mg  40 mg Oral BID AC Kirby Dee MD   40 mg at 05/02/20 0729   • potassium chloride (MICRO-K) CR capsule 40 mEq  40 mEq Oral PRN Kirby Dee MD   40 mEq at 05/01/20 1712    Or   • potassium chloride (KLOR-CON) packet 40 mEq  40 mEq Oral PRN Kirby Dee MD        Or   • potassium chloride 10 mEq in 100 mL IVPB  10 mEq Intravenous Q1H PRN Kirby Dee  mL/hr at 05/01/20 0806 10 mEq at 05/01/20 0806   • rosuvastatin (CRESTOR) tablet 40 mg  40 mg Oral Daily Kirby Dee MD   40 mg at 05/02/20 0825   • sodium chloride 0.9 % flush 10 mL  10 mL Intravenous PRN Kirby Dee MD       •  sodium chloride 0.9 % flush 10 mL  10 mL Intravenous Q12H Kirby Dee MD   10 mL at 05/02/20 0825   • sodium chloride 0.9 % flush 10 mL  10 mL Intravenous PRN Kirby Dee MD       • sucralfate (CARAFATE) 1 GM/10ML suspension 1 g  1 g Oral 4x Daily AC & at Bedtime Kirby Dee MD   1 g at 05/02/20 0729       ASSESSMENT:  hyponatremia, secondary to volume depletion coupled with thiazide diuretic, sodium improved, near her normal level   hypokalemia, nutritional plus from thiazide, replaced  Dehydration, improved  Weakness, largely resolved  Weight loss with early satiety and nausea, EGD showed gastritis and hiatal hernia  Volume depletion  Chronic hypertension        PLAN:   Her sodium level continues to improve, back to normal levels today  Blood pressure well controlled, continue ARB  Continue to hold HCTZ indefinitely  Encourage hydration but avoid excessive water intake  No need for fluid restriction at this time  EGD findings noted    please call if any questions    Tae Evans MD   [facility]   Office phone number: [phone]  Answering service phone number: [phone]    05/02/20  08:58    Dictation performed using Dragon dictation software

## 2020-05-02 NOTE — PLAN OF CARE
Pt had a good night, voiced no complaints, no PRN meds given. Calls for assistance, but gait is steady. VSS. Continue to monitor.

## 2020-05-03 ENCOUNTER — READMISSION MANAGEMENT (OUTPATIENT)
Dept: CALL CENTER | Facility: HOSPITAL | Age: 65
End: 2020-05-03

## 2020-05-03 VITALS
HEART RATE: 62 BPM | BODY MASS INDEX: 38.66 KG/M2 | HEIGHT: 62 IN | RESPIRATION RATE: 18 BRPM | WEIGHT: 210.1 LBS | DIASTOLIC BLOOD PRESSURE: 86 MMHG | OXYGEN SATURATION: 99 % | TEMPERATURE: 97.2 F | SYSTOLIC BLOOD PRESSURE: 153 MMHG

## 2020-05-03 LAB
ANION GAP SERPL CALCULATED.3IONS-SCNC: 11.9 MMOL/L (ref 5–15)
BUN BLD-MCNC: 9 MG/DL (ref 8–23)
BUN/CREAT SERPL: 9.8 (ref 7–25)
CALCIUM SPEC-SCNC: 8.8 MG/DL (ref 8.6–10.5)
CHLORIDE SERPL-SCNC: 98 MMOL/L (ref 98–107)
CO2 SERPL-SCNC: 26.1 MMOL/L (ref 22–29)
CREAT BLD-MCNC: 0.92 MG/DL (ref 0.57–1)
DEPRECATED RDW RBC AUTO: 41.6 FL (ref 37–54)
ERYTHROCYTE [DISTWIDTH] IN BLOOD BY AUTOMATED COUNT: 12.6 % (ref 12.3–15.4)
GFR SERPL CREATININE-BSD FRML MDRD: 61 ML/MIN/1.73
GLUCOSE BLD-MCNC: 89 MG/DL (ref 65–99)
HCT VFR BLD AUTO: 35.8 % (ref 34–46.6)
HGB BLD-MCNC: 12.3 G/DL (ref 12–15.9)
MAGNESIUM SERPL-MCNC: 1.8 MG/DL (ref 1.6–2.4)
MCH RBC QN AUTO: 31.2 PG (ref 26.6–33)
MCHC RBC AUTO-ENTMCNC: 34.4 G/DL (ref 31.5–35.7)
MCV RBC AUTO: 90.9 FL (ref 79–97)
PLATELET # BLD AUTO: 191 10*3/MM3 (ref 140–450)
PMV BLD AUTO: 9.5 FL (ref 6–12)
POTASSIUM BLD-SCNC: 3.1 MMOL/L (ref 3.5–5.2)
RBC # BLD AUTO: 3.94 10*6/MM3 (ref 3.77–5.28)
SODIUM BLD-SCNC: 136 MMOL/L (ref 136–145)
WBC NRBC COR # BLD: 5.7 10*3/MM3 (ref 3.4–10.8)

## 2020-05-03 PROCEDURE — 83735 ASSAY OF MAGNESIUM: CPT | Performed by: INTERNAL MEDICINE

## 2020-05-03 PROCEDURE — 80048 BASIC METABOLIC PNL TOTAL CA: CPT | Performed by: INTERNAL MEDICINE

## 2020-05-03 PROCEDURE — 85027 COMPLETE CBC AUTOMATED: CPT | Performed by: INTERNAL MEDICINE

## 2020-05-03 RX ORDER — POTASSIUM CHLORIDE 20 MEQ/1
20 TABLET, EXTENDED RELEASE ORAL DAILY
Qty: 7 TABLET | Refills: 0 | Status: SHIPPED | OUTPATIENT
Start: 2020-05-03 | End: 2020-06-17 | Stop reason: ALTCHOICE

## 2020-05-03 RX ORDER — LOSARTAN POTASSIUM 25 MG/1
25 TABLET ORAL
Qty: 30 TABLET | Refills: 0 | Status: SHIPPED | OUTPATIENT
Start: 2020-05-04 | End: 2020-05-28

## 2020-05-03 RX ORDER — PANTOPRAZOLE SODIUM 40 MG/1
40 TABLET, DELAYED RELEASE ORAL
Qty: 60 TABLET | Refills: 0 | Status: SHIPPED | OUTPATIENT
Start: 2020-05-03 | End: 2020-10-01

## 2020-05-03 RX ORDER — SUCRALFATE 1 G/1
1 TABLET ORAL 4 TIMES DAILY
Qty: 120 TABLET | Refills: 0 | Status: SHIPPED | OUTPATIENT
Start: 2020-05-03 | End: 2020-06-17 | Stop reason: ALTCHOICE

## 2020-05-03 RX ORDER — POTASSIUM CHLORIDE 750 MG/1
20 TABLET, FILM COATED, EXTENDED RELEASE ORAL DAILY
Qty: 14 TABLET | Refills: 0 | Status: SHIPPED | OUTPATIENT
Start: 2020-05-03 | End: 2020-05-03 | Stop reason: SDUPTHER

## 2020-05-03 RX ORDER — POTASSIUM CHLORIDE 750 MG/1
20 TABLET, FILM COATED, EXTENDED RELEASE ORAL DAILY
Qty: 14 TABLET | Refills: 0 | Status: SHIPPED | OUTPATIENT
Start: 2020-05-03 | End: 2020-06-17 | Stop reason: ALTCHOICE

## 2020-05-03 RX ADMIN — BISACODYL 5 MG: 5 TABLET ORAL at 13:34

## 2020-05-03 RX ADMIN — PANTOPRAZOLE SODIUM 40 MG: 40 TABLET, DELAYED RELEASE ORAL at 07:07

## 2020-05-03 RX ADMIN — SUCRALFATE 1 G: 1 SUSPENSION ORAL at 11:26

## 2020-05-03 RX ADMIN — SODIUM CHLORIDE, PRESERVATIVE FREE 10 ML: 5 INJECTION INTRAVENOUS at 08:40

## 2020-05-03 RX ADMIN — LEVOTHYROXINE SODIUM 88 MCG: 88 TABLET ORAL at 07:07

## 2020-05-03 RX ADMIN — SUCRALFATE 1 G: 1 SUSPENSION ORAL at 07:07

## 2020-05-03 RX ADMIN — LOSARTAN POTASSIUM 25 MG: 25 TABLET, FILM COATED ORAL at 08:40

## 2020-05-03 RX ADMIN — ROSUVASTATIN CALCIUM 40 MG: 40 TABLET, FILM COATED ORAL at 08:40

## 2020-05-03 RX ADMIN — MONTELUKAST SODIUM 10 MG: 10 TABLET, FILM COATED ORAL at 07:07

## 2020-05-03 RX ADMIN — BUPROPION HYDROCHLORIDE 300 MG: 300 TABLET, EXTENDED RELEASE ORAL at 08:40

## 2020-05-03 RX ADMIN — NEBIVOLOL HYDROCHLORIDE 40 MG: 10 TABLET ORAL at 08:40

## 2020-05-03 RX ADMIN — POTASSIUM CHLORIDE 40 MEQ: 10 CAPSULE, COATED, EXTENDED RELEASE ORAL at 15:41

## 2020-05-03 RX ADMIN — POTASSIUM CHLORIDE 40 MEQ: 10 CAPSULE, COATED, EXTENDED RELEASE ORAL at 11:26

## 2020-05-03 RX ADMIN — POTASSIUM CHLORIDE 40 MEQ: 10 CAPSULE, COATED, EXTENDED RELEASE ORAL at 07:36

## 2020-05-03 NOTE — PLAN OF CARE
Pt had uneventful night. Voiced no concerns. Walked the dang with stand by assistance. No PRN medications needed. VSS. Continue to monitor.

## 2020-05-03 NOTE — DISCHARGE SUMMARY
PHYSICIAN DISCHARGE SUMMARY                                                                        [facility]    Patient Identification:  Name: Kelle Soliz  Age: 65 y.o.  Sex: female  :  1955  MRN: [id number]  Primary Care Physician: Ame Dempsey MD    Admit date: 2020  Discharge date and time:20    Discharged Condition: good    Discharge Diagnoses:  DJD (degenerative joint disease) of knee    Hyponatremia    Hypokalemia    GERD without esophagitis    DELIA (obstructive sleep apnea)    Nausea    Loss of appetite     Patient Active Problem List   Diagnosis Code   • Hypersomnia with sleep apnea G47.10, G47.30   • Snoring R06.83   • Hx of colonic polyps Z86.010   • Chest pain R07.9   • DJD (degenerative joint disease) of knee M17.10   • Hyponatremia E87.1   • Hypokalemia E87.6   • GERD without esophagitis K21.9   • DELIA (obstructive sleep apnea) G47.33   • Nausea R11.0   • Loss of appetite R63.0       PMHX:   Past Medical History:   Diagnosis Date   • Anemia in mid-40s   • Arthritis    • Cholelithiasis around    • Colon polyp     several each colonoscopy, last 10 years   • Diabetes mellitus (CMS/HCC)     pre-diabetic, for several years   • Disease of thyroid gland    • Diverticulosis    • Fatty liver     slightly fatty   • GERD (gastroesophageal reflux disease)    • Hemorrhoid    • Hernia     slight hiatal hernia   • Hx of colonic polyp    • Hyperlipidemia    • Hypertension    • Hypokalemia    • Hyponatremia    • Sleep apnea     cpap - does not use     PSHX:   Past Surgical History:   Procedure Laterality Date   • ABDOMINAL SURGERY  around     gallbladder (~), ovaries removed (~)   • CARDIAC CATHETERIZATION     •  SECTION      x2   • COLONOSCOPY N/A 3/6/2018    Procedure: COLONOSCOPY to terminal ileum with polypectomy (cold);  Surgeon: Eddie ELIZABETH MD;  Location: [facility]  "ENDOSCOPY;  Service:    • COLONOSCOPY W/ POLYPECTOMY     • FOOT ARTHRODESIS, MODIFIED KHAN Left    • KNEE ARTHROPLASTY Right    • LAPAROSCOPIC CHOLECYSTECTOMY     • LAPAROSCOPIC OOPHORECTOMY Bilateral    • TOTAL KNEE ARTHROPLASTY Left 3/28/2019    Procedure: LEFT TOTAL KNEE ARTHROPLASTY;  Surgeon: Neymar Hendricks MD;  Location: [facility];  Service: Orthopedics       Hospital Course: Kelle Soliz  was admitted with intractable nausea and anorexia; she had hyponatremia and was seen by nephrology; hctz was discontinued ; her sodium started improving; she was given iv fluids; she was seen by gi and an egd was done which showed gastritis and hiatal hernia;her appetite has returned to normal; potassium was low this morning and replacement was started; she was started on losartan and her bp has been controlled; she will need follow up with her pcp and recheck of her bmp and bp next week    Consults:     Consults     Date and Time Order Name Status Description    4/30/2020 1344 Inpatient Nephrology Consult Completed     4/30/2020 1153 Inpatient Gastroenterology Consult Completed     4/29/2020 9348 [facility] (on-call MD unless specified) Details Completed         Results from last 7 days   Lab Units 05/03/20  0548   WBC 10*3/mm3 5.70   HEMOGLOBIN g/dL 12.3   HEMATOCRIT % 35.8   PLATELETS 10*3/mm3 191     Results from last 7 days   Lab Units 05/03/20  0548   SODIUM mmol/L 136   POTASSIUM mmol/L 3.1*   CHLORIDE mmol/L 98   CO2 mmol/L 26.1   BUN mg/dL 9   CREATININE mg/dL 0.92   GLUCOSE mg/dL 89   CALCIUM mg/dL 8.8     Significant Diagnostic Studies: Labs in chart were reviewed.  Imaging Results (All)     None        /86 (BP Location: Right arm, Patient Position: Lying)   Pulse 62   Temp 97.2 °F (36.2 °C) (Oral)   Resp 18   Ht 157.5 cm (62.01\")   Wt 95.3 kg (210 lb 1.6 oz)   SpO2 99%   BMI 38.42 kg/m²     Discharge Exam:  General Appearance:    Alert, cooperative, no distress, AAOx3                          " Head:    Normocephalic, without obvious abnormality, atraumatic                          Eyes:    PERRL, conjunctiva/corneas clear, EOM's intact, both eyes                        Throat:   Lips, tongue, gums normal; oral mucosa pink and moist                          Neck:   Supple, symmetrical, trachea midline, no JVD                        Lungs:     Clear to auscultation bilaterally, respirations unlabored                Chest Wall:    No tenderness or deformity                        Heart:    Regular rate and rhythm, S1 and S2 normal, no murmur,no  Rub                                       or gallop                  Abdomen:     Soft, non-tender, bowel sounds active, no masses, no                                                        organomegaly                  Extremities:   Extremities normal, atraumatic, no cyanosis or edema, pulses                                           palpable in all extremities                             Skin:   Skin is warm and dry,  no rashes or palpable lesions                  Neurologic:   CNII-XII intact, motor strength grossly intact, sensation grossly                                           intact to light touch, no focal deficits noted     Disposition:  Home    Patient Instructions:      Discharge Medications      New Medications      Instructions Start Date   losartan 25 MG tablet  Commonly known as:  COZAAR   25 mg, Oral, Every 24 Hours Scheduled   Start Date:  May 4, 2020     pantoprazole 40 MG EC tablet  Commonly known as:  PROTONIX   40 mg, Oral, 2 Times Daily Before Meals      sucralfate 1 g tablet  Commonly known as:  Carafate   1 g, Oral, 4 Times Daily      Potassium chloride 20meq po daily times 7 days   Changes to Medications      Instructions Start Date   potassium chloride 10 MEQ CR tablet  Commonly known as:  K-DUR  What changed:  Another medication with the same name was removed. Continue taking this medication, and follow the directions you see here.   20  mEq, Oral, Daily         Continue These Medications      Instructions Start Date   ALPRAZolam 0.25 MG tablet  Commonly known as:  XANAX   0.25 mg, Oral, As Needed      buPROPion  MG 24 hr tablet  Commonly known as:  WELLBUTRIN XL   300 mg, Oral, Every Morning      cholecalciferol 25 MCG (1000 UT) tablet  Commonly known as:  VITAMIN D3   1,000 Units, Oral, Daily      levothyroxine 88 MCG tablet  Commonly known as:  SYNTHROID, LEVOTHROID   88 mcg, Oral, Daily      Melatonin 10 MG sublingual tablet   10 mg, Sublingual, Nightly PRN      montelukast 10 MG tablet  Commonly known as:  SINGULAIR   10 mg, Oral, Every Morning      nebivolol 20 MG tablet  Commonly known as:  BYSTOLIC   40 mg, Oral, Daily      ondansetron ODT 4 MG disintegrating tablet  Commonly known as:  Zofran ODT   4 mg, Translingual, Every 8 Hours PRN      rosuvastatin 40 MG tablet  Commonly known as:  CRESTOR   40 mg, Oral, Daily         Stop These Medications    acetaminophen 500 MG tablet  Commonly known as:  TYLENOL     amoxicillin 500 MG capsule  Commonly known as:  AMOXIL     CLOBETASOL PROPIONATE EX     Edarbyclor 40-25 MG tablet  Generic drug:  Azilsartan-Chlorthalidone     estradiol 0.1 MG/GM vaginal cream  Commonly known as:  ESTRACE     famotidine 10 MG tablet  Commonly known as:  PEPCID     Scopolamine 1.5 MG/3DAYS patch  Commonly known as:  TRANSDERM-SCOP     sertraline 50 MG tablet  Commonly known as:  ZOLOFT            No future appointments.  Follow-up Information     Ame Dempsey MD .    Specialty:  Internal Medicine  Contact information:  [facility] [place] [address]  [phone]                 Discharge Order (From admission, onward)     Start     Ordered    05/03/20 1502  Discharge patient  Once     Expected Discharge Date:  05/03/20    Discharge Disposition:  Home or Self Care    Physician of Record for Attribution - Please select from Treatment Team:  XIOMARA BRADFORD [[id number]]    Review needed by CMO to  determine Physician of Record:  No       Question Answer Comment   Physician of Record for Attribution - Please select from Treatment Team JAYDE BRADFORD    Review needed by CMO to determine Physician of Record No        05/03/20 1504                   Signed:  Jayde Bradford MD  5/3/2020  19:04

## 2020-05-03 NOTE — OUTREACH NOTE
Prep Survey      Responses   Denominational facility patient discharged from?  [facility]   Is LACE score < 7 ?  No   Eligibility  Readm Mgmt   Discharge diagnosis   Nausea,   Unintentional weight loss,   Hyponatremia,   Hypokalemia   COVID-19 Test Status  Not tested   Does the patient have one of the following disease processes/diagnoses(primary or secondary)?  Other   Does the patient have Home health ordered?  No   Is there a DME ordered?  No   Prep survey completed?  Yes          Veronica Mcguire RN

## 2020-05-03 NOTE — PROGRESS NOTES
LOS: 4 days     Chief Complaint/ Reason for encounter: Hyponatremia  Chief Complaint   Patient presents with   • Nausea   • Vomiting   • Weakness - Generalized         Subjective   Patient is doing well today with no new complaints  Good appetite with no nausea or vomiting  No shortness of breath chest pain or edema  Voiding well with no dysuria  Feels better today  BP at goal        Medical history reviewed:  History of Present Illness    Subjective    History taken from: Patient and chart    Vital Signs  Temp:  [96.2 °F (35.7 °C)-97.5 °F (36.4 °C)] 97.5 °F (36.4 °C)  Heart Rate:  [61-67] 63  Resp:  [16-18] 18  BP: (110-142)/(70-82) 136/82       Wt Readings from Last 1 Encounters:   05/03/20 0455 95.3 kg (210 lb 1.6 oz)   05/02/20 0445 96.6 kg (212 lb 15.4 oz)   05/01/20 0409 91.7 kg (202 lb 1.6 oz)   04/30/20 0615 89.2 kg (196 lb 11.2 oz)   04/29/20 2111 93 kg (205 lb)       Objective    Objective:  General Appearance:  Comfortable, well-appearing, in no acute distress and not in pain.  Awake, alert, oriented  HEENT: Mucous membranes moist, no injury, oropharynx clear  Lungs:  Normal effort and normal respiratory rate.  Breath sounds clear to auscultation.  No  respiratory distress.  No rales, decreased breath sounds or rhonchi.    Heart: Normal rate.  Regular rhythm.  S1 normal.  No murmur.   Abdomen: Abdomen is soft.  Bowel sounds are normal, no abdominal tenderness.  There is no rebound or guarding  Extremities: Normal range of motion.  No edema of bilateral lower extremities, distal pulses intact  Neurological: No focal motor or sensory deficits, pupils reactive  Skin:  Warm and dry.  No rash or cyanosis.       Results Review:    Intake/Output:     Intake/Output Summary (Last 24 hours) at 5/3/2020 4351  Last data filed at 5/3/2020 0455  Gross per 24 hour   Intake --   Output 800 ml   Net -800 ml         DATA:  Radiology and Labs:  The following labs independently reviewed by me. Additional labs ordered for  tomorrow a.m.  Interval notes, chart personally reviewed by me.   Discussed with patient herself at bedside    Risk/ complexity of medical care/ medical decision making moderate complexity    Labs:   Recent Results (from the past 24 hour(s))   POC Glucose Once    Collection Time: 05/02/20 11:16 AM   Result Value Ref Range    Glucose 208 (H) 70 - 130 mg/dL   Basic Metabolic Panel    Collection Time: 05/03/20  5:48 AM   Result Value Ref Range    Glucose 89 65 - 99 mg/dL    BUN 9 8 - 23 mg/dL    Creatinine 0.92 0.57 - 1.00 mg/dL    Sodium 136 136 - 145 mmol/L    Potassium 3.1 (L) 3.5 - 5.2 mmol/L    Chloride 98 98 - 107 mmol/L    CO2 26.1 22.0 - 29.0 mmol/L    Calcium 8.8 8.6 - 10.5 mg/dL    eGFR Non African Amer 61 >60 mL/min/1.73    BUN/Creatinine Ratio 9.8 7.0 - 25.0    Anion Gap 11.9 5.0 - 15.0 mmol/L   CBC (No Diff)    Collection Time: 05/03/20  5:48 AM   Result Value Ref Range    WBC 5.70 3.40 - 10.80 10*3/mm3    RBC 3.94 3.77 - 5.28 10*6/mm3    Hemoglobin 12.3 12.0 - 15.9 g/dL    Hematocrit 35.8 34.0 - 46.6 %    MCV 90.9 79.0 - 97.0 fL    MCH 31.2 26.6 - 33.0 pg    MCHC 34.4 31.5 - 35.7 g/dL    RDW 12.6 12.3 - 15.4 %    RDW-SD 41.6 37.0 - 54.0 fl    MPV 9.5 6.0 - 12.0 fL    Platelets 191 140 - 450 10*3/mm3   Magnesium    Collection Time: 05/03/20  5:48 AM   Result Value Ref Range    Magnesium 1.8 1.6 - 2.4 mg/dL       Radiology:  Imaging Results (Last 24 Hours)     ** No results found for the last 24 hours. **             Medications have been reviewed:  Current Facility-Administered Medications   Medication Dose Route Frequency Provider Last Rate Last Dose   • acetaminophen (TYLENOL) tablet 650 mg  650 mg Oral Q4H PRN Kirby Dee MD        Or   • acetaminophen (TYLENOL) 160 MG/5ML solution 650 mg  650 mg Oral Q4H PRN Kirby Dee MD        Or   • acetaminophen (TYLENOL) suppository 650 mg  650 mg Rectal Q4H PRN Kirby Dee MD       • ALPRAZolam (XANAX) tablet 0.25 mg  0.25 mg  Oral BID PRN Kirby Dee MD   0.25 mg at 05/01/20 0201   • bisacodyl (DULCOLAX) EC tablet 5 mg  5 mg Oral Daily PRN Kirby Dee MD   5 mg at 04/30/20 1716   • bisacodyl (DULCOLAX) suppository 10 mg  10 mg Rectal Daily PRN Kirby Dee MD       • buPROPion XL (WELLBUTRIN XL) 24 hr tablet 300 mg  300 mg Oral Daily Kirby Dee MD   300 mg at 05/02/20 0825   • levothyroxine (SYNTHROID, LEVOTHROID) tablet 88 mcg  88 mcg Oral Q AM Kirby Dee MD   88 mcg at 05/03/20 0707   • losartan (COZAAR) tablet 25 mg  25 mg Oral Q24H Kirby Dee MD   25 mg at 05/02/20 0824   • montelukast (SINGULAIR) tablet 10 mg  10 mg Oral QAM Kirby Dee MD   10 mg at 05/03/20 0707   • nebivolol (BYSTOLIC) tablet 40 mg  40 mg Oral Daily Kirby Dee MD   40 mg at 05/02/20 0825   • ondansetron (ZOFRAN) tablet 4 mg  4 mg Oral Q6H PRN Kirby Dee MD   4 mg at 05/01/20 1219    Or   • ondansetron (ZOFRAN) injection 4 mg  4 mg Intravenous Q6H PRN Kirby Dee MD   4 mg at 04/30/20 1716   • pantoprazole (PROTONIX) EC tablet 40 mg  40 mg Oral BID AC Kirby Dee MD   40 mg at 05/03/20 0707   • potassium chloride (MICRO-K) CR capsule 40 mEq  40 mEq Oral PRN Kirby Dee MD   40 mEq at 05/03/20 0736    Or   • potassium chloride (KLOR-CON) packet 40 mEq  40 mEq Oral PRN Kirby Dee MD        Or   • potassium chloride 10 mEq in 100 mL IVPB  10 mEq Intravenous Q1H PRN Kirby Dee  mL/hr at 05/01/20 0806 10 mEq at 05/01/20 0806   • rosuvastatin (CRESTOR) tablet 40 mg  40 mg Oral Daily Kirby Dee MD   40 mg at 05/02/20 0825   • sodium chloride 0.9 % flush 10 mL  10 mL Intravenous PRN Kirby Dee MD       • sodium chloride 0.9 % flush 10 mL  10 mL Intravenous Q12H Kirby Dee MD   10 mL at 05/02/20 2020   • sodium chloride 0.9 % flush 10 mL  10 mL Intravenous PRN Leila  Kirby Montanez MD       • sucralfate (CARAFATE) 1 GM/10ML suspension 1 g  1 g Oral 4x Daily AC & at Bedtime Kirby Dee MD   1 g at 05/03/20 0707       ASSESSMENT:  hyponatremia, secondary to volume depletion coupled with thiazide diuretic, sodium improved, near her normal level, 136   hypokalemia, nutritional plus from thiazide, replaced per protocol  Weakness, largely resolved  Weight loss with early satiety and nausea  EGD showed gastritis and hiatal hernia  Volume depletion  Chronic hypertension        PLAN:   Her sodium level is stable today at 136  Blood pressure well controlled, continue ARB at current dose  hold HCTZ indefinitely  Encourage hydration but avoid excessive water intake  No need for fluid restriction at this time  EGD findings noted  Replace K per protocol  OK for D/C    please call if any questions    Tae Evans MD   [facility]   Office phone number: [phone]  Answering service phone number: [phone]    05/03/20  08:29    Dictation performed using Dragon dictation software

## 2020-05-04 ENCOUNTER — READMISSION MANAGEMENT (OUTPATIENT)
Dept: CALL CENTER | Facility: HOSPITAL | Age: 65
End: 2020-05-04

## 2020-05-04 ENCOUNTER — DOCUMENTATION (OUTPATIENT)
Dept: SOCIAL WORK | Facility: HOSPITAL | Age: 65
End: 2020-05-04

## 2020-05-04 LAB
CYTO UR: NORMAL
LAB AP CASE REPORT: NORMAL
PATH REPORT.FINAL DX SPEC: NORMAL
PATH REPORT.GROSS SPEC: NORMAL

## 2020-05-04 NOTE — PAYOR COMM NOTE
"DISCHARGED    REF #[id number]        Kelle Soliz (65 y.o. Female)     Date of Birth Social Security Number Address Home Phone MRN    [id number]  [id number] [place] [address] [phone] [id number]    Adventism Marital Status          Orthodox        Admission Date Admission Type Admitting Provider Attending Provider Department, Room/Bed    4/29/20 Emergency Osmar Castillo MD  [address], P386/1    Discharge Date Discharge Disposition Discharge Destination        5/3/2020 Home or Self Care              Attending Provider:  (none)   Allergies:  No Known Allergies    Isolation:  None   Infection:  None   Code Status:  Prior    Ht:  157.5 cm (62.01\")   Wt:  95.3 kg (210 lb 1.6 oz)    Admission Cmt:  None   Principal Problem:  None                Active Insurance as of 4/29/2020     Primary Coverage     Payor Plan Insurance Group Employer/Plan Group    [id number]     Payor Plan Address Payor Plan Phone Number Payor Plan Fax Number Effective Dates    [address]   1/9/2016 - None Entered    [facility] [id number]       Subscriber Name Subscriber Birth Date Member ID       GLADIS SOLIZ 5/11/1956 [id number]           Secondary Coverage     Payor Plan Insurance Group Employer/Plan Group    MEDICARE MEDICARE A ONLY      Payor Plan Address Payor Plan Phone Number Payor Plan Fax Number Effective Dates    [address] [phone]  1/1/2018 - None Entered    [facility] [id number]       Subscriber Name Subscriber Birth Date Member ID       KELLE SOLIZ 1955 [id number]                 Emergency Contacts      (Rel.) Home Phone Work Phone Mobile Phone    Gladis Soliz (Spouse) [phone] -- --    KalyanMakayla (Daughter) [phone] -- --          "

## 2020-05-04 NOTE — OUTREACH NOTE
Medical Week 1 Survey      Responses   [facility]   COVID-19 Test Status  Not tested   Does the patient have one of the following disease processes/diagnoses(primary or secondary)?  Other   Is there a successful TCM telephone encounter documented?  No   Week 1 attempt successful?  Yes   Call start time  1325   Call end time  1328   Discharge diagnosis   Nausea,   Unintentional weight loss,   Hyponatremia,   Hypokalemia   Meds reviewed with patient/caregiver?  Yes   Is the patient having any side effects they believe may be caused by any medication additions or changes?  No   Does the patient have all medications ordered at discharge?  Yes   Is the patient taking all medications as directed (includes completed medication regime)?  Yes   Does the patient have a primary care provider?   Yes   Does the patient have an appointment with their PCP within 7 days of discharge?  Yes   Has the patient kept scheduled appointments due by today?  N/A   Has home health visited the patient within 72 hours of discharge?  N/A   Pulse Ox monitoring  None   Psychosocial issues?  No   Did the patient receive a copy of their discharge instructions?  Yes   What is the patient's perception of their health status since discharge?  Same   Is the patient/caregiver able to teach back signs and symptoms related to disease process for when to call PCP?  Yes   Is the patient/caregiver able to teach back signs and symptoms related to disease process for when to call 911?  Yes   Is the patient/caregiver able to teach back the hierarchy of who to call/visit for symptoms/problems? PCP, Specialist, Home health nurse, Urgent Care, ED, 911  Yes   Week 1 call completed?  Yes   Wrap up additional comments  DOing ok other than nausea today.  She has a call in to her PCP for some meds.  Brief call as PCP called during our call.          Ame Francisco RN

## 2020-05-04 NOTE — PROGRESS NOTES
Case Management Discharge Note      Final Note: Home    Provided Post Acute Provider List?: N/A    Destination      No service has been selected for the patient.      Durable Medical Equipment      No service has been selected for the patient.      Dialysis/Infusion      No service has been selected for the patient.      Home Medical Care      No service has been selected for the patient.      Therapy      No service has been selected for the patient.      Community Resources      No service has been selected for the patient.        Transportation Services  Private: Car    Final Discharge Disposition Code: 01 - home or self-care     Shyann Alejandre RN

## 2020-05-05 ENCOUNTER — TELEPHONE (OUTPATIENT)
Dept: GASTROENTEROLOGY | Facility: CLINIC | Age: 65
End: 2020-05-05

## 2020-05-05 RX ORDER — AMOXICILLIN 500 MG/1
1000 CAPSULE ORAL 2 TIMES DAILY
Qty: 56 CAPSULE | Refills: 0 | Status: SHIPPED | OUTPATIENT
Start: 2020-05-05 | End: 2020-05-28

## 2020-05-05 RX ORDER — CLARITHROMYCIN 500 MG/1
500 TABLET, COATED ORAL 2 TIMES DAILY
Qty: 28 TABLET | Refills: 0 | Status: SHIPPED | OUTPATIENT
Start: 2020-05-05 | End: 2020-05-28

## 2020-05-05 NOTE — TELEPHONE ENCOUNTER
----- Message from Eddie ELIZABETH MD sent at 5/4/2020  4:32 PM EDT -----  Regarding: RE: EGD results  Okay to notify patient of positive Helicobacter pylori findings.  Would offer either Prevpac or Pylera for treatment purposes and continue PPI after initial dose completed.  ----- Message -----  From: Kirby Dee MD  Sent: 5/4/2020   1:40 PM EDT  To: Eddie ELIZABETH MD, #  Subject: EGD results                                      H pylori gastritis - will defer treatment and follow up to Dr Blackmon.      ----- Message -----  From: Lab, Background User  Sent: 5/4/2020   8:36 AM EDT  To: Kirby Dee MD

## 2020-05-05 NOTE — TELEPHONE ENCOUNTER
Called pt and advised of Dr Blackmon's note. Pt verb understanding and confirmed she has no drug allergies.  Pt is currently taking pantoprazole bid.  Advised pt we will escribe amoxicillin 500mg take 2 po bid #56 and biaxin 500 take one po bid #28 to her [facility] pharmacy.  Advised pt that Dr Blackmon would like her to continue pantoprazole once she completes antibx.  Pt verb understanding and will let us know when she completed antibx so we can send in new script.

## 2020-05-07 ENCOUNTER — TELEPHONE (OUTPATIENT)
Dept: GASTROENTEROLOGY | Facility: CLINIC | Age: 65
End: 2020-05-07

## 2020-05-07 NOTE — TELEPHONE ENCOUNTER
----- Message from Majo Beltran sent at 5/6/2020  4:10 PM EDT -----  Regarding: ANTIBIOTICS  Contact: [phone]  PT HAVING QUESTIONS ABOUT MEDICATION AND ASKING IF SHE'S SHOULD BE TAKING AN PROBIOTICS.

## 2020-05-07 NOTE — TELEPHONE ENCOUNTER
Okay for patient to use probiotic in light of treatment for H. pylori.  She can taper or stop her Carafate at this time and maintain PPI.  Would advise she call with a progress report once treatment complete and pending her response to consider further follow-up in the office setting.

## 2020-05-07 NOTE — TELEPHONE ENCOUNTER
Call to pt.  Asking if needs f/u from hosp.  How long should stay on carafate.  If should take probiotics.    Questions to Dr Blackmon.

## 2020-05-11 ENCOUNTER — READMISSION MANAGEMENT (OUTPATIENT)
Dept: CALL CENTER | Facility: HOSPITAL | Age: 65
End: 2020-05-11

## 2020-05-11 NOTE — OUTREACH NOTE
Medical Week 2 Survey      Responses   [facility] patient discharged from?  [place]   COVID-19 Test Status  Not tested   Does the patient have one of the following disease processes/diagnoses(primary or secondary)?  Other   Week 2 attempt successful?  No   Unsuccessful attempts  Attempt 1          Tarik Castaneda RN

## 2020-05-14 ENCOUNTER — READMISSION MANAGEMENT (OUTPATIENT)
Dept: CALL CENTER | Facility: HOSPITAL | Age: 65
End: 2020-05-14

## 2020-05-14 ENCOUNTER — TELEPHONE (OUTPATIENT)
Dept: GASTROENTEROLOGY | Facility: CLINIC | Age: 65
End: 2020-05-14

## 2020-05-14 NOTE — TELEPHONE ENCOUNTER
----- Message from Inder Gonzales sent at 5/14/2020  2:02 PM EDT -----  Regarding: Questions   Contact: [phone]  Patients states she has several questions about medication and severe constipation

## 2020-05-14 NOTE — TELEPHONE ENCOUNTER
Agree with use of laxative as needed, may also need to administer enemas to help facilitate clearance from the rectal area.  Once system is cleared she will need to be on a stool softener and a regular bowel regimen.

## 2020-05-14 NOTE — OUTREACH NOTE
Medical Week 2 Survey      Responses   [facility] patient discharged from?  [place]   COVID-19 Test Status  Not tested   Does the patient have one of the following disease processes/diagnoses(primary or secondary)?  Other   Week 2 attempt successful?  Yes   Call start time  1313   Discharge diagnosis   Nausea,   Unintentional weight loss,   Hyponatremia,   Hypokalemia   Call end time  1325   Meds reviewed with patient/caregiver?  Yes   Is the patient taking all medications as directed (includes completed medication regime)?  Yes   Comments regarding PCP  telehealth appt with PCP on 5/13/20   Has the patient kept scheduled appointments due by today?  Yes   Pulse Ox monitoring  None   Nursing interventions  Educated on MyChart   What is the patient's perception of their health status since discharge?  Same [Pt reports she's have a great deal of trouble with constipation. Encouraged her to reach out to PCP or GI provider.]   Week 2 Call Completed?  Yes          Екатерина Ludwig RN

## 2020-05-14 NOTE — TELEPHONE ENCOUNTER
Called pt and pt reports that she is having severe problems with constipation.  Pt reports she is drinking plenty of water and is eating lots of vegatables.  Pt is taking stool softener nightly.  Her stools are very and hard and are very difficult to get out and her hemorrhoids to bleed.  She reports that at times her stools just will not come out. .    She did talk with Dr Dempsey last night who advised her to take one dose of miralax.   She is asking what can she do or take. ADvised will send message to Dr Blackmon and in the meantime for her hemorrhoids she can take sitz baths and use prep h.   Pt verb understanding.

## 2020-05-21 ENCOUNTER — READMISSION MANAGEMENT (OUTPATIENT)
Dept: CALL CENTER | Facility: HOSPITAL | Age: 65
End: 2020-05-21

## 2020-05-21 NOTE — OUTREACH NOTE
Medical Week 3 Survey      Responses   [facility] patient discharged from?  [place]   COVID-19 Test Status  Not tested   Does the patient have one of the following disease processes/diagnoses(primary or secondary)?  Other   Week 3 attempt successful?  Yes   Call start time  1613   Call end time  1622   Discharge diagnosis   Nausea,   Unintentional weight loss,   Hyponatremia,   Hypokalemia   Meds reviewed with patient/caregiver?  Yes   Is the patient having any side effects they believe may be caused by any medication additions or changes?  No   Does the patient have all medications ordered at discharge?  Yes   Is the patient taking all medications as directed (includes completed medication regime)?  Yes   Does the patient have a primary care provider?   Yes   Does the patient have an appointment with their PCP within 7 days of discharge?  Yes   Has the patient kept scheduled appointments due by today?  Yes   Has home health visited the patient within 72 hours of discharge?  N/A   Pulse Ox monitoring  None   Psychosocial issues?  No   Psychosocial comments  pt states still having BP elevations, primarily at night   Did the patient receive a copy of their discharge instructions?  Yes   Nursing interventions  Reviewed instructions with patient   What is the patient's perception of their health status since discharge?  Improving   Is the patient/caregiver able to teach back signs and symptoms related to disease process for when to call PCP?  Yes   Is the patient/caregiver able to teach back signs and symptoms related to disease process for when to call 911?  Yes   Is the patient/caregiver able to teach back the hierarchy of who to call/visit for symptoms/problems? PCP, Specialist, Home health nurse, Urgent Care, ED, 911  Yes   Additional teach back comments  states still tryijng to regulate BP's, meds doses have been changed 5/20/20, and med added    Week 3 Call Completed?  Yes          Nayana Feng RN

## 2020-05-26 ENCOUNTER — TELEPHONE (OUTPATIENT)
Dept: GASTROENTEROLOGY | Facility: CLINIC | Age: 65
End: 2020-05-26

## 2020-05-26 NOTE — TELEPHONE ENCOUNTER
Called pt and pt reports that her symptoms are returning. Advised that Dr blackmon wanted to see how her response to her meds would be and if symptoms come back and he wanted her to f/u.  Pt verb understanding and made appt for 05/28/ at 130p with Dr Blackmon.

## 2020-05-26 NOTE — TELEPHONE ENCOUNTER
----- Message from Inder Gonzales sent at 5/26/2020 12:01 PM EDT -----  Regarding: Concers   Contact: [phone]  Patient states she was recently in the hospital and diagnosed with Gastritis. States she is still on her Protonix twice a day but that she's finished her Antibiotics a week ago and she is starting to experience the same problems again: Trouble eating/loss of appetite, small pain in stomach, and nausea.     #also stated that while in the hospital her blood pressure medication was changed and her blood pressure still isn't stable (high) and doesn't know if this can affect her stomach problems.

## 2020-05-28 ENCOUNTER — OFFICE VISIT (OUTPATIENT)
Dept: GASTROENTEROLOGY | Facility: CLINIC | Age: 65
End: 2020-05-28

## 2020-05-28 VITALS — WEIGHT: 202 LBS | TEMPERATURE: 97 F | HEIGHT: 63 IN | BODY MASS INDEX: 35.79 KG/M2

## 2020-05-28 DIAGNOSIS — R10.9 ABDOMINAL PAIN, UNSPECIFIED ABDOMINAL LOCATION: Primary | ICD-10-CM

## 2020-05-28 DIAGNOSIS — A04.8 POSITIVE HELICOBACTER PYLORI TEST: ICD-10-CM

## 2020-05-28 DIAGNOSIS — R11.0 NAUSEA: ICD-10-CM

## 2020-05-28 PROCEDURE — 99214 OFFICE O/P EST MOD 30 MIN: CPT | Performed by: INTERNAL MEDICINE

## 2020-05-28 RX ORDER — AZILSARTAN KAMEDOXOMIL 80 MG/1
80 TABLET ORAL DAILY
COMMUNITY
Start: 2020-05-19

## 2020-05-28 RX ORDER — LABETALOL 100 MG/1
100 TABLET, FILM COATED ORAL 2 TIMES DAILY
COMMUNITY
Start: 2020-05-22 | End: 2020-06-17 | Stop reason: ALTCHOICE

## 2020-05-28 NOTE — PROGRESS NOTES
Chief Complaint   Patient presents with   • H. pylori   • Abdominal Pain   • Nausea        Kelle Soliz is a  65 y.o. female here for a follow up visit for abdominal pain, nausea, history of H. pylori    HPI this 65-year-old white female patient presents in follow-up since she had undergone endoscopic evaluation earlier this month.  That study had been done secondary to episodes of nausea vomiting and abdominal pain.  Had endoscopy revealed gastritis and biopsies were consistent with Helicobacter pylori.  She was treated for that infection with symptomatic improvement but once she stopped the antibiotic regimen some of her symptoms returned.  Specifically she describes an epigastric pain as well as the nausea.  There may also be contributing issues related to a change in her blood pressure medications which were adjusted during that hospitalization and subsequent to that visit.  She has concerns with this and also describes some anxiety issues which are being addressed with treatment.  There is some component of postprandial fullness although her endoscopy did not reveal any structural abnormalities.  We talked about possible evaluation with a gastric emptying study but will defer that for the present.  I am going to order an H. pylori breath test to start and pending those results we will decide on further treatment.  She will continue her proton pump inhibitor for the present.  He plans to discuss all her other issues with her primary care tender by tele-visit early next week.    Past Medical History:   Diagnosis Date   • Anemia in mid-40s   • Arthritis    • Cholelithiasis around 2000   • Colon polyp     several each colonoscopy, last 10 years   • Diabetes mellitus (CMS/HCC)     pre-diabetic, for several years   • Disease of thyroid gland    • Diverticulosis    • Fatty liver     slightly fatty   • GERD (gastroesophageal reflux disease)    • Hemorrhoid    • Hernia     slight hiatal hernia   • Hx of colonic  polyp    • Hyperlipidemia    • Hypertension    • Hypokalemia    • Hyponatremia    • Sleep apnea     cpap - does not use       Current Outpatient Medications   Medication Sig Dispense Refill   • buPROPion XL (WELLBUTRIN XL) 300 MG 24 hr tablet Take 150 mg by mouth Every Morning.     • EDARBI 80 MG tablet tablet      • labetalol (NORMODYNE) 100 MG tablet Take 100 mg by mouth 2 (Two) Times a Day.     • levothyroxine (SYNTHROID, LEVOTHROID) 88 MCG tablet Take 88 mcg by mouth Daily.     • Melatonin 10 MG sublingual tablet Place 10 mg under the tongue At Night As Needed.     • montelukast (SINGULAIR) 10 MG tablet Take 10 mg by mouth Every Morning.     • NON FORMULARY Stool softner     • pantoprazole (PROTONIX) 40 MG EC tablet Take 1 tablet by mouth 2 (Two) Times a Day Before Meals. 60 tablet 0   • Probiotic Product (PROBIOTIC PO) Take  by mouth.     • rosuvastatin (CRESTOR) 40 MG tablet Take 40 mg by mouth Daily.     • sertraline (ZOLOFT) 50 MG tablet Take 50 mg by mouth Daily.     • ALPRAZolam (XANAX) 0.25 MG tablet Take 0.25 mg by mouth As Needed (flying).     • potassium chloride (K-DUR) 10 MEQ CR tablet Take 2 tablets by mouth Daily. 14 tablet 0   • potassium chloride (K-DUR,KLOR-CON) 20 MEQ CR tablet Take 1 tablet by mouth Daily. 7 tablet 0   • sucralfate (Carafate) 1 g tablet Take 1 tablet by mouth 4 (Four) Times a Day. 120 tablet 0     No current facility-administered medications for this visit.        PRN Meds:.    No Known Allergies    Social History     Socioeconomic History   • Marital status:      Spouse name: Not on file   • Number of children: Not on file   • Years of education: Not on file   • Highest education level: Not on file   Tobacco Use   • Smoking status: Never Smoker   • Smokeless tobacco: Never Used   Substance and Sexual Activity   • Alcohol use: No   • Drug use: No   • Sexual activity: Defer     Partners: Male       Family History   Problem Relation Age of Onset   • Heart disease Mother     • Hypertension Mother    • Diabetes Mother    • Heart disease Father    • Stroke Father         carotid artery disease   • Hyperlipidemia Father    • Alcohol abuse Father    • Heart disease Brother    • Diabetes Brother    • Hyperlipidemia Brother    • Hypertension Maternal Grandmother    • Malig Hyperthermia Neg Hx        Review of Systems   Constitutional: Negative for activity change, appetite change, fatigue and unexpected weight change.   HENT: Negative for congestion, facial swelling, sore throat, trouble swallowing and voice change.    Eyes: Negative for photophobia and visual disturbance.   Respiratory: Negative for cough and choking.    Cardiovascular: Negative for chest pain.   Gastrointestinal: Positive for abdominal pain and nausea. Negative for abdominal distention, anal bleeding, blood in stool, constipation, diarrhea, rectal pain and vomiting.   Endocrine: Negative for polyphagia.   Musculoskeletal: Negative for arthralgias, gait problem and joint swelling.   Skin: Negative for color change, pallor and rash.   Allergic/Immunologic: Negative for food allergies.   Neurological: Negative for speech difficulty and headaches.   Hematological: Does not bruise/bleed easily.   Psychiatric/Behavioral: Negative for agitation, confusion and sleep disturbance. The patient is nervous/anxious.        Vitals:    05/28/20 1314   Temp: 97 °F (36.1 °C)       Physical Exam   Constitutional: She is oriented to person, place, and time. She appears well-developed and well-nourished.   HENT:   Head: Normocephalic.   Mouth/Throat: Oropharynx is clear and moist.   Eyes: Conjunctivae and EOM are normal.   Neck: Normal range of motion.   Cardiovascular: Normal rate and regular rhythm.   Pulmonary/Chest: Breath sounds normal.   Abdominal: Soft. Bowel sounds are normal.   Musculoskeletal: Normal range of motion.   Neurological: She is alert and oriented to person, place, and time.   Skin: Skin is warm and dry.   Psychiatric:  She has a normal mood and affect. Her behavior is normal.       ASSESSMENT  #1 abdominal pain: Possible association with H. pylori infection  #2 history of positive H. pylori: Treated, not clear if eradicated  #3 nausea: May be associated with GI issues, cannot rule out the possibility of medication reaction from antihypertensives  #4 history of polyps: Due for follow-up colonoscopy in 2021      PLAN  H. pylori breath test  Continue PPI  We will call patient with results and further treatment options      ICD-10-CM ICD-9-CM   1. Abdominal pain, unspecified abdominal location R10.9 789.00   2. Nausea R11.0 787.02   3. Positive Helicobacter pylori test A04.8 041.86

## 2020-05-29 ENCOUNTER — READMISSION MANAGEMENT (OUTPATIENT)
Dept: CALL CENTER | Facility: HOSPITAL | Age: 65
End: 2020-05-29

## 2020-05-29 ENCOUNTER — TELEPHONE (OUTPATIENT)
Dept: GASTROENTEROLOGY | Facility: CLINIC | Age: 65
End: 2020-05-29

## 2020-05-29 DIAGNOSIS — A04.8 POSITIVE HELICOBACTER PYLORI TEST: Primary | ICD-10-CM

## 2020-05-29 NOTE — TELEPHONE ENCOUNTER
----- Message from Inder Gonzales sent at 5/29/2020  2:31 PM EDT -----  Regarding: Refill   Contact: [phone]  Patient had an appointment yesterday with Lorri and states that he wants her to stay on her Pantoprazole. She forgot it was originally prescribed by the hospital and she will need a new prescription called in if Lorri would like for her to stay on this medication.     [facility] [address] [place] [address]   [id number] [place] [address] -   Phone: [phone]    Fax: [phone]

## 2020-05-29 NOTE — TELEPHONE ENCOUNTER
Okay to call in prescription for pantoprazole 40 mg daily but advised the patient to hold until after H. pylori breath test has been completed.

## 2020-05-29 NOTE — OUTREACH NOTE
Medical Week 4 Survey      Responses   [facility] patient discharged from?  [place]   COVID-19 Test Status  Not tested   Does the patient have one of the following disease processes/diagnoses(primary or secondary)?  Other   Week 4 attempt successful?  Yes   Call start time  1308   Call end time  1316   Discharge diagnosis   Nausea,   Unintentional weight loss,   Hyponatremia,   Hypokalemia   Meds reviewed with patient/caregiver?  Yes   Is the patient taking all medications as directed (includes completed medication regime)?  Yes   Medication comments  B/P medication changed to Labetolol   Has the patient kept scheduled appointments due by today?  Yes   Comments  GI 05/28/2020   Is the patient still receiving Home Health Services?  N/A   Pulse Ox monitoring  None   Psychosocial comments  B/P variable usually ok during day, will be elevated at night.   What is the patient's perception of their health status since discharge?  Improving   Additional teach back comments   B/P 159/95 last night   just a litle while ago B/P was 124/80   Week 4 Call Completed?  Yes   Would the patient like one additional call?  No   Graduated  Yes   Did the patient feel the follow up calls were helpful during their recovery period?  Yes   Was the number of calls appropriate?  Yes          Judi Monge RN

## 2020-06-16 ENCOUNTER — LAB (OUTPATIENT)
Dept: GASTROENTEROLOGY | Facility: CLINIC | Age: 65
End: 2020-06-16

## 2020-06-17 ENCOUNTER — OFFICE VISIT (OUTPATIENT)
Dept: CARDIOLOGY | Facility: CLINIC | Age: 65
End: 2020-06-17

## 2020-06-17 VITALS
SYSTOLIC BLOOD PRESSURE: 134 MMHG | HEART RATE: 57 BPM | HEIGHT: 63 IN | WEIGHT: 208 LBS | DIASTOLIC BLOOD PRESSURE: 88 MMHG | BODY MASS INDEX: 36.86 KG/M2

## 2020-06-17 DIAGNOSIS — E78.5 DYSLIPIDEMIA: ICD-10-CM

## 2020-06-17 DIAGNOSIS — F41.9 ANXIETY: ICD-10-CM

## 2020-06-17 DIAGNOSIS — I10 ESSENTIAL HYPERTENSION: Primary | ICD-10-CM

## 2020-06-17 DIAGNOSIS — Z98.890 HISTORY OF CARDIAC CATHETERIZATION: ICD-10-CM

## 2020-06-17 DIAGNOSIS — Z82.49 FAMILY HISTORY OF HEART DISEASE: ICD-10-CM

## 2020-06-17 DIAGNOSIS — G47.33 OSA (OBSTRUCTIVE SLEEP APNEA): ICD-10-CM

## 2020-06-17 DIAGNOSIS — I51.89 DIASTOLIC DYSFUNCTION: ICD-10-CM

## 2020-06-17 LAB — UREA BREATH TEST QL: NEGATIVE

## 2020-06-17 PROCEDURE — 93000 ELECTROCARDIOGRAM COMPLETE: CPT | Performed by: INTERNAL MEDICINE

## 2020-06-17 PROCEDURE — 99204 OFFICE O/P NEW MOD 45 MIN: CPT | Performed by: INTERNAL MEDICINE

## 2020-06-17 RX ORDER — LORAZEPAM 0.5 MG/1
1 TABLET ORAL NIGHTLY PRN
COMMUNITY
Start: 2020-05-22 | End: 2020-10-01

## 2020-06-17 RX ORDER — BUPROPION HYDROCHLORIDE 150 MG/1
150 TABLET ORAL DAILY
COMMUNITY
Start: 2020-05-26 | End: 2020-10-01

## 2020-06-17 RX ORDER — NEBIVOLOL 20 MG/1
20 TABLET ORAL DAILY
COMMUNITY
End: 2020-11-02 | Stop reason: SDUPTHER

## 2020-06-17 RX ORDER — MELATONIN
1000 DAILY
COMMUNITY

## 2020-06-17 RX ORDER — ONDANSETRON 4 MG/1
TABLET, FILM COATED ORAL
COMMUNITY
Start: 2020-04-01 | End: 2020-10-01

## 2020-06-17 RX ORDER — ESTRADIOL 0.1 MG/G
2 CREAM VAGINAL DAILY
COMMUNITY
End: 2022-05-04

## 2020-06-17 RX ORDER — CLOBETASOL PROPIONATE 0.5 MG/G
CREAM TOPICAL 2 TIMES DAILY
COMMUNITY
End: 2022-07-13

## 2020-06-17 RX ORDER — AMLODIPINE BESYLATE 5 MG/1
5 TABLET ORAL DAILY
Qty: 90 TABLET | Refills: 3 | Status: SHIPPED | OUTPATIENT
Start: 2020-06-17 | End: 2021-06-09

## 2020-06-17 NOTE — PROGRESS NOTES
Subjective:     Encounter Date:06/17/2020      Patient ID: Kelle Soliz is a 65 y.o. female.    Chief Complaint:  Chief Complaint   Patient presents with   • Hypertension       HPI:  History of Present Illness     I saw Ms. Soliz in the office today.  She is a very pleasant 65-year-old female who presents to the office to establish cardiovascular care.  She has a longstanding history of essential hypertension.  She has a history of dyslipidemia.  She has a history of prediabetes.  She has obstructive sleep apnea has not used her CPAP for over a year.  She has a positive family history of premature coronary disease in first-degree relatives.  She is overweight.  She does have a history of anxiety.    Ms. Soliz states that she had been on Edarbyclor and Bystolic for the past 3 years with adequate control of her blood pressure.  She states she went through some medical issues a few months ago and ultimately required admission to the hospital.  She was having gastrointestinal issues and was diagnosed with gastritis and H. pylori.  During her hospital stay she was noted to be hypokalemic and hyponatremic.  She had been experiencing decreased appetite, weight loss and diarrhea.  Her Edarbyclor was discontinued and she was placed on losartan.  The losartan was gradually titrated up to 100 mg but her blood pressure was not well controlled.  She was then placed on Edarbi and labetalol.  She had some perceived intolerances to labetalol and was switched back to Bystolic.  Her blood pressure has not been adequately controlled.  She did bring in a chart with readings and most of them are in the 140-150 systolic range.    Ms. Soliz is not complaining of chest discomfort or shortness of air.  She does not have lower extremity edema, orthopnea or paroxysmal nocturnal dyspnea.  She states that she has had palpitations intermittently over the years.  She did have a cardiac catheterization in 2010 which, per her report  demonstrated normal coronary arteries.  She had an echocardiogram in  which demonstrated normal ejection fraction.  Her wall thickness was 1.3 cm and she had grade 2 diastolic dysfunction.    The following portions of the patient's history were reviewed and updated as appropriate: allergies, current medications, past family history, past medical history, past social history, past surgical history and problem list.    Problem List:  Patient Active Problem List   Diagnosis   • Hypersomnia with sleep apnea   • Snoring   • Hx of colonic polyps   • Chest pain   • DJD (degenerative joint disease) of knee   • Hyponatremia   • Hypokalemia   • GERD without esophagitis   • DELIA (obstructive sleep apnea)   • Nausea   • Loss of appetite   • Essential hypertension   • Dyslipidemia   • Family history of heart disease   • Anxiety   • History of cardiac catheterization   • Diastolic dysfunction       Past Medical History:  Past Medical History:   Diagnosis Date   • Anemia in mid-40s   • Arthritis    • Cholelithiasis around    • Colon polyp     several each colonoscopy, last 10 years   • Diabetes mellitus (CMS/HCC)     pre-diabetic, for several years   • Diastolic dysfunction    • Disease of thyroid gland    • Diverticulosis    • Diverticulosis    • Fatty liver     slightly fatty   • GERD (gastroesophageal reflux disease)    • H. pylori infection    • Heart murmur    • Hemorrhoid    • Hernia     slight hiatal hernia   • Hx of colonic polyp    • Hyperlipidemia    • Hypertension    • Hypokalemia    • Hyponatremia    • Pericardial effusion    • PSVT (paroxysmal supraventricular tachycardia) (CMS/HCC)    • Sleep apnea     cpap - does not use       Past Surgical History:  Past Surgical History:   Procedure Laterality Date   • ABDOMINAL SURGERY  around     gallbladder (~), ovaries removed (~)   • CARDIAC CATHETERIZATION     •  SECTION      x2   • COLONOSCOPY N/A 3/6/2018    Procedure: COLONOSCOPY to  terminal ileum with polypectomy (cold);  Surgeon: Eddie ELIZABETH MD;  Location: [facility] ENDOSCOPY;  Service:    • COLONOSCOPY W/ POLYPECTOMY     • ENDOSCOPY N/A 5/1/2020    Procedure: ESOPHAGOGASTRODUODENOSCOPYWITH COLD BIOPSIES;  Surgeon: Kirby Dee MD;  Location: [facility] ENDOSCOPY;  Service: Gastroenterology;  Laterality: N/A;  PRE: NAUSEA  AND VOMITTING  POST: GASTRITIS   • FOOT ARTHRODESIS, MODIFIED KHAN Left    • KNEE ARTHROPLASTY Right    • LAPAROSCOPIC CHOLECYSTECTOMY     • LAPAROSCOPIC OOPHORECTOMY Bilateral    • TOTAL KNEE ARTHROPLASTY Left 3/28/2019    Procedure: LEFT TOTAL KNEE ARTHROPLASTY;  Surgeon: Neymar Hendricks MD;  Location: [facility] MAIN OR;  Service: Orthopedics       Social History:  Social History     Socioeconomic History   • Marital status:      Spouse name: Not on file   • Number of children: Not on file   • Years of education: Not on file   • Highest education level: Not on file   Tobacco Use   • Smoking status: Never Smoker   • Smokeless tobacco: Never Used   Substance and Sexual Activity   • Alcohol use: No   • Drug use: No   • Sexual activity: Defer     Partners: Male       Allergies:  No Known Allergies    Immunizations:    There is no immunization history on file for this patient.    ROS:  Review of Systems   Constitution: Negative for chills, decreased appetite, fever, malaise/fatigue, weight gain and weight loss.   HENT: Negative for congestion, hoarse voice, nosebleeds and sore throat.    Eyes: Negative for blurred vision, double vision and visual disturbance.   Cardiovascular: Positive for palpitations ( Rare). Negative for chest pain, claudication, dyspnea on exertion, irregular heartbeat, leg swelling, near-syncope, orthopnea, paroxysmal nocturnal dyspnea and syncope.   Respiratory: Negative for cough, hemoptysis, shortness of breath, sleep disturbances due to breathing, snoring, sputum production and wheezing.    Endocrine: Negative for cold  "intolerance, heat intolerance, polydipsia and polyuria.   Hematologic/Lymphatic: Negative for adenopathy and bleeding problem. Does not bruise/bleed easily.   Skin: Negative for flushing, itching, nail changes and rash.   Musculoskeletal: Negative for arthritis, back pain, joint pain, muscle cramps, muscle weakness, myalgias and neck pain.   Gastrointestinal: Positive for abdominal pain and constipation. Negative for bloating, anorexia, change in bowel habit, diarrhea, heartburn, hematemesis, hematochezia, jaundice, melena, nausea and vomiting.   Genitourinary: Negative for dysuria, hematuria and nocturia.   Neurological: Negative for brief paralysis, disturbances in coordination, excessive daytime sleepiness, dizziness, headaches, light-headedness, loss of balance, numbness, paresthesias, seizures and vertigo.   Psychiatric/Behavioral: Negative for altered mental status and depression. The patient is nervous/anxious.    Allergic/Immunologic: Negative for environmental allergies and hives.          Objective:         /88   Pulse 57   Ht 160 cm (63\")   Wt 94.3 kg (208 lb)   BMI 36.85 kg/m²     Physical Exam   Constitutional: She is oriented to person, place, and time. She appears well-developed and well-nourished. No distress.   HENT:   Head: Normocephalic and atraumatic.   Mouth/Throat: Oropharynx is clear and moist.   Eyes: Pupils are equal, round, and reactive to light. Conjunctivae and EOM are normal. No scleral icterus.   Neck: Normal range of motion. Neck supple. No thyromegaly present.   Cardiovascular: Regular rhythm, S1 normal, S2 normal and intact distal pulses.  No extrasystoles are present. Bradycardia present. PMI is not displaced. Exam reveals no gallop, no S3, no S4, no friction rub and no decreased pulses.   No murmur heard.  Pulses:       Carotid pulses are 2+ on the right side, and 2+ on the left side.       Radial pulses are 2+ on the right side, and 2+ on the left side.        Femoral " pulses are 2+ on the right side, and 2+ on the left side.       Dorsalis pedis pulses are 2+ on the right side, and 2+ on the left side.        Posterior tibial pulses are 2+ on the right side, and 2+ on the left side.   Pulmonary/Chest: Effort normal and breath sounds normal. No respiratory distress. She has no wheezes. She has no rales.   Abdominal: Soft. Bowel sounds are normal. She exhibits no distension and no mass. There is no tenderness. There is no rebound and no guarding.   Musculoskeletal: Normal range of motion. She exhibits no edema.   Lymphadenopathy:     She has no cervical adenopathy.   Neurological: She is alert and oriented to person, place, and time. Coordination normal.   Skin: Skin is warm and dry. No rash noted. She is not diaphoretic. No pallor.   Psychiatric: She has a normal mood and affect. Her behavior is normal.   Nursing note and vitals reviewed.      In-Office Procedure(s):    ECG 12 Lead  Date/Time: 6/17/2020 9:38 AM  Performed by: Regla Eagle MD  Authorized by: Regla Eagle MD   Comparison: compared with previous ECG from 4/25/2020  Comparison to previous ECG: Normal sinus rhythm.  Left atrial abnormality.  Poor anterior R wave progression  Comments: Sinus bradycardia, rate 53.  Left atrial abnormality.  Poor anterior R wave progression            ASCVD RIsk Score::  The ASCVD Risk score (C[place] DC Jr., et al., 2013) failed to calculate for the following reasons:    Cannot find a previous HDL lab    Cannot find a previous total cholesterol lab    Recent Radiology:  Imaging Results (Most Recent)     None          Lab Review:   not applicable             Assessment:          Diagnosis Plan   1. Essential hypertension  amLODIPine (NORVASC) 5 MG tablet    Adult Transthoracic Echo Complete W/ Cont if Necessary Per Protocol   2. Dyslipidemia     3. DELIA (obstructive sleep apnea)     4. Family history of heart disease     5. Anxiety     6. History of cardiac catheterization     7.  Diastolic dysfunction            Plan:      1.  Essential hypertension  She is currently on Edarbi 80 mg daily and Bystolic 40 mg daily.  Her baseline heart rate is in the 50s but does increase when she moves around.  Blood pressure in the office is borderline at 134/88 but the readings that she brings in from home demonstrate continued elevation of her blood pressure.  Her goal is for her blood pressure to be less than 130/80.  I did discuss lifestyle modification with her which included reduction of intake of sodium, exercise, weight loss and use of her CPAP.  I did recommend the DASH diet.  She states that she will look this up.  In addition to lifestyle modification, I have added amlodipine 5 mg daily.  Hopefully this will bring her blood pressure to within goal and if it lowers her blood pressure adequately, perhaps we can lower her Bystolic dose to allow her heart rate to increase.    2.  Dyslipidemia  She is on rosuvastatin 40 mg daily.  I will obtain a copy of her most recent lipid profile.  She is tolerating the medication well.    3.  Obstructive sleep apnea  I have strongly encouraged her to use her CPAP and I have discussed some of the negative long-term side effects of untreated sleep apnea.  She does state that she is fatigued.  Untreated sleep apnea is certainly a cause of fatigue.    4.  Family history of premature cardiac disease  Her mother was diagnosed with a myocardial infarction at age 61 and her brother at age 55.    5.  Anxiety  Ms. Verma states that she does have anxiety.    6.  Diastolic dysfunction  She had an echocardiogram in 2014 which demonstrated grade 2 diastolic dysfunction.  I would like to repeat an echocardiogram to see if there has been progression of her diastolic parameters.    Her EKG today demonstrates sinus bradycardia with a heart rate of 53 bpm.  She had left atrial abnormality and poor anterior R wave progression.  Level of Care:                 Regla Eagle,  MD  06/17/20  .

## 2020-06-22 ENCOUNTER — HOSPITAL ENCOUNTER (OUTPATIENT)
Dept: CARDIOLOGY | Facility: HOSPITAL | Age: 65
Discharge: HOME | End: 2020-06-22
Admitting: INTERNAL MEDICINE

## 2020-06-22 DIAGNOSIS — I10 ESSENTIAL HYPERTENSION: ICD-10-CM

## 2020-06-22 LAB
AORTIC DIMENSIONLESS INDEX: 0.8 (DI)
BH CV ECHO MEAS - AO MAX PG (FULL): 4.1 MMHG
BH CV ECHO MEAS - AO MAX PG: 7.7 MMHG
BH CV ECHO MEAS - AO MEAN PG (FULL): 2 MMHG
BH CV ECHO MEAS - AO MEAN PG: 4 MMHG
BH CV ECHO MEAS - AO ROOT AREA (BSA CORRECTED): 1.4
BH CV ECHO MEAS - AO ROOT AREA: 5.7 CM^2
BH CV ECHO MEAS - AO ROOT DIAM: 2.7 CM
BH CV ECHO MEAS - AO V2 MAX: 139 CM/SEC
BH CV ECHO MEAS - AO V2 MEAN: 93.8 CM/SEC
BH CV ECHO MEAS - AO V2 VTI: 32.8 CM
BH CV ECHO MEAS - ASC AORTA: 3.4 CM
BH CV ECHO MEAS - AVA(I,A): 2.2 CM^2
BH CV ECHO MEAS - AVA(I,D): 2.2 CM^2
BH CV ECHO MEAS - AVA(V,A): 1.9 CM^2
BH CV ECHO MEAS - AVA(V,D): 1.9 CM^2
BH CV ECHO MEAS - BSA(HAYCOCK): 2.1 M^2
BH CV ECHO MEAS - BSA: 2 M^2
BH CV ECHO MEAS - BZI_BMI: 36.8 KILOGRAMS/M^2
BH CV ECHO MEAS - BZI_METRIC_HEIGHT: 160 CM
BH CV ECHO MEAS - BZI_METRIC_WEIGHT: 94.3 KG
BH CV ECHO MEAS - EDV(CUBED): 110.6 ML
BH CV ECHO MEAS - EDV(MOD-SP2): 84 ML
BH CV ECHO MEAS - EDV(MOD-SP4): 89 ML
BH CV ECHO MEAS - EDV(TEICH): 107.5 ML
BH CV ECHO MEAS - EF(CUBED): 80.2 %
BH CV ECHO MEAS - EF(MOD-BP): 62.5 %
BH CV ECHO MEAS - EF(MOD-SP2): 65.5 %
BH CV ECHO MEAS - EF(MOD-SP4): 58.4 %
BH CV ECHO MEAS - EF(TEICH): 72.5 %
BH CV ECHO MEAS - ESV(CUBED): 22 ML
BH CV ECHO MEAS - ESV(MOD-SP2): 29 ML
BH CV ECHO MEAS - ESV(MOD-SP4): 37 ML
BH CV ECHO MEAS - ESV(TEICH): 29.6 ML
BH CV ECHO MEAS - FS: 41.7 %
BH CV ECHO MEAS - IVS/LVPW: 0.9
BH CV ECHO MEAS - IVSD: 0.9 CM
BH CV ECHO MEAS - LAT PEAK E' VEL: 5.8 CM/SEC
BH CV ECHO MEAS - LV DIASTOLIC VOL/BSA (35-75): 45.3 ML/M^2
BH CV ECHO MEAS - LV MASS(C)D: 158.8 GRAMS
BH CV ECHO MEAS - LV MASS(C)DI: 80.8 GRAMS/M^2
BH CV ECHO MEAS - LV MAX PG: 3.6 MMHG
BH CV ECHO MEAS - LV MEAN PG: 2 MMHG
BH CV ECHO MEAS - LV SYSTOLIC VOL/BSA (12-30): 18.8 ML/M^2
BH CV ECHO MEAS - LV V1 MAX: 94.6 CM/SEC
BH CV ECHO MEAS - LV V1 MEAN: 73.9 CM/SEC
BH CV ECHO MEAS - LV V1 VTI: 26 CM
BH CV ECHO MEAS - LVIDD: 4.8 CM
BH CV ECHO MEAS - LVIDS: 2.8 CM
BH CV ECHO MEAS - LVLD AP2: 7.2 CM
BH CV ECHO MEAS - LVLD AP4: 7 CM
BH CV ECHO MEAS - LVLS AP2: 6 CM
BH CV ECHO MEAS - LVLS AP4: 6 CM
BH CV ECHO MEAS - LVOT AREA (M): 2.8 CM^2
BH CV ECHO MEAS - LVOT AREA: 2.8 CM^2
BH CV ECHO MEAS - LVOT DIAM: 1.9 CM
BH CV ECHO MEAS - LVPWD: 1 CM
BH CV ECHO MEAS - MED PEAK E' VEL: 6.5 CM/SEC
BH CV ECHO MEAS - MV A MAX VEL: 89.7 CM/SEC
BH CV ECHO MEAS - MV DEC SLOPE: 444 CM/SEC^2
BH CV ECHO MEAS - MV DEC TIME: 190 SEC
BH CV ECHO MEAS - MV E MAX VEL: 112 CM/SEC
BH CV ECHO MEAS - MV E/A: 1.2
BH CV ECHO MEAS - MV MAX PG: 5.9 MMHG
BH CV ECHO MEAS - MV MEAN PG: 2 MMHG
BH CV ECHO MEAS - MV P1/2T MAX VEL: 119 CM/SEC
BH CV ECHO MEAS - MV P1/2T: 78.5 MSEC
BH CV ECHO MEAS - MV V2 MAX: 121 CM/SEC
BH CV ECHO MEAS - MV V2 MEAN: 60.8 CM/SEC
BH CV ECHO MEAS - MV V2 VTI: 38 CM
BH CV ECHO MEAS - MVA P1/2T LCG: 1.8 CM^2
BH CV ECHO MEAS - MVA(P1/2T): 2.8 CM^2
BH CV ECHO MEAS - MVA(VTI): 1.9 CM^2
BH CV ECHO MEAS - RAP SYSTOLE: 3 MMHG
BH CV ECHO MEAS - RV BASE (<4.1) - OBSOLETE: 3.4 CM
BH CV ECHO MEAS - RV LENGTH (<8.5) - OBSOLETE: 7 CM
BH CV ECHO MEAS - SI(AO): 95.5 ML/M^2
BH CV ECHO MEAS - SI(CUBED): 45.1 ML/M^2
BH CV ECHO MEAS - SI(LVOT): 37.5 ML/M^2
BH CV ECHO MEAS - SI(MOD-SP2): 28 ML/M^2
BH CV ECHO MEAS - SI(MOD-SP4): 26.4 ML/M^2
BH CV ECHO MEAS - SI(TEICH): 39.7 ML/M^2
BH CV ECHO MEAS - SV(AO): 187.8 ML
BH CV ECHO MEAS - SV(CUBED): 88.6 ML
BH CV ECHO MEAS - SV(LVOT): 73.7 ML
BH CV ECHO MEAS - SV(MOD-SP2): 55 ML
BH CV ECHO MEAS - SV(MOD-SP4): 52 ML
BH CV ECHO MEAS - SV(TEICH): 78 ML
BH CV ECHO MEAS - TAPSE (>1.6): 2.4 CM
BH CV ECHO MEASUREMENTS AVERAGE E/E' RATIO: 18.21
BH CV XLRA - RV BASE: 3.4 CM
BH CV XLRA - RV LENGTH: 7 CM
BH CV XLRA - RV MID: 3 CM
BH CV XLRA - TDI S': 12 CM/SEC
LEFT ATRIUM VOLUME INDEX: 27 ML/M2

## 2020-06-22 PROCEDURE — 93306 TTE W/DOPPLER COMPLETE: CPT

## 2020-06-22 PROCEDURE — 93306 TTE W/DOPPLER COMPLETE: CPT | Performed by: INTERNAL MEDICINE

## 2020-06-26 ENCOUNTER — TELEPHONE (OUTPATIENT)
Dept: GASTROENTEROLOGY | Facility: CLINIC | Age: 65
End: 2020-06-26

## 2020-06-26 NOTE — TELEPHONE ENCOUNTER
Okay to resume pantoprazole for treatment of gastritis and reflux, would start with 40 mg daily per Dr Blackmon.      Called pt and left vm for pt to call back.

## 2020-06-26 NOTE — TELEPHONE ENCOUNTER
----- Message from Inder Choudhury sent at 6/26/2020  1:39 PM EDT -----  Regarding: pantoprazole (PROTONIX) 40 MG   Contact: [phone]  Pt is calling to see if she needs to continue on the protonix.

## 2020-06-29 ENCOUNTER — TELEPHONE (OUTPATIENT)
Dept: CARDIOLOGY | Facility: CLINIC | Age: 65
End: 2020-06-29

## 2020-07-01 NOTE — TELEPHONE ENCOUNTER
I spoke with the patient and gave her echo results. She forgot to tell you that a year before her mother  she started Verapamil, she then developed ILD & they told her it could have been from the Verapamil, she then firmly believed that is what caused her death. Pt states that she just wanted to let you know, so you would never put her on this medication and was concerned with being on Amlodipine, since they are both calcium channel blockers.

## 2020-07-02 RX ORDER — PANTOPRAZOLE SODIUM 40 MG/1
40 TABLET, DELAYED RELEASE ORAL DAILY
Qty: 90 TABLET | Refills: 0 | Status: SHIPPED | OUTPATIENT
Start: 2020-07-02 | End: 2020-10-01

## 2020-07-08 ENCOUNTER — PRIOR AUTHORIZATION (OUTPATIENT)
Dept: GASTROENTEROLOGY | Facility: CLINIC | Age: 65
End: 2020-07-08

## 2020-07-08 NOTE — TELEPHONE ENCOUNTER
PA submitted through [facility] for Pantoprazole Sodium 40MG dr tablets.  Approved through 07/08/2021.  Letter is in media

## 2020-07-16 ENCOUNTER — TELEPHONE (OUTPATIENT)
Dept: CARDIOLOGY | Facility: CLINIC | Age: 65
End: 2020-07-16

## 2020-07-16 NOTE — TELEPHONE ENCOUNTER
CPA PT    B/P Reading  7/5/20  Am 117/70   Pm  139/76  7/6/20  Am  114/68  Pm  120/71  7/9/20 am    107/62  Pm  121/69  7/11/20  Am  117/68    7/12/20                        Pm 111/65  7/13/20  Am 117/64      7/14/20  Am   114/72     7/15/20  Am 127/70    Pulse range am 50-56                    Pm   53-56      PT cell [phone]  PT   [phone]

## 2020-07-17 NOTE — TELEPHONE ENCOUNTER
Pt states that you told her that if the Amlodipine brought her bp down that she could lower her Bystolic, so her pulse would go in the 50's. She is currently taking 40 mg of Bystolic. dmk

## 2020-10-01 ENCOUNTER — OFFICE VISIT (OUTPATIENT)
Dept: CARDIOLOGY | Facility: CLINIC | Age: 65
End: 2020-10-01

## 2020-10-01 VITALS
HEART RATE: 56 BPM | BODY MASS INDEX: 39.16 KG/M2 | WEIGHT: 221 LBS | HEIGHT: 63 IN | SYSTOLIC BLOOD PRESSURE: 130 MMHG | RESPIRATION RATE: 20 BRPM | OXYGEN SATURATION: 97 % | DIASTOLIC BLOOD PRESSURE: 82 MMHG

## 2020-10-01 DIAGNOSIS — E78.5 DYSLIPIDEMIA: ICD-10-CM

## 2020-10-01 DIAGNOSIS — I51.89 DIASTOLIC DYSFUNCTION: ICD-10-CM

## 2020-10-01 DIAGNOSIS — I10 ESSENTIAL HYPERTENSION: Primary | ICD-10-CM

## 2020-10-01 DIAGNOSIS — G47.33 OSA (OBSTRUCTIVE SLEEP APNEA): ICD-10-CM

## 2020-10-01 PROCEDURE — 99214 OFFICE O/P EST MOD 30 MIN: CPT | Performed by: INTERNAL MEDICINE

## 2020-10-01 RX ORDER — LEVOTHYROXINE SODIUM 88 UG/1
88 TABLET ORAL DAILY
COMMUNITY

## 2020-10-01 NOTE — PROGRESS NOTES
Subjective:     Encounter Date:10/01/2020      Patient ID: Kelle Soliz is a 65 y.o. female.    Chief Complaint:  Chief Complaint   Patient presents with   • Hypertension   • Hyperlipidemia   • Sleep Apnea       HPI:  History of Present Illness     I had the pleasure of seeing Ms. Soliz in the office today.  She is a pleasant 65-year-old female with a history of hypertension, dyslipidemia, prediabetes, obstructive sleep apnea, anxiety, obesity and a history of premature coronary disease.  She had an echocardiogram in June 2020 which demonstrated normal left ventricular function.  She had grade 2 diastolic dysfunction.  She had mild mitral insufficiency and trace to mild tricuspid insufficiency.  She did have a trace to small pericardial effusion.    On her last visit she was having issues with her blood pressure.  I did make some adjustments to her antihypertensive regimen by adding amlodipine 5 mg daily.  She also decreased her Bystolic from 40 mg to 20 mg daily.  She is in the office today and states that she is feeling well.  She did bring in some readings of her blood pressure since the medication adjustment.  Her blood pressures have been within normal limits.  She does remain bradycardic and I am going to try to decrease her Bystolic further.  She is not complaining of chest discomfort or shortness of air.  She denies palpitations, dizziness or near syncope.  No lower extremity edema, orthopnea or paroxysmal nocturnal dyspnea.    The following portions of the patient's history were reviewed and updated as appropriate: allergies, current medications, past family history, past medical history, past social history, past surgical history and problem list.    Problem List:  Patient Active Problem List   Diagnosis   • Hypersomnia with sleep apnea   • Snoring   • Hx of colonic polyps   • Chest pain   • DJD (degenerative joint disease) of knee   • Hyponatremia   • Hypokalemia   • GERD without esophagitis   •  DELIA (obstructive sleep apnea)   • Nausea   • Loss of appetite   • Essential hypertension   • Dyslipidemia   • Family history of heart disease   • Anxiety   • History of cardiac catheterization   • Diastolic dysfunction       Past Medical History:  Past Medical History:   Diagnosis Date   • Anemia in mid-40s   • Arthritis    • Cholelithiasis around    • Colon polyp     several each colonoscopy, last 10 years   • Diabetes mellitus (CMS/HCC)     pre-diabetic, for several years   • Diastolic dysfunction    • Disease of thyroid gland    • Diverticulosis    • Diverticulosis    • Fatty liver     slightly fatty   • GERD (gastroesophageal reflux disease)    • H. pylori infection    • Heart murmur    • Hemorrhoid    • Hernia     slight hiatal hernia   • Hx of colonic polyp    • Hyperlipidemia    • Hypertension    • Hypokalemia    • Hyponatremia    • Pericardial effusion    • PSVT (paroxysmal supraventricular tachycardia) (CMS/HCC)    • Sleep apnea     cpap - does not use       Past Surgical History:  Past Surgical History:   Procedure Laterality Date   • ABDOMINAL SURGERY  around     gallbladder (~), ovaries removed (~)   • CARDIAC CATHETERIZATION     •  SECTION      x2   • COLONOSCOPY N/A 3/6/2018    Procedure: COLONOSCOPY to terminal ileum with polypectomy (cold);  Surgeon: Eddie ELIZABETH MD;  Location: [facility] ENDOSCOPY;  Service:    • COLONOSCOPY W/ POLYPECTOMY     • ENDOSCOPY N/A 2020    Procedure: ESOPHAGOGASTRODUODENOSCOPYWITH COLD BIOPSIES;  Surgeon: Kirby Dee MD;  Location: [facility] ENDOSCOPY;  Service: Gastroenterology;  Laterality: N/A;  PRE: NAUSEA  AND VOMITTING  POST: GASTRITIS   • FOOT ARTHRODESIS, MODIFIED KHAN Left    • KNEE ARTHROPLASTY Right    • LAPAROSCOPIC CHOLECYSTECTOMY     • LAPAROSCOPIC OOPHORECTOMY Bilateral    • TOTAL KNEE ARTHROPLASTY Left 3/28/2019    Procedure: LEFT TOTAL KNEE ARTHROPLASTY;  Surgeon: Neymar Hendricks MD;  Location: [facility]  OR;  Service: Orthopedics       Social History:  Social History     Socioeconomic History   • Marital status:      Spouse name: Not on file   • Number of children: Not on file   • Years of education: Not on file   • Highest education level: Not on file   Tobacco Use   • Smoking status: Never Smoker   • Smokeless tobacco: Never Used   Substance and Sexual Activity   • Alcohol use: No   • Drug use: No   • Sexual activity: Defer     Partners: Male       Allergies:  No Known Allergies    Immunizations:    There is no immunization history on file for this patient.    ROS:  Review of Systems   Constitution: Negative for chills, decreased appetite, fever, malaise/fatigue, weight gain and weight loss.   HENT: Negative for congestion, hoarse voice, nosebleeds and sore throat.    Eyes: Negative for blurred vision, double vision and visual disturbance.   Cardiovascular: Negative for chest pain, claudication, dyspnea on exertion, irregular heartbeat, leg swelling, near-syncope, orthopnea, palpitations, paroxysmal nocturnal dyspnea and syncope.   Respiratory: Negative for cough, hemoptysis, shortness of breath, sleep disturbances due to breathing, snoring, sputum production and wheezing.    Endocrine: Negative for cold intolerance, heat intolerance, polydipsia and polyuria.   Hematologic/Lymphatic: Negative for adenopathy and bleeding problem. Does not bruise/bleed easily.   Skin: Negative for flushing, itching, nail changes and rash.   Musculoskeletal: Negative for arthritis, back pain, joint pain, muscle cramps, muscle weakness, myalgias and neck pain.   Gastrointestinal: Negative for bloating, abdominal pain, anorexia, change in bowel habit, constipation, diarrhea, heartburn, hematemesis, hematochezia, jaundice, melena, nausea and vomiting.   Genitourinary: Negative for dysuria, hematuria and nocturia.   Neurological: Negative for brief paralysis, disturbances in coordination, excessive daytime sleepiness, dizziness,  "headaches, light-headedness, loss of balance, numbness, paresthesias, seizures and vertigo.   Psychiatric/Behavioral: Negative for altered mental status and depression. The patient is not nervous/anxious.    Allergic/Immunologic: Negative for environmental allergies and hives.          Objective:         /82 (BP Location: Left arm, Patient Position: Sitting, Cuff Size: Large Adult)   Pulse 56   Resp 20   Ht 160 cm (63\")   Wt 100 kg (221 lb)   SpO2 97%   BMI 39.15 kg/m²     Constitutional:       Appearance: Healthy appearance. Not in distress.   HENT:    Mouth/Throat:      Pharynx: Oropharynx is clear.   Neck:      Musculoskeletal: Neck supple.      Vascular: No JVR. JVD normal.   Pulmonary:      Effort: Pulmonary effort is normal.      Breath sounds: Normal breath sounds. No wheezing. No rhonchi. No rales.   Chest:      Chest wall: Not tender to palpatation.   Cardiovascular:      PMI at left midclavicular line. Normal rate. Regular rhythm. Normal S1. Normal S2.      Murmurs: There is no murmur.      No gallop. No click. No rub.   Pulses:     Intact distal pulses.   Edema:     Peripheral edema absent.   Abdominal:      General: Bowel sounds are normal. There is no distension.      Palpations: Abdomen is soft. There is no hepatomegaly.      Tenderness: There is no abdominal tenderness.   Musculoskeletal: Normal range of motion.         General: No tenderness.   Skin:     General: Skin is warm and dry.   Neurological:      General: No focal deficit present.      Mental Status: Alert and oriented to person, place and time.         In-Office Procedure(s):  Procedures    ASCVD RIsk Score::  The ASCVD Risk score (Long PETR Jr., et al., 2013) failed to calculate for the following reasons:    Cannot find a previous HDL lab    Cannot find a previous total cholesterol lab    Recent Radiology:  Imaging Results (Most Recent)     None          Lab Review:   not applicable             Assessment:          Diagnosis Plan "   1. Essential hypertension     2. Diastolic dysfunction     3. DELIA (obstructive sleep apnea)     4. Dyslipidemia            Plan:      1.  Essential hypertension  Her blood pressures currently well controlled on her new regimen.  I am going to try her on 10 mg of Bystolic to allow her heart rate to increase.  I did give her some samples today    2.  Diastolic dysfunction  Most recent echocardiogram with grade 2 diastolic dysfunction which is stable compared to her previous echocardiogram    3.  Obstructive sleep apnea  Encouraged to wear her mask    4.  Dyslipidemia  She is on rosuvastatin 40 mg daily.  I do not see a lipid profile in her most recent lab work.  Will obtain one on her next visit.      Level of Care:                 Regla Eagle MD  10/01/20  .

## 2020-10-29 ENCOUNTER — TELEPHONE (OUTPATIENT)
Dept: CARDIOLOGY | Facility: CLINIC | Age: 65
End: 2020-10-29

## 2020-10-29 DIAGNOSIS — I10 ESSENTIAL HYPERTENSION: Primary | ICD-10-CM

## 2020-10-29 NOTE — TELEPHONE ENCOUNTER
CPA PT    Medication Change  PT B/P log    10/21/20 123/72 heart rate 58  10/24/20 130/74   53  10/25/20 127/72   55  10/26/20 119/69   63  10/27/20 118/70   54  10/28/20 113/67   57  10/29/20 120/76   54    Bystolic 10 mg if we are keeping it needs new prescription sent to [facility] pharmacy or if DR Arauz wants to change mg.

## 2020-12-21 RX ORDER — NEBIVOLOL 10 MG/1
10 TABLET ORAL DAILY
Qty: 90 TABLET | Refills: 1 | Status: SHIPPED | OUTPATIENT
Start: 2020-12-21 | End: 2022-10-20 | Stop reason: ALTCHOICE

## 2021-03-11 ENCOUNTER — APPOINTMENT (OUTPATIENT)
Dept: WOMENS IMAGING | Facility: HOSPITAL | Age: 66
End: 2021-03-11

## 2021-03-11 PROCEDURE — 77067 SCR MAMMO BI INCL CAD: CPT | Performed by: RADIOLOGY

## 2021-03-11 PROCEDURE — 77063 BREAST TOMOSYNTHESIS BI: CPT | Performed by: RADIOLOGY

## 2021-03-16 ENCOUNTER — BULK ORDERING (OUTPATIENT)
Dept: CASE MANAGEMENT | Facility: OTHER | Age: 66
End: 2021-03-16

## 2021-03-16 DIAGNOSIS — Z23 IMMUNIZATION DUE: ICD-10-CM

## 2021-04-06 ENCOUNTER — IMMUNIZATION (OUTPATIENT)
Dept: VACCINE CLINIC | Facility: HOSPITAL | Age: 66
End: 2021-04-06

## 2021-04-06 ENCOUNTER — OFFICE VISIT (OUTPATIENT)
Dept: CARDIOLOGY | Facility: CLINIC | Age: 66
End: 2021-04-06

## 2021-04-06 VITALS
DIASTOLIC BLOOD PRESSURE: 76 MMHG | SYSTOLIC BLOOD PRESSURE: 116 MMHG | HEART RATE: 56 BPM | HEIGHT: 63 IN | WEIGHT: 241 LBS | BODY MASS INDEX: 42.7 KG/M2

## 2021-04-06 DIAGNOSIS — I10 ESSENTIAL HYPERTENSION: Chronic | ICD-10-CM

## 2021-04-06 DIAGNOSIS — R00.1 BRADYCARDIA, SINUS: Primary | Chronic | ICD-10-CM

## 2021-04-06 DIAGNOSIS — E78.5 DYSLIPIDEMIA: Chronic | ICD-10-CM

## 2021-04-06 PROCEDURE — 93000 ELECTROCARDIOGRAM COMPLETE: CPT | Performed by: INTERNAL MEDICINE

## 2021-04-06 PROCEDURE — 0001A: CPT | Performed by: INTERNAL MEDICINE

## 2021-04-06 PROCEDURE — 99213 OFFICE O/P EST LOW 20 MIN: CPT | Performed by: INTERNAL MEDICINE

## 2021-04-06 PROCEDURE — 91300 HC SARSCOV02 VAC 30MCG/0.3ML IM: CPT | Performed by: INTERNAL MEDICINE

## 2021-04-06 NOTE — PROGRESS NOTES
"Chief Complaint  Hypertension    Subjective    History of Present Illness      I had the pleasure of seeing Ms. Soliz in the office today.  She is a pleasant 66-year-old female with a history of hypertension, dyslipidemia, prediabetes, obstructive sleep apnea, anxiety, obesity and a history of premature coronary disease.  She had an echocardiogram in June 2020 which demonstrated normal left ventricular function.  She had grade 2 diastolic dysfunction.  She had mild mitral insufficiency and trace to mild tricuspid insufficiency.  She did have a trace to small pericardial effusion.    Ms. Soliz states that she is feeling fairly well.  She has no complaints of chest discomfort or shortness of air.  No palpitations, dizziness or near syncope.  She has not had lab work in quite some time however is scheduled to see her primary care provider and have lab work in June 2021.  She does state that she increased her Bystolic to 20 mg due to elevated blood pressure readings at home.  She is tolerating the medication well.    Objective   Vital Signs:   /76   Pulse 56   Ht 160 cm (63\")   Wt 109 kg (241 lb)   BMI 42.69 kg/m²     Vitals and nursing note reviewed.   Constitutional:       General: Not in acute distress.     Appearance: Healthy appearance. Well-developed and not in distress. Not diaphoretic.   Eyes:      General: No scleral icterus.     Conjunctiva/sclera: Conjunctivae normal.      Pupils: Pupils are equal, round, and reactive to light.   HENT:      Head: Normocephalic and atraumatic.   Neck:      Thyroid: No thyromegaly.      Lymphadenopathy: No cervical adenopathy.   Pulmonary:      Effort: Pulmonary effort is normal. No respiratory distress.      Breath sounds: Normal breath sounds. No wheezing. No rales.   Cardiovascular:      PMI at left midclavicular line. Bradycardia present. Regular rhythm. Normal S1. Normal S2.      Murmurs: There is no murmur.      No gallop. No S3 and S4 gallop. No click. No " rub.   Pulses:     Intact distal pulses. No decreased pulses.   Edema:     Peripheral edema absent.   Abdominal:      General: Bowel sounds are normal. There is no distension.      Palpations: Abdomen is soft. There is no abdominal mass.      Tenderness: There is no abdominal tenderness. There is no guarding or rebound.   Musculoskeletal: Normal range of motion.      Cervical back: Normal range of motion and neck supple. Skin:     General: Skin is warm and dry.      Coloration: Skin is not pale.      Findings: No rash.   Neurological:      Mental Status: Alert, oriented to person, place, and time and oriented to person, place and time.      Coordination: Coordination normal.      Gait: Gait is intact.   Psychiatric:         Behavior: Behavior normal.         Result Review :   The following data was reviewed by: Regla Eagle MD on 04/06/2021:  CMP    CMP 5/1/20 5/1/20 5/2/20 5/3/20    0555 2055     Glucose 86  84 89   BUN 7 (A)  7 (A) 9   Creatinine 0.72  0.85 0.92   eGFR Non African Am 81  67 61   Sodium 134 (A)  136 136   Potassium 3.4 (A) 4.2 3.9 3.1 (A)   Chloride 99  102 98   Calcium 8.2 (A)  8.5 (A) 8.8   (A) Abnormal value            CBC    CBC 5/1/20 5/2/20 5/3/20   WBC 5.45 5.71 5.70   RBC 3.98 3.88 3.94   Hemoglobin 12.9 12.4 12.3   Hematocrit 36.1 35.3 35.8   MCV 90.7 91.0 90.9   MCH 32.4 32.0 31.2   MCHC 35.7 35.1 34.4   RDW 13.2 13.2 12.6   Platelets 192 194 191               TSH    TSH 4/29/20 5/1/20   TSH 0.746 1.360      Comments are available for some flowsheets but are not being displayed.                ECG 12 Lead    Date/Time: 4/6/2021 12:30 PM  Performed by: Regla Eagle MD  Authorized by: Regla Eagle MD   Comparison: compared with previous ECG from 6/17/2020  Similar to previous ECG  Comments: Sinus bradycardia, left atrial abnormality.  Early repolarization              Assessment and Plan    1. Essential hypertension  Stable, she is currently on amlodipine, Edarbi and  Bystolic.    2. Dyslipidemia  She is on rosuvastatin 40 mg daily.  She is scheduled to have lab work in June 2021.    3.  Sinus bradycardia  Her EKG demonstrates sinus bradycardia with a rate of 53 bpm.  She is not complaining of fatigue or dizziness.  I have asked her to monitor her heart rate at home since she is on 20 mg of Bystolic.        Follow Up   No follow-ups on file.  Patient was given instructions and counseling regarding her condition or for health maintenance advice. Please see specific information pulled into the AVS if appropriate.

## 2021-04-27 ENCOUNTER — IMMUNIZATION (OUTPATIENT)
Dept: VACCINE CLINIC | Facility: HOSPITAL | Age: 66
End: 2021-04-27

## 2021-04-27 PROCEDURE — 0002A: CPT | Performed by: INTERNAL MEDICINE

## 2021-04-27 PROCEDURE — 91300 HC SARSCOV02 VAC 30MCG/0.3ML IM: CPT | Performed by: INTERNAL MEDICINE

## 2021-06-09 DIAGNOSIS — I10 ESSENTIAL HYPERTENSION: ICD-10-CM

## 2021-06-09 RX ORDER — AMLODIPINE BESYLATE 5 MG/1
5 TABLET ORAL DAILY
Qty: 90 TABLET | Refills: 1 | Status: SHIPPED | OUTPATIENT
Start: 2021-06-09 | End: 2021-12-06

## 2021-12-01 ENCOUNTER — OFFICE VISIT (OUTPATIENT)
Dept: SLEEP MEDICINE | Facility: HOSPITAL | Age: 66
End: 2021-12-01

## 2021-12-01 VITALS
SYSTOLIC BLOOD PRESSURE: 170 MMHG | HEIGHT: 63 IN | WEIGHT: 231 LBS | HEART RATE: 61 BPM | DIASTOLIC BLOOD PRESSURE: 73 MMHG | BODY MASS INDEX: 40.93 KG/M2 | OXYGEN SATURATION: 97 %

## 2021-12-01 DIAGNOSIS — G47.8 NON-RESTORATIVE SLEEP: ICD-10-CM

## 2021-12-01 DIAGNOSIS — G47.10 HYPERSOMNIA: ICD-10-CM

## 2021-12-01 DIAGNOSIS — E66.01 MORBID OBESITY (HCC): ICD-10-CM

## 2021-12-01 DIAGNOSIS — G47.63 SLEEP-RELATED BRUXISM: ICD-10-CM

## 2021-12-01 DIAGNOSIS — G47.33 OBSTRUCTIVE SLEEP APNEA: Primary | ICD-10-CM

## 2021-12-01 PROCEDURE — G0463 HOSPITAL OUTPT CLINIC VISIT: HCPCS

## 2021-12-01 PROCEDURE — 99204 OFFICE O/P NEW MOD 45 MIN: CPT | Performed by: FAMILY MEDICINE

## 2021-12-01 NOTE — PROGRESS NOTES
[facility] New Patient/Consultation       Reason for Consultation: DELIA      Patient Care Team:  Ame Dempsey MD as PCP - General (Internal Medicine)  Kristie Johnson MD as Consulting Physician (Obstetrics and Gynecology)  Regla Eagle MD as Consulting Physician (Cardiology)  Annabel Loaiza MD as Consulting Physician (Sleep Medicine)      History of present illness:  Thank you for asking me to see your patient.  The patient is a 66 y.o. female 9/17/2018.  Home sleep study May 2016 showed AHI 11.5; supine AHI 42.  Started on auto CPAP 6-20 cm H2O.  At last visit compliance download was not available.  Advised to continue with same settings.  Patient presents to reestablish care.  Uses DME company [employer].  Review of compliance data today.  Date range 10/6/2018-11/4/2018.  No recent data.  Mean pressure 6.5 cm H2O.  Average usage 6 hours 43 minutes.  Average AHI 0.6.    Sleep Schedule:  Bed time: 12:30 AM to 1:30 AM  Sleep latency: 20 minutes to 1 hour  Wake time: 9:30 AM weekdays 7:45 AM to 9:30 AM weekends  Average hours slept: 6-8  Non-restorative sleep: Rested it close to 8 hours  Number of naps per day: Maybe 1 3 days a week  Rotating shifts?:  No  Nocturia: 1X  Electronics in bedroom: N    Excessive daytime sleepiness or drowsiness:Y  Any accidents at work due to sleepiness in the last 5 years:N  Any difficulty driving due to sleepiness or being drowsy: Y  Weight changed in the last 5 years:Y    Snoring:N  Witnessed apneas:Y  Have you ever awakened gasping for breath, coughing, choking or respiratory discomfort: N  Morning headaches: N  Awaken with a sore throat or dry mouth: N    Any reports of leg jerking at night: N  Urge sensations: Y ONCE IN AWHILE  Does pain disrupt sleep: N  Sweating during sleep: Y  Teeth grinding: N    Any sudden episodes of sleep during the day: N  Sleep paralysis/hallucinations: N  Muscle weakness with laughing/anger: N  Nightmares: N  Sleep walking: N    Are you  "sleepy when you increase your sleep time: N  Do you sleep better away from your own bed: N    ESS: 10    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    Family History: DELIA no       Current Outpatient Medications:   •  amLODIPine (NORVASC) 5 MG tablet, TAKE 1 TABLET BY MOUTH DAILY., Disp: 90 tablet, Rfl: 1  •  cholecalciferol (VITAMIN D3) 25 MCG (1000 UT) tablet, Take 1,000 Units by mouth Daily., Disp: , Rfl:   •  clobetasol (TEMOVATE) 0.05 % cream, Apply  topically to the appropriate area as directed 2 (Two) Times a Day., Disp: , Rfl:   •  EDARBI 80 MG tablet tablet, Take 80 mg by mouth Daily., Disp: , Rfl:   •  estradiol (ESTRACE) 0.1 MG/GM vaginal cream, Insert 2 g into the vagina Daily., Disp: , Rfl:   •  levothyroxine (Euthyrox) 88 MCG tablet, Take 88 mcg by mouth Daily., Disp: , Rfl:   •  montelukast (SINGULAIR) 10 MG tablet, Take 10 mg by mouth Every Morning., Disp: , Rfl:   •  nebivolol (BYSTOLIC) 10 MG tablet, Take 1 tablet by mouth Daily. (Patient taking differently: Take 20 mg by mouth Daily.), Disp: 90 tablet, Rfl: 1  •  rosuvastatin (CRESTOR) 40 MG tablet, Take 40 mg by mouth Daily., Disp: , Rfl:   •  sertraline (ZOLOFT) 50 MG tablet, Take 50 mg by mouth Daily., Disp: , Rfl:     Vital Signs:    Vitals:    12/01/21 1301   BP: 170/73   Pulse: 61   SpO2: 97%   Weight: 105 kg (231 lb)   Height: 160 cm (62.99\")      Body mass index is 40.93 kg/m².  Neck Circumference: 16 inches      Physical Exam:   General Alert and oriented. No acute distress noted   Pharynx/Throat Class IV Mallampati airway, large tongue, no evidence of redundant lateral pharyngeal tissue. No oral lesions. No thrush. Moist mucous membranes. "   Head Normocephalic. Symmetrical. Atraumatic.    Nose No septal deviation. No drainage   Chest Wall Normal shape. Symmetric expansion with respiration. No tenderness.   Neck Trachea midline, no thyromegaly or adenopathy    Lungs Clear to auscultation bilaterally. No wheezes. No rhonchi. No rales. Respirations regular, even and unlabored.   Heart Regular rhythm and normal rate. Normal S1 and S2. No murmur   Abdomen Soft, non-tender and non-distended. Normal bowel sounds. No masses.   Extremities Moves all extremities well. No edema   Psychiatric Normal mood and affect.       Impression:  1. Obstructive sleep apnea    2. Morbid obesity (HCC)    3. Sleep-related bruxism    4. Hypersomnia    5. Non-restorative sleep        Plan:    Good sleep hygiene measures should be maintained.  Weight loss would be beneficial in this patient who is morbidly obese with BMI 40.9.    We will place order for supply renewal with patient is a pleasant have been reviewed and several years.  She will need a new ST card as well.  Return to clinic in 8 weeks for follow-up or sooner if needed.    Thank you for allowing me to participate in your patient's care.    Annabel Loaiza MD  Sleep Medicine  12/01/21  13:10 EST

## 2021-12-06 DIAGNOSIS — I10 ESSENTIAL HYPERTENSION: ICD-10-CM

## 2021-12-06 RX ORDER — AMLODIPINE BESYLATE 5 MG/1
5 TABLET ORAL DAILY
Qty: 90 TABLET | Refills: 1 | Status: SHIPPED | OUTPATIENT
Start: 2021-12-06 | End: 2022-06-08

## 2021-12-21 ENCOUNTER — IMMUNIZATION (OUTPATIENT)
Dept: VACCINE CLINIC | Facility: HOSPITAL | Age: 66
End: 2021-12-21

## 2021-12-21 PROCEDURE — 91300 HC SARSCOV02 VAC 30MCG/0.3ML IM: CPT | Performed by: INTERNAL MEDICINE

## 2021-12-21 PROCEDURE — 0004A HC ADM SARSCOV2 30MCG/0.3ML BOOSTER: CPT | Performed by: INTERNAL MEDICINE

## 2022-01-07 ENCOUNTER — TRANSCRIBE ORDERS (OUTPATIENT)
Dept: SLEEP MEDICINE | Facility: HOSPITAL | Age: 67
End: 2022-01-07

## 2022-01-07 DIAGNOSIS — Z13.6 ENCOUNTER FOR SCREENING FOR VASCULAR DISEASE: Primary | ICD-10-CM

## 2022-01-14 ENCOUNTER — TRANSCRIBE ORDERS (OUTPATIENT)
Dept: ADMINISTRATIVE | Facility: HOSPITAL | Age: 67
End: 2022-01-14

## 2022-01-14 DIAGNOSIS — Z13.6 ENCOUNTER FOR SCREENING FOR CARDIOVASCULAR DISORDERS: Primary | ICD-10-CM

## 2022-01-18 ENCOUNTER — PRE-PROCEDURE SCREENING (OUTPATIENT)
Dept: GASTROENTEROLOGY | Facility: CLINIC | Age: 67
End: 2022-01-18

## 2022-01-18 NOTE — TELEPHONE ENCOUNTER
Last scope 3/6/18 in epic-- Personal history of polyps-- Family history of polyps ( mother)--No family history of polyps--No blood thinners--Medications:           amLODIPine (NORVASC) 5 MG tablet  cholecalciferol (VITAMIN D3) 25 MCG (1000 UT) tablet  clobetasol (TEMOVATE) 0.05 % cream  EDARBI 80 MG tablet tablet  estradiol (ESTRACE) 0.1 MG/GM vaginal cream  levothyroxine (Euthyrox) 88 MCG tablet  montelukast (SINGULAIR) 10 MG tablet  nebivolol (BYSTOLIC) 10 MG tablet  rosuvastatin (CRESTOR) 40 MG tablet  sertraline (ZOLOFT) 50 MG tablet      Pt verbalized and understood that it would be few week before been schedule

## 2022-01-19 DIAGNOSIS — D12.6 ADENOMATOUS POLYP OF COLON, UNSPECIFIED PART OF COLON: Primary | ICD-10-CM

## 2022-01-19 DIAGNOSIS — Z83.71 FAMILY HISTORY OF POLYPS IN THE COLON: ICD-10-CM

## 2022-01-27 ENCOUNTER — TELEPHONE (OUTPATIENT)
Dept: GASTROENTEROLOGY | Facility: CLINIC | Age: 67
End: 2022-01-27

## 2022-01-27 PROBLEM — Z83.71 FAMILY HISTORY OF POLYPS IN THE COLON: Status: ACTIVE | Noted: 2022-01-27

## 2022-01-27 PROBLEM — Z83.719 FAMILY HISTORY OF POLYPS IN THE COLON: Status: ACTIVE | Noted: 2022-01-27

## 2022-01-27 PROBLEM — D12.6 ADENOMATOUS POLYP OF COLON: Status: ACTIVE | Noted: 2022-01-27

## 2022-01-27 NOTE — TELEPHONE ENCOUNTER
KATHLEEN Rogers for colonoscopy on 05/05/2022  arrive at 8am   . Mailed Prep instructions to Mailing address on-file. ----[facility]      Advised PT  that  will call with final arrival time  24 hrs before procedure. If they do not get a phone call, arrival time will stay the same as given on instructions

## 2022-02-01 ENCOUNTER — OFFICE VISIT (OUTPATIENT)
Dept: SLEEP MEDICINE | Facility: HOSPITAL | Age: 67
End: 2022-02-01

## 2022-02-01 VITALS
SYSTOLIC BLOOD PRESSURE: 135 MMHG | OXYGEN SATURATION: 97 % | DIASTOLIC BLOOD PRESSURE: 79 MMHG | BODY MASS INDEX: 41.46 KG/M2 | HEIGHT: 63 IN | HEART RATE: 63 BPM | WEIGHT: 234 LBS

## 2022-02-01 DIAGNOSIS — G47.33 OBSTRUCTIVE SLEEP APNEA: Primary | ICD-10-CM

## 2022-02-01 DIAGNOSIS — E66.01 MORBID OBESITY: ICD-10-CM

## 2022-02-01 PROCEDURE — G0463 HOSPITAL OUTPT CLINIC VISIT: HCPCS

## 2022-02-01 PROCEDURE — 99213 OFFICE O/P EST LOW 20 MIN: CPT | Performed by: FAMILY MEDICINE

## 2022-02-01 NOTE — PROGRESS NOTES
Follow Up [facility] Note     Chief Complaint:  DELIA     Primary Care Physician: Ame Dempsey MD    Kelle Soliz is a 67 y.o.female  was last seen at [facility] sleep lab: 12/1/2021.  Home sleep study May 2016 showed AHI 11.5 supine AHI 42.  Started auto CPAP 6-20 cm H2O.  No follow-up since then.  At last visit there was no recent data.  Supply renewal order was placed at last visit.  She also needs an ST card.  Presents today for 2-month follow-up.  Today patient reports she goes to bed around 1 AM wakes up around 9:30 AM.  Sleeps around 7 to 8 hours.  ESS of 10.  Has a nasal pillows mask.  No dry mouth.  Since last visit DME only gave her new filters did not replace tubing or mask.  We will place order again today.    Results Review:  DME is naps.  Downloads between 12/5/2021-1/3/2022.  Average usage is 7 hours 25 minutes.  Average AHI is 1.1.  Average AutoCPAP pressure is 7.0 cm H2O.    Current Medications:    Current Outpatient Medications:   •  amLODIPine (NORVASC) 5 MG tablet, TAKE 1 TABLET BY MOUTH DAILY., Disp: 90 tablet, Rfl: 1  •  cholecalciferol (VITAMIN D3) 25 MCG (1000 UT) tablet, Take 1,000 Units by mouth Daily., Disp: , Rfl:   •  clobetasol (TEMOVATE) 0.05 % cream, Apply  topically to the appropriate area as directed 2 (Two) Times a Day., Disp: , Rfl:   •  EDARBI 80 MG tablet tablet, Take 80 mg by mouth Daily., Disp: , Rfl:   •  estradiol (ESTRACE) 0.1 MG/GM vaginal cream, Insert 2 g into the vagina Daily., Disp: , Rfl:   •  levothyroxine (Euthyrox) 88 MCG tablet, Take 88 mcg by mouth Daily., Disp: , Rfl:   •  montelukast (SINGULAIR) 10 MG tablet, Take 10 mg by mouth Every Morning., Disp: , Rfl:   •  nebivolol (BYSTOLIC) 10 MG tablet, Take 1 tablet by mouth Daily. (Patient taking differently: Take 20 mg by mouth Daily.), Disp: 90 tablet, Rfl: 1  •  rosuvastatin (CRESTOR) 40 MG tablet, Take 40 mg by mouth Daily., Disp: , Rfl:   •  sertraline (ZOLOFT) 50 MG tablet, Take 50 mg by mouth  "Daily., Disp: , Rfl:    also entered in Sleep Questionnaire    Patient  has a past medical history of Anemia (in mid-40s), Arthritis, Cholelithiasis (around 2000), Colon polyp, Diabetes mellitus (CMS/HCC), Diastolic dysfunction, Disease of thyroid gland, Diverticulosis, Diverticulosis, Fatty liver, GERD (gastroesophageal reflux disease), H. pylori infection, Heart murmur, Hemorrhoid, Hernia, colonic polyp, Hyperlipidemia, Hypertension, Hypokalemia, Hyponatremia, Pericardial effusion, PSVT (paroxysmal supraventricular tachycardia) (CMS/HCC), and Sleep apnea.    Social History:    Social History     Socioeconomic History   • Marital status:    Tobacco Use   • Smoking status: Never Smoker   • Smokeless tobacco: Never Used   Substance and Sexual Activity   • Alcohol use: No   • Drug use: No   • Sexual activity: Defer     Partners: Male       Allergies:  Patient has no known allergies.    Vital Signs:    Vitals:    02/01/22 0900   BP: 135/79   Pulse: 63   SpO2: 97%   Weight: 106 kg (234 lb)   Height: 160 cm (62.99\")     Body mass index is 41.46 kg/m².    REVIEW OF SYSTEMS.  Full review of systems available on the intake form which is scanned in the media tab.  The relevant positive are noted below  1. Daytime excessive sleepiness with Pioneer Sleepiness Scale :Total score: 10   2. Snoring  3. All negative      Physical exam:  Vitals:    02/01/22 0900   BP: 135/79   Pulse: 63   SpO2: 97%   Weight: 106 kg (234 lb)   Height: 160 cm (62.99\")    Body mass index is 41.46 kg/m².    HEENT: Head is atraumatic, normocephalic  Eyes: pupils are round equal and reacting to light and accommodation, conjunctiva normal  Nose: no nasal septal defects or deviation and the nasal passages are clear, no nasal polyps,  Throat:  tongue normal, oral airway Mallampati class iv  NECK: , trachea is in the midline, thyroid not enlarged  RESPIRATORY SYSTEM: Breath sounds are equal on both sides, there are no wheezes   CARDIOVASULAR SYSTEM: " Heart sounds are regular rhythm and joe rate, no edema  EXTREMITES: No cyanosis, clubbing  NEUROLOGICAL SYSTEM: Oriented x 3, no gross motor defects, gait normal    Impression:  1. Obstructive sleep apnea    2. Morbid obesity (HCC)        Obstructive sleep apnea adequately treated with auto CPAP 6-20 cm H2O with good compliance and usage and no complaints of hypersomnolence.  Return to clinic in 6 months for follow-up or sooner if needed.    Patient uses the CPAP device and benefits from its use in terms of reduction of hypersomnia and snoring.Weight loss will be strongly beneficial to reduce the severity of sleep-disordered breathing.  Caution during activities that require prolonged concentration is strongly advised if sleepiness returns. Changing of PAP supplies regularly is important for effective use. Patient needs to change cushion on the mask or plugs on nasal pillows along with disposable filters once every month and change mask frame, tubing, headgear and Velcro straps every 6 months at the minimum.    Time spent during visit: 20 minutes of which at least 50% of the time was spent counseling patient.    Annabel Loaiza MD  Sleep Medicine  02/01/22  10:31 EST

## 2022-02-07 ENCOUNTER — APPOINTMENT (OUTPATIENT)
Dept: CT IMAGING | Facility: HOSPITAL | Age: 67
End: 2022-02-07

## 2022-03-31 ENCOUNTER — APPOINTMENT (OUTPATIENT)
Dept: WOMENS IMAGING | Facility: HOSPITAL | Age: 67
End: 2022-03-31

## 2022-03-31 PROCEDURE — 77067 SCR MAMMO BI INCL CAD: CPT | Performed by: RADIOLOGY

## 2022-03-31 PROCEDURE — 77063 BREAST TOMOSYNTHESIS BI: CPT | Performed by: RADIOLOGY

## 2022-04-04 PROBLEM — G47.33 OBSTRUCTIVE SLEEP APNEA: Chronic | Status: ACTIVE | Noted: 2020-04-29

## 2022-04-04 NOTE — PROGRESS NOTES
"Chief Complaint  Hypertension    Subjective    History of Present Illness    I had the pleasure of seeing Ms. Soliz in the office today.  She is a pleasant 67-year-old female with a history of hypertension, dyslipidemia, prediabetes, obstructive sleep apnea, anxiety, obesity and a history of premature family history of coronary disease.  She had an echocardiogram in June 2020 which demonstrated normal left ventricular function.  She had grade 2 diastolic dysfunction.  She had mild mitral insufficiency and trace to mild tricuspid insufficiency.  She did have a trace to small pericardial effusion.    Ms. Welch states that she is feeling well.  She is not complaining of chest discomfort or shortness of air.  She has occasional palpitations but nothing that is sustained or causing symptoms.  Her blood pressure has been well controlled  She does have some ankle edema at the end of the day but this is generally gone by morning.  No orthopnea or paroxysmal nocturnal dyspnea.  She has not started her formal exercise program and I have encouraged her to do so.  She is having some myalgias and arthralgias which she feels is related to her statin therapy.  She did hold her statin for a week and a half and did notice improvement in her symptoms.      Objective   Vital Signs:   /80   Pulse 57   Ht 157.5 cm (62\")   Wt 108 kg (239 lb)   BMI 43.71 kg/m²     Vitals and nursing note reviewed.   Constitutional:       General: Not in acute distress.     Appearance: Healthy appearance. Well-developed and not in distress. Not diaphoretic.   Eyes:      General: No scleral icterus.     Conjunctiva/sclera: Conjunctivae normal.      Pupils: Pupils are equal, round, and reactive to light.   HENT:      Head: Normocephalic and atraumatic.   Neck:      Thyroid: No thyromegaly.      Lymphadenopathy: No cervical adenopathy.   Pulmonary:      Effort: Pulmonary effort is normal. No respiratory distress.      Breath sounds: Normal breath " sounds. No wheezing. No rales.   Cardiovascular:      PMI at left midclavicular line. Bradycardia present. Regular rhythm. Normal S1. Normal S2.      Murmurs: There is no murmur.      No gallop. No S3 and S4 gallop. No click. No rub.   Pulses:     Intact distal pulses. No decreased pulses.   Edema:     Peripheral edema absent.   Abdominal:      General: Bowel sounds are normal. There is no distension.      Palpations: Abdomen is soft. There is no abdominal mass.      Tenderness: There is no abdominal tenderness. There is no guarding or rebound.   Musculoskeletal: Normal range of motion.      Cervical back: Normal range of motion and neck supple. Skin:     General: Skin is warm and dry.      Coloration: Skin is not pale.      Findings: No rash.   Neurological:      Mental Status: Alert, oriented to person, place, and time and oriented to person, place and time.      Coordination: Coordination normal.      Gait: Gait is intact.   Psychiatric:         Behavior: Behavior normal.           Result Review :                 Assessment and Plan    1. Essential hypertension  Her blood pressure is well controlled.  She is tolerating her antihypertensive medications well.    2. Dyslipidemia  I do not have a copy of her most recent lipid profile.  Will obtain from Dr. Dempsey's office.  She is having difficulty tolerating her rosuvastatin.  She is on a high dose.  She is going to hold her statin until she has lab work in June 2022.  We will see what her lipid profile is like off of statin therapy and then perhaps we could use a different statin or lower dose.    3. Palpitations  Occasional palpitations.  She states the episodes are infrequent and do not cause dizziness or near syncope.        Follow Up   No follow-ups on file.  Patient was given instructions and counseling regarding her condition or for health maintenance advice. Please see specific information pulled into the AVS if appropriate.

## 2022-04-06 ENCOUNTER — OFFICE VISIT (OUTPATIENT)
Dept: CARDIOLOGY | Facility: CLINIC | Age: 67
End: 2022-04-06

## 2022-04-06 VITALS
DIASTOLIC BLOOD PRESSURE: 80 MMHG | HEIGHT: 62 IN | SYSTOLIC BLOOD PRESSURE: 114 MMHG | HEART RATE: 57 BPM | WEIGHT: 239 LBS | BODY MASS INDEX: 43.98 KG/M2

## 2022-04-06 DIAGNOSIS — E78.5 DYSLIPIDEMIA: Chronic | ICD-10-CM

## 2022-04-06 DIAGNOSIS — I10 ESSENTIAL HYPERTENSION: Primary | Chronic | ICD-10-CM

## 2022-04-06 DIAGNOSIS — R00.2 PALPITATIONS: Chronic | ICD-10-CM

## 2022-04-06 PROCEDURE — 99214 OFFICE O/P EST MOD 30 MIN: CPT | Performed by: INTERNAL MEDICINE

## 2022-04-06 RX ORDER — ESCITALOPRAM OXALATE 10 MG/1
10 TABLET ORAL DAILY
COMMUNITY
Start: 2022-03-15

## 2022-04-15 ENCOUNTER — HOSPITAL ENCOUNTER (OUTPATIENT)
Dept: CARDIOLOGY | Facility: HOSPITAL | Age: 67
Discharge: HOME | End: 2022-04-15
Admitting: INTERNAL MEDICINE

## 2022-04-15 VITALS
HEART RATE: 58 BPM | HEIGHT: 62 IN | SYSTOLIC BLOOD PRESSURE: 160 MMHG | BODY MASS INDEX: 43.79 KG/M2 | WEIGHT: 238 LBS | DIASTOLIC BLOOD PRESSURE: 88 MMHG

## 2022-04-15 DIAGNOSIS — Z13.6 ENCOUNTER FOR SCREENING FOR CARDIOVASCULAR DISORDERS: ICD-10-CM

## 2022-04-15 LAB
BH CV ECHO MEAS - DIST AO DIAM: 1.32 CM
BH CV VAS BP LEFT ARM: NORMAL MMHG
BH CV VAS BP RIGHT ARM: NORMAL MMHG
BH CV XLRA MEAS - MID AO DIAM: 1.67 CM
BH CV XLRA MEAS - PAD LEFT ABI DP: 1.11
BH CV XLRA MEAS - PAD LEFT ABI PT: 1.17
BH CV XLRA MEAS - PAD LEFT ARM: 159 MMHG
BH CV XLRA MEAS - PAD LEFT LEG DP: 177 MMHG
BH CV XLRA MEAS - PAD LEFT LEG PT: 187 MMHG
BH CV XLRA MEAS - PAD RIGHT ABI DP: 1.18
BH CV XLRA MEAS - PAD RIGHT ABI PT: 1.24
BH CV XLRA MEAS - PAD RIGHT ARM: 160 MMHG
BH CV XLRA MEAS - PAD RIGHT LEG DP: 188 MMHG
BH CV XLRA MEAS - PAD RIGHT LEG PT: 198 MMHG
BH CV XLRA MEAS - PROX AO DIAM: 1.89 CM
BH CV XLRA MEAS LEFT ICA/CCA RATIO: 1.01
BH CV XLRA MEAS LEFT MID CCA PSV: NORMAL CM/SEC
BH CV XLRA MEAS LEFT MID ICA PSV: NORMAL CM/SEC
BH CV XLRA MEAS LEFT PROX ECA PSV: NORMAL CM/SEC
BH CV XLRA MEAS RIGHT ICA/CCA RATIO: 1.26
BH CV XLRA MEAS RIGHT MID CCA PSV: NORMAL CM/SEC
BH CV XLRA MEAS RIGHT MID ICA PSV: NORMAL CM/SEC
BH CV XLRA MEAS RIGHT PROX ECA PSV: NORMAL CM/SEC
MAXIMAL PREDICTED HEART RATE: 151 BPM
STRESS TARGET HR: 128 BPM

## 2022-04-15 PROCEDURE — 93799 UNLISTED CV SVC/PROCEDURE: CPT

## 2022-04-27 ENCOUNTER — OFFICE VISIT (OUTPATIENT)
Dept: GASTROENTEROLOGY | Facility: CLINIC | Age: 67
End: 2022-04-27

## 2022-04-27 VITALS
WEIGHT: 236 LBS | HEIGHT: 62 IN | BODY MASS INDEX: 43.43 KG/M2 | SYSTOLIC BLOOD PRESSURE: 125 MMHG | TEMPERATURE: 97.1 F | DIASTOLIC BLOOD PRESSURE: 82 MMHG

## 2022-04-27 DIAGNOSIS — Z83.71 FAMILY HISTORY OF POLYPS IN THE COLON: ICD-10-CM

## 2022-04-27 DIAGNOSIS — K76.0 HEPATIC STEATOSIS: ICD-10-CM

## 2022-04-27 DIAGNOSIS — R10.10 PAIN OF UPPER ABDOMEN: Primary | ICD-10-CM

## 2022-04-27 DIAGNOSIS — Z86.19 HISTORY OF HELICOBACTER PYLORI INFECTION: ICD-10-CM

## 2022-04-27 DIAGNOSIS — D12.6 ADENOMATOUS POLYP OF COLON, UNSPECIFIED PART OF COLON: ICD-10-CM

## 2022-04-27 PROCEDURE — 99214 OFFICE O/P EST MOD 30 MIN: CPT | Performed by: NURSE PRACTITIONER

## 2022-05-03 ENCOUNTER — LAB (OUTPATIENT)
Dept: LAB | Facility: HOSPITAL | Age: 67
End: 2022-05-03

## 2022-05-03 DIAGNOSIS — R10.10 PAIN OF UPPER ABDOMEN: ICD-10-CM

## 2022-05-03 DIAGNOSIS — Z86.19 HISTORY OF HELICOBACTER PYLORI INFECTION: ICD-10-CM

## 2022-05-03 DIAGNOSIS — Z83.71 FAMILY HISTORY OF POLYPS IN THE COLON: ICD-10-CM

## 2022-05-03 DIAGNOSIS — D12.6 ADENOMATOUS POLYP OF COLON, UNSPECIFIED PART OF COLON: ICD-10-CM

## 2022-05-03 LAB — SARS-COV-2 ORF1AB RESP QL NAA+PROBE: NOT DETECTED

## 2022-05-03 PROCEDURE — U0004 COV-19 TEST NON-CDC HGH THRU: HCPCS

## 2022-05-04 RX ORDER — ESTRADIOL 10 UG/1
1 INSERT VAGINAL 2 TIMES WEEKLY
COMMUNITY

## 2022-05-05 ENCOUNTER — HOSPITAL ENCOUNTER (OUTPATIENT)
Facility: HOSPITAL | Age: 67
Setting detail: HOSPITAL OUTPATIENT SURGERY
Discharge: HOME | End: 2022-05-05
Attending: INTERNAL MEDICINE | Admitting: INTERNAL MEDICINE

## 2022-05-05 ENCOUNTER — ANESTHESIA (OUTPATIENT)
Dept: GASTROENTEROLOGY | Facility: HOSPITAL | Age: 67
End: 2022-05-05

## 2022-05-05 ENCOUNTER — ANESTHESIA EVENT (OUTPATIENT)
Dept: GASTROENTEROLOGY | Facility: HOSPITAL | Age: 67
End: 2022-05-05

## 2022-05-05 VITALS
OXYGEN SATURATION: 95 % | HEIGHT: 62 IN | BODY MASS INDEX: 42.88 KG/M2 | SYSTOLIC BLOOD PRESSURE: 143 MMHG | RESPIRATION RATE: 20 BRPM | WEIGHT: 233 LBS | HEART RATE: 56 BPM | DIASTOLIC BLOOD PRESSURE: 81 MMHG

## 2022-05-05 DIAGNOSIS — Z83.71 FAMILY HISTORY OF POLYPS IN THE COLON: ICD-10-CM

## 2022-05-05 DIAGNOSIS — D12.6 ADENOMATOUS POLYP OF COLON, UNSPECIFIED PART OF COLON: ICD-10-CM

## 2022-05-05 PROCEDURE — 43239 EGD BIOPSY SINGLE/MULTIPLE: CPT | Performed by: INTERNAL MEDICINE

## 2022-05-05 PROCEDURE — 45378 DIAGNOSTIC COLONOSCOPY: CPT | Performed by: INTERNAL MEDICINE

## 2022-05-05 PROCEDURE — S0260 H&P FOR SURGERY: HCPCS | Performed by: INTERNAL MEDICINE

## 2022-05-05 PROCEDURE — 87081 CULTURE SCREEN ONLY: CPT | Performed by: INTERNAL MEDICINE

## 2022-05-05 PROCEDURE — 88305 TISSUE EXAM BY PATHOLOGIST: CPT | Performed by: INTERNAL MEDICINE

## 2022-05-05 PROCEDURE — 25010000002 PROPOFOL 10 MG/ML EMULSION: Performed by: NURSE ANESTHETIST, CERTIFIED REGISTERED

## 2022-05-05 RX ORDER — SODIUM CHLORIDE, SODIUM LACTATE, POTASSIUM CHLORIDE, CALCIUM CHLORIDE 600; 310; 30; 20 MG/100ML; MG/100ML; MG/100ML; MG/100ML
30 INJECTION, SOLUTION INTRAVENOUS CONTINUOUS PRN
Status: DISCONTINUED | OUTPATIENT
Start: 2022-05-05 | End: 2022-05-05 | Stop reason: HOSPADM

## 2022-05-05 RX ORDER — PROPOFOL 10 MG/ML
VIAL (ML) INTRAVENOUS AS NEEDED
Status: DISCONTINUED | OUTPATIENT
Start: 2022-05-05 | End: 2022-05-05 | Stop reason: SURG

## 2022-05-05 RX ORDER — LIDOCAINE HYDROCHLORIDE 20 MG/ML
INJECTION, SOLUTION INFILTRATION; PERINEURAL AS NEEDED
Status: DISCONTINUED | OUTPATIENT
Start: 2022-05-05 | End: 2022-05-05 | Stop reason: SURG

## 2022-05-05 RX ORDER — PROPOFOL 10 MG/ML
VIAL (ML) INTRAVENOUS CONTINUOUS PRN
Status: DISCONTINUED | OUTPATIENT
Start: 2022-05-05 | End: 2022-05-05 | Stop reason: SURG

## 2022-05-05 RX ADMIN — Medication 100 MG: at 09:13

## 2022-05-05 RX ADMIN — SODIUM CHLORIDE, POTASSIUM CHLORIDE, SODIUM LACTATE AND CALCIUM CHLORIDE 30 ML/HR: 600; 310; 30; 20 INJECTION, SOLUTION INTRAVENOUS at 08:38

## 2022-05-05 RX ADMIN — LIDOCAINE HYDROCHLORIDE 100 MG: 20 INJECTION, SOLUTION INFILTRATION; PERINEURAL at 09:13

## 2022-05-05 RX ADMIN — PROPOFOL 140 MCG/KG/MIN: 10 INJECTION, EMULSION INTRAVENOUS at 09:13

## 2022-05-05 NOTE — ANESTHESIA POSTPROCEDURE EVALUATION
"Patient: Kelle Soliz    Procedure Summary     Date: 05/05/22 Room / Location: [facility] ENDOSCOPY 8 / [facility] ENDOSCOPY    Anesthesia Start: 0904 Anesthesia Stop: 0947    Procedures:       ESOPHAGOGASTRODUODENOSCOPY WITH BIOPSIES (N/A Esophagus)      COLONOSCOPY TO CECUM AND TERM. ILEUM (N/A ) Diagnosis:       Hiatal hernia      Gastritis      Diverticulosis      Tortuous colon      (Adenomatous polyp of colon, unspecified part of colon [D12.6])      (Family history of polyps in the colon [Z83.71])    Surgeons: Eddie Blackmon MD Provider: Casimiro Noe MD    Anesthesia Type: MAC ASA Status: 3          Anesthesia Type: MAC    Vitals  Vitals Value Taken Time   /81 05/05/22 1005   Temp     Pulse 56 05/05/22 1005   Resp 20 05/05/22 1005   SpO2 95 % 05/05/22 1005           Post Anesthesia Care and Evaluation    Patient location during evaluation: bedside  Patient participation: complete - patient participated  Level of consciousness: sleepy but conscious  Pain score: 0  Pain management: adequate  Airway patency: patent  Anesthetic complications: No anesthetic complications    Cardiovascular status: acceptable  Respiratory status: acceptable  Hydration status: acceptable    Comments: /81   Pulse 56   Resp 20   Ht 157.5 cm (62\")   Wt 106 kg (233 lb)   SpO2 95%   BMI 42.62 kg/m²         "

## 2022-05-05 NOTE — H&P
[facility]  Pre Procedure History & Physical    Chief Complaint:   Upper abdominal pain, history of polyps, family history    Subjective     HPI:   67-year-old female presents the endoscopy suite for upper and lower endoscopic evaluation.  She has a history of left upper quadrant abdominal pain as well as family history of polyps and a personal history of adenomatous polyps.  Last colonoscopy performed in 2018.    Past Medical History:   Past Medical History:   Diagnosis Date   • Anemia in mid-40s   • Arthritis    • Cholelithiasis around    • Colon polyp     several each colonoscopy, last 10 years   • Diabetes mellitus (HCC)     pre-diabetic, for several years   • Diastolic dysfunction    • Disease of thyroid gland    • Diverticulosis    • Diverticulosis    • Fatty liver     slightly fatty   • GERD (gastroesophageal reflux disease)    • H. pylori infection    • Heart murmur    • Hemorrhoid    • Hernia     slight hiatal hernia   • Hx of colonic polyp    • Hyperlipidemia    • Hypertension    • Hypokalemia    • Hyponatremia    • Pericardial effusion    • PSVT (paroxysmal supraventricular tachycardia) (HCC)    • Sleep apnea     cpap - does not use       Past Surgical History:  Past Surgical History:   Procedure Laterality Date   • ABDOMINAL SURGERY  around     gallbladder (~), ovaries removed (~)   • CARDIAC CATHETERIZATION     •  SECTION      x2   • COLONOSCOPY N/A 3/6/2018    Procedure: COLONOSCOPY to terminal ileum with polypectomy (cold);  Surgeon: Eddie ELIZABETH MD;  Location: [facility] ENDOSCOPY;  Service:    • COLONOSCOPY W/ POLYPECTOMY     • ENDOSCOPY N/A 2020    Procedure: ESOPHAGOGASTRODUODENOSCOPYWITH COLD BIOPSIES;  Surgeon: Kirby Dee MD;  Location: [facility] ENDOSCOPY;  Service: Gastroenterology;  Laterality: N/A;  PRE: NAUSEA  AND VOMITTING  POST: GASTRITIS   • FOOT ARTHRODESIS, MODIFIED KHAN Left    • KNEE ARTHROPLASTY Right    •  LAPAROSCOPIC CHOLECYSTECTOMY     • LAPAROSCOPIC OOPHORECTOMY Bilateral    • TOTAL KNEE ARTHROPLASTY Left 3/28/2019    Procedure: LEFT TOTAL KNEE ARTHROPLASTY;  Surgeon: Neymar Hendricks MD;  Location: [facility] OR;  Service: Orthopedics       Family History:  Family History   Problem Relation Age of Onset   • Heart disease Mother    • Hypertension Mother    • Diabetes Mother    • Heart disease Father    • Stroke Father         carotid artery disease   • Hyperlipidemia Father    • Alcohol abuse Father    • Heart disease Brother    • Diabetes Brother    • Hyperlipidemia Brother    • Hypertension Maternal Grandmother    • Malig Hyperthermia Neg Hx        Social History:   reports that she has never smoked. She has never used smokeless tobacco. She reports that she does not drink alcohol and does not use drugs.    Medications:   No medications prior to admission.       Allergies:  Patient has no known allergies.    ROS:    Pertinent items are noted in HPI, all other systems reviewed and negative     Objective     There were no vitals taken for this visit.    Physical Exam   Constitutional: Pt is oriented to person, place, and time and well-developed, well-nourished, and in no distress.   Mouth/Throat: Oropharynx is clear and moist.   Neck: Normal range of motion.   Cardiovascular: Normal rate, regular rhythm and normal heart sounds.    Pulmonary/Chest: Effort normal and breath sounds normal.   Abdominal: Soft. Nontender  Skin: Skin is warm and dry.   Psychiatric: Mood, memory, affect and judgment normal.     Assessment/Plan     Diagnosis:  Upper abdominal pain  Polyps  Family history    Anticipated Surgical Procedure:  EGD, colonoscopy    The risks, benefits, and alternatives of this procedure have been discussed with the patient or the responsible party- the patient understands and agrees to proceed.

## 2022-05-05 NOTE — ANESTHESIA PREPROCEDURE EVALUATION
Anesthesia Evaluation     Patient summary reviewed and Nursing notes reviewed   NPO Solid Status: > 8 hours  NPO Liquid Status: > 4 hours           Airway   Mallampati: II  Neck ROM: full  No difficulty expected  Dental      Comment: Capped upper front teeth    Pulmonary     breath sounds clear to auscultation  (+) sleep apnea,   Cardiovascular     Rhythm: regular    (+) hypertension, valvular problems/murmurs murmur, dysrhythmias Tachycardia, pericardial effusion, hyperlipidemia,       Neuro/Psych  (+) psychiatric history Anxiety,    GI/Hepatic/Renal/Endo    (+) obesity, morbid obesity, GERD,  liver disease, thyroid problem     Musculoskeletal     Abdominal   (+) obese,    Substance History      OB/GYN          Other   arthritis,                      Anesthesia Plan    ASA 3     MAC     intravenous induction           CODE STATUS:

## 2022-05-06 LAB
LAB AP CASE REPORT: NORMAL
PATH REPORT.FINAL DX SPEC: NORMAL
PATH REPORT.GROSS SPEC: NORMAL

## 2022-05-07 LAB — UREASE TISS QL: NEGATIVE

## 2022-05-09 ENCOUNTER — TELEPHONE (OUTPATIENT)
Dept: GASTROENTEROLOGY | Facility: CLINIC | Age: 67
End: 2022-05-09

## 2022-05-09 RX ORDER — PANTOPRAZOLE SODIUM 40 MG/1
40 TABLET, DELAYED RELEASE ORAL DAILY
Qty: 30 TABLET | Refills: 11 | Status: SHIPPED | OUTPATIENT
Start: 2022-05-09 | End: 2022-07-13 | Stop reason: SDUPTHER

## 2022-05-09 NOTE — TELEPHONE ENCOUNTER
Call to pt.  Advise per path report that duodenal bx benign. Stomach body with nonspecific gastritis.  GE junction with Rojas's.     Advise per DR Blackmon note.  Verb understanding.    Esribe completed for pantoprazole 40 mg 1 tab po daily, #30, R11.     EGD for 5/5/25 placed in recall and HM.  O/v for 5/5/23 placed in recall.

## 2022-05-09 NOTE — TELEPHONE ENCOUNTER
----- Message from Eddie ELIZABETH MD sent at 5/8/2022 11:07 AM EDT -----  Regarding: Biopsy results  Okay to call results, would offer treatment with proton pump inhibitor and consider follow-up EGD in 3 years time.  Office follow-up annually or sooner as needed.  ----- Message -----  From: Lab, Background User  Sent: 5/6/2022   9:46 AM EDT  To: Eddie ELIZABETH MD

## 2022-06-08 DIAGNOSIS — I10 ESSENTIAL HYPERTENSION: ICD-10-CM

## 2022-06-08 RX ORDER — AMLODIPINE BESYLATE 5 MG/1
5 TABLET ORAL DAILY
Qty: 90 TABLET | Refills: 0 | Status: SHIPPED | OUTPATIENT
Start: 2022-06-08 | End: 2022-09-07

## 2022-07-06 ENCOUNTER — OFFICE VISIT (OUTPATIENT)
Dept: SLEEP MEDICINE | Facility: HOSPITAL | Age: 67
End: 2022-07-06

## 2022-07-06 VITALS
BODY MASS INDEX: 41.99 KG/M2 | OXYGEN SATURATION: 96 % | HEART RATE: 58 BPM | HEIGHT: 63 IN | WEIGHT: 237 LBS | SYSTOLIC BLOOD PRESSURE: 149 MMHG | DIASTOLIC BLOOD PRESSURE: 73 MMHG

## 2022-07-06 DIAGNOSIS — G47.33 OBSTRUCTIVE SLEEP APNEA: Primary | ICD-10-CM

## 2022-07-06 DIAGNOSIS — E66.01 MORBID OBESITY: ICD-10-CM

## 2022-07-06 PROCEDURE — G0463 HOSPITAL OUTPT CLINIC VISIT: HCPCS

## 2022-07-06 PROCEDURE — 99213 OFFICE O/P EST LOW 20 MIN: CPT | Performed by: FAMILY MEDICINE

## 2022-07-06 NOTE — PROGRESS NOTES
Follow Up Sleep Disorders Center Note     Chief Complaint:  DELIA     Primary Care Physician: Ame Dempsey MD    Kelle Soliz is a 67 y.o.female  was last seen at [facility] sleep lab: 2/1/2022.  HST May 2016 showed AHI 11.5 supine AHI 42 started auto CPAP 6-20 cm H2O.  At last visit overall AHI was 1.1.  Had a nasal pillow mask.  Was in need of new supplies.  Presents today for 6-month follow-up.  Bedtime 1:30 AM wake time 9 AM sleep 7 to 8 hours ESS of 7.  No problems with mask machine still waiting on replacement machine from [employer].  Does not use OP machine.    Results Review:  DME is naps.  Downloads between 4/7/2022 - 7/5/2022.  Average usage is 7 hours 19 minutes.  Average AHI is 0.9.  Average AutoCPAP pressure is 6.8 cm H2O.    Current Medications:    Current Outpatient Medications:   •  amLODIPine (NORVASC) 5 MG tablet, TAKE 1 TABLET BY MOUTH DAILY., Disp: 90 tablet, Rfl: 0  •  cholecalciferol (VITAMIN D3) 25 MCG (1000 UT) tablet, Take 1,000 Units by mouth Daily., Disp: , Rfl:   •  clobetasol (TEMOVATE) 0.05 % cream, Apply  topically to the appropriate area as directed 2 (Two) Times a Day., Disp: , Rfl:   •  EDARBI 80 MG tablet tablet, Take 80 mg by mouth Daily., Disp: , Rfl:   •  escitalopram (LEXAPRO) 10 MG tablet, Take 10 mg by mouth Daily., Disp: , Rfl:   •  estradiol (VAGIFEM) 10 MCG tablet vaginal tablet, Insert 1 tablet into the vagina 2 (Two) Times a Week., Disp: , Rfl:   •  levothyroxine (SYNTHROID, LEVOTHROID) 88 MCG tablet, Take 88 mcg by mouth Daily., Disp: , Rfl:   •  montelukast (SINGULAIR) 10 MG tablet, Take 10 mg by mouth Every Morning., Disp: , Rfl:   •  nebivolol (BYSTOLIC) 10 MG tablet, Take 1 tablet by mouth Daily. (Patient taking differently: Take 20 mg by mouth Daily.), Disp: 90 tablet, Rfl: 1  •  pantoprazole (PROTONIX) 40 MG EC tablet, Take 1 tablet by mouth Daily., Disp: 30 tablet, Rfl: 11  •  rosuvastatin (CRESTOR) 40 MG tablet, Take 40 mg by mouth Daily., Disp: , Rfl:    also  "entered in Sleep Questionnaire    Patient  has a past medical history of Anemia (in mid-40s), Arthritis, Cholelithiasis (around 2000), Colon polyp, Diastolic dysfunction, Disease of thyroid gland, Diverticulosis, Diverticulosis, Fatty liver, GERD (gastroesophageal reflux disease), H. pylori infection, Heart murmur, Hemorrhoid, Hernia, colonic polyp, Hyperlipidemia, Hypertension, Hypokalemia, Hyponatremia, Pericardial effusion, PSVT (paroxysmal supraventricular tachycardia) (HCC), and Sleep apnea.    Social History:    Social History     Socioeconomic History   • Marital status:    Tobacco Use   • Smoking status: Never Smoker   • Smokeless tobacco: Never Used   Substance and Sexual Activity   • Alcohol use: No   • Drug use: No   • Sexual activity: Defer     Partners: Male       Allergies:  Patient has no known allergies.    Vital Signs:    Vitals:    07/06/22 0900   BP: 149/73   Pulse: 58   SpO2: 96%   Weight: 108 kg (237 lb)   Height: 160 cm (62.99\")     Body mass index is 41.99 kg/m².    REVIEW OF SYSTEMS.  Full review of systems available on the intake form which is scanned in the media tab.  The relevant positive are noted below  1. Daytime excessive sleepiness with [place] Sleepiness Scale :Total score: 7   2. Snoring  3. All negative      Physical exam:  Vitals:    07/06/22 0900   BP: 149/73   Pulse: 58   SpO2: 96%   Weight: 108 kg (237 lb)   Height: 160 cm (62.99\")    Body mass index is 41.99 kg/m².    HEENT: Head is atraumatic, normocephalic  Eyes: pupils are round equal and reacting to light and accommodation, conjunctiva normal  Throat: tongue normal, oral airway Mallampati class 4  RESPIRATORY SYSTEM: Regular respirations  CARDIOVASULAR SYSTEM: Regular rate  EXTREMITES: No cyanosis, clubbing  NEUROLOGICAL SYSTEM: Oriented x 3, no gross motor defects, gait normal    Impression:  1. Obstructive sleep apnea    2. Morbid obesity (HCC)        Obstructive sleep apnea adequately treated with auto CPAP 6-20 " cm H2O with good compliance and usage and no complaints of hypersomnolence.  Return to clinic in 1 year for follow-up or sooner if needed.    Patient uses the CPAP device and benefits from its use in terms of reduction of hypersomnia and snoring.Weight loss will be strongly beneficial to reduce the severity of sleep-disordered breathing.  Caution during activities that require prolonged concentration is strongly advised if sleepiness returns. Changing of PAP supplies regularly is important for effective use. Patient needs to change cushion on the mask or plugs on nasal pillows along with disposable filters once every month and change mask frame, tubing, headgear and Velcro straps every 6 months at the minimum.    Annabel Loaiza MD  Sleep Medicine  07/06/22  10:27 EDT

## 2022-07-13 ENCOUNTER — OFFICE VISIT (OUTPATIENT)
Dept: GASTROENTEROLOGY | Facility: CLINIC | Age: 67
End: 2022-07-13

## 2022-07-13 VITALS
DIASTOLIC BLOOD PRESSURE: 81 MMHG | BODY MASS INDEX: 43.24 KG/M2 | TEMPERATURE: 96.1 F | WEIGHT: 235 LBS | SYSTOLIC BLOOD PRESSURE: 128 MMHG | HEIGHT: 62 IN | HEART RATE: 56 BPM

## 2022-07-13 DIAGNOSIS — K21.00 GASTROESOPHAGEAL REFLUX DISEASE WITH ESOPHAGITIS WITHOUT HEMORRHAGE: Primary | ICD-10-CM

## 2022-07-13 DIAGNOSIS — R10.10 PAIN OF UPPER ABDOMEN: ICD-10-CM

## 2022-07-13 DIAGNOSIS — Z87.19 HISTORY OF GASTRITIS: ICD-10-CM

## 2022-07-13 DIAGNOSIS — D12.6 ADENOMATOUS POLYP OF COLON, UNSPECIFIED PART OF COLON: ICD-10-CM

## 2022-07-13 DIAGNOSIS — K22.70 BARRETT'S ESOPHAGUS WITHOUT DYSPLASIA: ICD-10-CM

## 2022-07-13 PROCEDURE — 99214 OFFICE O/P EST MOD 30 MIN: CPT | Performed by: NURSE PRACTITIONER

## 2022-07-13 RX ORDER — PANTOPRAZOLE SODIUM 40 MG/1
40 TABLET, DELAYED RELEASE ORAL
Qty: 60 TABLET | Refills: 0 | Status: SHIPPED | OUTPATIENT
Start: 2022-07-13 | End: 2022-08-24

## 2022-07-13 NOTE — PROGRESS NOTES
Chief Complaint   Patient presents with   • Hiatal Hernia   • barretts esophagus   • gastritis       Kelle Soliz is a  67 y.o. female here for a follow up visit for GERD.    HPI  67-year-old female presents today for follow-up visit for GERD.  She is a patient of Dr. Blackmon.  She was last seen in the office by me on 4/27/2022.  She underwent EGD and colonoscopy on 5/5/2022.  EGD showed esophagitis, gastritis with a hiatal hernia.  Path was positive for Rojas's esophagus not dysplasia.  Colonoscopy showed diverticulosis and a tortuous colon.  Recall in 5 years.  Patient does have a history of H. pylori infection and did finish treatment for it.  Repeat testing was negative.  She does have a history of hepatic steatosis and most recent LFTs were normal.  She is on Protonix 40 mg daily.  She tells me the first few weeks she was on it she felt fabulous.  Unfortunately it is starting to wear off and she is having more breakthrough reflux symptoms.  Especially in the evening and at night.  She does report her bowels are moving well.  She is trying to really eliminate some GERD trigger foods and drinks.  She is trying to eat healthier.  She admits she knows she could lose some weight.  She does have a history of adenomatous colon polyps.  She does have history of cholecystectomy.  She denies any dysphagia, nausea and vomiting, diarrhea, rectal bleeding or melena.  She admits her appetite is good and her weight is stable.  She denies any significant GI family history at this time.  Past Medical History:   Diagnosis Date   • Anemia in mid-40s   • Arthritis    • Rojas esophagus May 2022   • Cholelithiasis around 2000   • Colon polyp     several each colonoscopy, last 10 years   • Diastolic dysfunction    • Disease of thyroid gland    • Diverticulosis    • Diverticulosis    • Fatty liver     slightly fatty   • GERD (gastroesophageal reflux disease)    • H. pylori infection    • Heart murmur    • Hemorrhoid    •  Hernia     slight hiatal hernia   • Hx of colonic polyp    • Hyperlipidemia    • Hypertension    • Hypokalemia    • Hyponatremia    • Pericardial effusion    • PSVT (paroxysmal supraventricular tachycardia) (HCC)    • Sleep apnea     cpap - does not use       Past Surgical History:   Procedure Laterality Date   • ABDOMINAL SURGERY  around     gallbladder (~), ovaries removed (~)   • CARDIAC CATHETERIZATION     •  SECTION      x2   • COLONOSCOPY N/A 2018    Procedure: COLONOSCOPY to terminal ileum with polypectomy (cold);  Surgeon: Eddie ELIZABETH MD;  Location: [facility] ENDOSCOPY;  Service:    • COLONOSCOPY N/A 2022    Procedure: COLONOSCOPY TO CECUM AND TERM. ILEUM;  Surgeon: Eddie Blackmon MD;  Location: [facility] ENDOSCOPY;  Service: Gastroenterology;  Laterality: N/A;  PRE OP - PERS H/O POLYPS, FAMILY H/O POLYPS  POST OP- DIVERTICULOSIS, TORTUOUS COLON   • COLONOSCOPY W/ POLYPECTOMY     • ENDOSCOPY N/A 2020    Procedure: ESOPHAGOGASTRODUODENOSCOPYWITH COLD BIOPSIES;  Surgeon: Kirby Dee MD;  Location: [facility] ENDOSCOPY;  Service: Gastroenterology;  Laterality: N/A;  PRE: NAUSEA  AND VOMITTING  POST: GASTRITIS   • ENDOSCOPY N/A 2022    Procedure: ESOPHAGOGASTRODUODENOSCOPY WITH BIOPSIES;  Surgeon: Eddie Blackmon MD;  Location: [facility] ENDOSCOPY;  Service: Gastroenterology;  Laterality: N/A;  PRE OP - H/O H.PYLORI,  ABD PAIN  POST OP - MILD ESOPHAGITIS, MOD GASTRITIS, HIATAL HERNIA, BILE REFLUX   • FOOT ARTHRODESIS, MODIFIED KHAN Left    • KNEE ARTHROPLASTY Right    • LAPAROSCOPIC CHOLECYSTECTOMY     • LAPAROSCOPIC OOPHORECTOMY Bilateral    • TOTAL KNEE ARTHROPLASTY Left 2019    Procedure: LEFT TOTAL KNEE ARTHROPLASTY;  Surgeon: Neymar Hendricks MD;  Location: [facility] MAIN OR;  Service: Orthopedics   • UPPER GASTROINTESTINAL ENDOSCOPY  May 2020, May 2022       Scheduled Meds:    Continuous Infusions:No current facility-administered  medications for this visit.      PRN Meds:.    No Known Allergies    Social History     Socioeconomic History   • Marital status:    Tobacco Use   • Smoking status: Never Smoker   • Smokeless tobacco: Never Used   Substance and Sexual Activity   • Alcohol use: No   • Drug use: No   • Sexual activity: Yes     Partners: Male     Birth control/protection: None       Family History   Problem Relation Age of Onset   • Heart disease Mother    • Hypertension Mother    • Diabetes Mother    • Heart disease Father    • Stroke Father         carotid artery disease   • Hyperlipidemia Father    • Alcohol abuse Father    • Heart disease Brother    • Diabetes Brother    • Hyperlipidemia Brother    • Hypertension Maternal Grandmother    • Malig Hyperthermia Neg Hx        Review of Systems   Constitutional: Negative for appetite change, chills, diaphoresis, fatigue, fever and unexpected weight change.   HENT: Negative for nosebleeds, postnasal drip, sore throat, trouble swallowing and voice change.    Respiratory: Negative for cough, choking, chest tightness, shortness of breath, wheezing and stridor.    Cardiovascular: Negative for chest pain, palpitations and leg swelling.   Gastrointestinal: Negative for abdominal distention, abdominal pain, anal bleeding, blood in stool, constipation, diarrhea, nausea, rectal pain and vomiting.   Endocrine: Negative for polydipsia, polyphagia and polyuria.   Musculoskeletal: Negative for gait problem.   Skin: Negative for rash and wound.   Allergic/Immunologic: Negative for food allergies.   Neurological: Negative for dizziness, speech difficulty and light-headedness.   Psychiatric/Behavioral: Negative for confusion, self-injury, sleep disturbance and suicidal ideas.       Vitals:    07/13/22 1131   BP: 128/81   Pulse: 56   Temp: 96.1 °F (35.6 °C)       Physical Exam  Constitutional:       General: She is not in acute distress.     Appearance: She is well-developed. She is not  ill-appearing.   HENT:      Head: Normocephalic.   Eyes:      Pupils: Pupils are equal, round, and reactive to light.   Cardiovascular:      Rate and Rhythm: Normal rate and regular rhythm.      Heart sounds: Normal heart sounds.   Pulmonary:      Effort: Pulmonary effort is normal.      Breath sounds: Normal breath sounds.   Abdominal:      General: Bowel sounds are normal. There is no distension.      Palpations: Abdomen is soft. There is no mass.      Tenderness: There is no abdominal tenderness. There is no guarding or rebound.      Hernia: No hernia is present.   Musculoskeletal:         General: Normal range of motion.   Skin:     General: Skin is warm and dry.   Neurological:      Mental Status: She is alert and oriented to person, place, and time.   Psychiatric:         Speech: Speech normal.         Behavior: Behavior normal.         Judgment: Judgment normal.         No radiology results for the last 7 days     Diagnoses and all orders for this visit:    1. Gastroesophageal reflux disease with esophagitis without hemorrhage (Primary)    2. Rojas's esophagus without dysplasia    3. History of gastritis    4. Pain of upper abdomen  Overview:  Added automatically from request for surgery [id number]      5. Adenomatous polyp of colon, unspecified part of colon  Overview:  Added automatically from request for surgery [id number]      Other orders  -     pantoprazole (PROTONIX) 40 MG EC tablet; Take 1 tablet by mouth 2 (Two) Times a Day Before Meals.  Dispense: 60 tablet; Refill: 0     Reviewed EGD and colonoscopy results with her today.  Still having some reflux symptoms.  We will increase Protonix to 40 mg twice daily for the next couple of weeks and see how she does.  Continue GERD precautions.  Bowels seem to be moving well currently.  Continue high-fiber diet.  We will plan for repeat EGD in 2 years.  Next screening colonoscopy will be in 5 years.  Patient to call the office next week with an update.  Patient to  follow-up with me in 3 months.  Patient is agreeable to the plan.

## 2022-07-26 ENCOUNTER — TELEPHONE (OUTPATIENT)
Dept: GASTROENTEROLOGY | Facility: CLINIC | Age: 67
End: 2022-07-26

## 2022-08-24 ENCOUNTER — OFFICE VISIT (OUTPATIENT)
Dept: CARDIOLOGY | Facility: CLINIC | Age: 67
End: 2022-08-24

## 2022-08-24 VITALS
WEIGHT: 236 LBS | DIASTOLIC BLOOD PRESSURE: 72 MMHG | HEART RATE: 53 BPM | RESPIRATION RATE: 18 BRPM | SYSTOLIC BLOOD PRESSURE: 124 MMHG | HEIGHT: 62 IN | OXYGEN SATURATION: 95 % | BODY MASS INDEX: 43.43 KG/M2

## 2022-08-24 DIAGNOSIS — E78.5 DYSLIPIDEMIA: Chronic | ICD-10-CM

## 2022-08-24 DIAGNOSIS — I10 ESSENTIAL HYPERTENSION: Primary | Chronic | ICD-10-CM

## 2022-08-24 DIAGNOSIS — R00.2 PALPITATIONS: Chronic | ICD-10-CM

## 2022-08-24 PROCEDURE — 99214 OFFICE O/P EST MOD 30 MIN: CPT | Performed by: INTERNAL MEDICINE

## 2022-08-24 RX ORDER — PANTOPRAZOLE SODIUM 40 MG/1
TABLET, DELAYED RELEASE ORAL
Qty: 60 TABLET | Refills: 0 | Status: SHIPPED | OUTPATIENT
Start: 2022-08-24 | End: 2022-09-23

## 2022-08-24 NOTE — PROGRESS NOTES
"Chief Complaint  Follow-up (4 mo ), Hypertension, Hyperlipidemia, and Palpitations    Subjective    History of Present Illness      I had the pleasure of seeing Ms. Soliz in the office today.  She is a pleasant 67-year-old female with a history of hypertension, dyslipidemia, prediabetes, obstructive sleep apnea, anxiety, obesity and a history of premature family history of coronary disease.  She had an echocardiogram in June 2020 which demonstrated normal left ventricular function.  She had grade 2 diastolic dysfunction.  She had mild mitral insufficiency and trace to mild tricuspid insufficiency.  She did have a trace to small pericardial effusion.    On her last visit, she was having difficulty tolerating her rosuvastatin.  Her LDL was 61.  She is having significant arthralgias and myalgias.  We did decide for her to take a statin break to see if there was a significant rise in her LDL.  She did have a repeat lipid profile in June 2022 off of her statin.  Her HDL had decreased to 40.  Her LDL had increased to 155 and her triglycerides increased to 350.  Her total cholesterol increased from 143-261.  She has since been restarted on rosuvastatin 20 mg daily.  She does continue to have some arthralgias.  We discussed co-Q10.  She has been using co-Q10 intermittently and has not noted any significant improvement in her symptoms.    She is not complaining of chest discomfort or shortness of air.  She states she does have rare episodes of a \"pounding\" sensation when she lays on her left side.  This is not predictable or sustained.    Objective   Vital Signs:   /72   Pulse 53   Resp 18   Ht 157.5 cm (62\")   Wt 107 kg (236 lb)   SpO2 95%   BMI 43.16 kg/m²     Vitals and nursing note reviewed.   Constitutional:       General: Not in acute distress.     Appearance: Healthy appearance. Well-developed and not in distress. Not diaphoretic.   Eyes:      General: No scleral icterus.     Conjunctiva/sclera: " Conjunctivae normal.      Pupils: Pupils are equal, round, and reactive to light.   HENT:      Head: Normocephalic and atraumatic.   Neck:      Thyroid: No thyromegaly.      Lymphadenopathy: No cervical adenopathy.   Pulmonary:      Effort: Pulmonary effort is normal. No respiratory distress.      Breath sounds: Normal breath sounds. No wheezing. No rales.   Cardiovascular:      PMI at left midclavicular line. Bradycardia present. Regular rhythm. Normal S1. Normal S2.      Murmurs: There is no murmur.      No gallop. No S3 and S4 gallop. No click. No rub.   Pulses:     Intact distal pulses. No decreased pulses.   Edema:     Peripheral edema absent.   Abdominal:      General: Bowel sounds are normal. There is no distension.      Palpations: Abdomen is soft. There is no abdominal mass.      Tenderness: There is no abdominal tenderness. There is no guarding or rebound.   Musculoskeletal: Normal range of motion.      Cervical back: Normal range of motion and neck supple. Skin:     General: Skin is warm and dry.      Coloration: Skin is not pale.      Findings: No rash.   Neurological:      Mental Status: Alert, oriented to person, place, and time and oriented to person, place and time.      Coordination: Coordination normal.      Gait: Gait is intact.   Psychiatric:         Behavior: Behavior normal.         Result Review :{ Labs  Result Review  Imaging  Med Tab  Media :23}          June 2022: Total cholesterol 261, triglycerides 350, HDL 40, , hemoglobin A1c is 5.6.  TSH 1.51.  Potassium 4.2 BUN and creatinine normal.            Assessment and Plan    1. Essential hypertension  Controlled.  We did discuss her medical regimen.  Her blood pressure is actually fairly well controlled with her current regimen.  If her blood pressure does decrease she could try decreasing her amlodipine if needed.    2. Dyslipidemia  It appears clear, that Ms. Soliz does need medication to keep her lipids under control.  She  states she has been on numerous statins.  She is currently taking rosuvastatin 20 mg daily.  She has a lipid profile scheduled for September 2022 to see her level of LDL on 20 mg of rosuvastatin.  We also discussed possibilities of ezetimibe, bempedoic acid and PCSK9 inhibitors.    3. Palpitations  Palpitations are intermittent and infrequent.  No persistent pattern.  Do not feel that she needs a monitor at this juncture        Follow Up   No follow-ups on file.  Patient was given instructions and counseling regarding her condition or for health maintenance advice. Please see specific information pulled into the AVS if appropriate.

## 2022-09-07 DIAGNOSIS — I10 ESSENTIAL HYPERTENSION: ICD-10-CM

## 2022-09-07 RX ORDER — AMLODIPINE BESYLATE 5 MG/1
5 TABLET ORAL DAILY
Qty: 90 TABLET | Refills: 3 | Status: SHIPPED | OUTPATIENT
Start: 2022-09-07

## 2022-09-23 RX ORDER — PANTOPRAZOLE SODIUM 40 MG/1
TABLET, DELAYED RELEASE ORAL
Qty: 60 TABLET | Refills: 0 | Status: SHIPPED | OUTPATIENT
Start: 2022-09-23 | End: 2022-10-25

## 2022-10-20 ENCOUNTER — OFFICE VISIT (OUTPATIENT)
Dept: GASTROENTEROLOGY | Facility: CLINIC | Age: 67
End: 2022-10-20

## 2022-10-20 VITALS
WEIGHT: 237.8 LBS | DIASTOLIC BLOOD PRESSURE: 75 MMHG | HEART RATE: 71 BPM | SYSTOLIC BLOOD PRESSURE: 126 MMHG | HEIGHT: 62 IN | OXYGEN SATURATION: 94 % | BODY MASS INDEX: 43.76 KG/M2 | TEMPERATURE: 97.5 F

## 2022-10-20 DIAGNOSIS — K21.9 GASTROESOPHAGEAL REFLUX DISEASE, UNSPECIFIED WHETHER ESOPHAGITIS PRESENT: Primary | ICD-10-CM

## 2022-10-20 DIAGNOSIS — K22.70 BARRETT'S ESOPHAGUS WITHOUT DYSPLASIA: ICD-10-CM

## 2022-10-20 DIAGNOSIS — K63.5 POLYP OF COLON, UNSPECIFIED PART OF COLON, UNSPECIFIED TYPE: ICD-10-CM

## 2022-10-20 PROCEDURE — 99214 OFFICE O/P EST MOD 30 MIN: CPT | Performed by: INTERNAL MEDICINE

## 2022-10-20 RX ORDER — NEBIVOLOL 20 MG/1
20 TABLET ORAL DAILY
COMMUNITY
Start: 2022-10-10

## 2022-10-20 RX ORDER — SUCRALFATE ORAL 1 G/10ML
1 SUSPENSION ORAL 4 TIMES DAILY
Qty: 1200 ML | Refills: 5 | Status: SHIPPED | OUTPATIENT
Start: 2022-10-20

## 2022-10-20 NOTE — PROGRESS NOTES
Chief Complaint   Patient presents with   • Heartburn     HX Rojas's esophagus   • Abdominal Pain        Kelle Soliz is a  67 y.o. female here for a follow up visit for GERD, Rojas's esophagus, history of polyps    HPI this 67-year-old white female patient of Dr. Ame Dempsey presents with continued complaints of reflux despite double strength pantoprazole.  She admits that she is not adhering to antireflux measures to some degree.  We talked about further treatment options and will add Carafate suspension with meals and at bedtime.  She is to call with a progress report in 6 to 8 weeks time.  If her symptoms persist then we may need to consider further work-up to see if there are other sources to explain her epigastric and left upper quadrant discomfort.  We did review her endoscopic findings from May of this year.  She has a history of Rojas's esophagus and will warrant treatment on a routine basis.  She will not require follow-up colonoscopy for 5 years time.    Past Medical History:   Diagnosis Date   • Anemia in mid-40s   • Arthritis    • Rojas esophagus May 2022   • Cholelithiasis around 2000   • Colon polyp     several each colonoscopy, last 10 years   • Diastolic dysfunction    • Disease of thyroid gland    • Diverticulosis    • Diverticulosis    • Fatty liver     slightly fatty   • GERD (gastroesophageal reflux disease)    • H. pylori infection    • Heart murmur    • Hemorrhoid    • Hernia     slight hiatal hernia   • Hx of colonic polyp    • Hyperlipidemia    • Hypertension    • Hypokalemia    • Hyponatremia    • Pericardial effusion    • PSVT (paroxysmal supraventricular tachycardia) ([facility])    • Sleep apnea     cpap - does not use       Current Outpatient Medications   Medication Sig Dispense Refill   • amLODIPine (NORVASC) 5 MG tablet TAKE 1 TABLET BY MOUTH DAILY. 90 tablet 3   • cholecalciferol (VITAMIN D3) 25 MCG (1000 UT) tablet Take 1,000 Units by mouth Daily.     • EDARBI 80 MG tablet  tablet Take 80 mg by mouth Daily.     • escitalopram (LEXAPRO) 10 MG tablet Take 10 mg by mouth Daily.     • estradiol (VAGIFEM) 10 MCG tablet vaginal tablet Insert 1 tablet into the vagina 2 (Two) Times a Week.     • levothyroxine (SYNTHROID, LEVOTHROID) 88 MCG tablet Take 88 mcg by mouth Daily.     • montelukast (SINGULAIR) 10 MG tablet Take 10 mg by mouth Every Morning.     • nebivolol (BYSTOLIC) 20 MG tablet Take 1 tablet by mouth Daily.     • pantoprazole (PROTONIX) 40 MG EC tablet TAKE 1 TABLET BY MOUTH TWICE DAILY BEFORE MEAL(S) 60 tablet 0   • rosuvastatin (CRESTOR) 40 MG tablet Take 20 mg by mouth Daily.       No current facility-administered medications for this visit.       PRN Meds:.    No Known Allergies    Social History     Socioeconomic History   • Marital status:    Tobacco Use   • Smoking status: Never   • Smokeless tobacco: Never   Substance and Sexual Activity   • Alcohol use: No   • Drug use: No   • Sexual activity: Yes     Partners: Male     Birth control/protection: None       Family History   Problem Relation Age of Onset   • Heart disease Mother    • Hypertension Mother    • Diabetes Mother    • Heart disease Father    • Stroke Father         carotid artery disease   • Hyperlipidemia Father    • Alcohol abuse Father    • Heart disease Brother    • Diabetes Brother    • Hyperlipidemia Brother    • Hypertension Maternal Grandmother    • Malig Hyperthermia Neg Hx        Review of Systems   Constitutional: Negative for activity change, appetite change, fatigue and unexpected weight change.   HENT: Negative for congestion, facial swelling, sore throat, trouble swallowing and voice change.    Eyes: Negative for photophobia and visual disturbance.   Respiratory: Negative for cough and choking.    Cardiovascular: Negative for chest pain.   Gastrointestinal: Positive for abdominal pain. Negative for abdominal distention, anal bleeding, blood in stool, constipation, diarrhea, nausea, rectal  pain and vomiting.        GERD   Endocrine: Negative for polyphagia.   Musculoskeletal: Negative for arthralgias, gait problem and joint swelling.   Skin: Negative for color change, pallor and rash.   Allergic/Immunologic: Negative for food allergies.   Neurological: Negative for speech difficulty and headaches.   Hematological: Does not bruise/bleed easily.   Psychiatric/Behavioral: Negative for agitation, confusion and sleep disturbance.       Vitals:    10/20/22 1518   BP: 126/75   Pulse: 71   Temp: 97.5 °F (36.4 °C)   SpO2: 94%       Physical Exam  Constitutional:       Appearance: She is well-developed. She is obese.   HENT:      Head: Normocephalic.   Eyes:      Conjunctiva/sclera: Conjunctivae normal.   Cardiovascular:      Rate and Rhythm: Normal rate and regular rhythm.   Pulmonary:      Breath sounds: Normal breath sounds.   Abdominal:      General: Bowel sounds are normal.      Palpations: Abdomen is soft.   Musculoskeletal:         General: Normal range of motion.      Cervical back: Normal range of motion.   Skin:     General: Skin is warm and dry.   Neurological:      Mental Status: She is alert and oriented to person, place, and time.   Psychiatric:         Behavior: Behavior normal.         ASSESSMENT  #1 GERD  #2 Rojas's esophagus  #3 history of polyps      PLAN  Continue PPI  Carafate suspension before meals and at bedtime  Call with a progress report in 1 and half to 2 months      ICD-10-CM ICD-9-CM   1. Gastroesophageal reflux disease, unspecified whether esophagitis present  K21.9 530.81   2. Rojas's esophagus without dysplasia  K22.70 530.85   3. Polyp of colon, unspecified part of colon, unspecified type  K63.5 211.3

## 2022-10-25 RX ORDER — PANTOPRAZOLE SODIUM 40 MG/1
TABLET, DELAYED RELEASE ORAL
Qty: 60 TABLET | Refills: 3 | Status: SHIPPED | OUTPATIENT
Start: 2022-10-25

## 2022-12-15 NOTE — CONSULTS
[facility]                                                                                             Nephrology Initial Consult Note    Patient Identification:  Name: Kelle Soliz MRN: [id number]  Age: 65 y.o. : 1955  Sex: female  Date:2020    Requesting Physician: As per consult order.  Reason for Consultation: Hyponatremia, acute kidney injury  Information from:patient/ family/ chart      History of Present Illness: This is a 65 y.o. year old female with no prior known history of chronic kidney disease or renal failure, recent creatinine levels have ranged from 0.8-1.0.  Creatinine was 1.1 on arrival and 0.7 this morning.  Her sodium was 124 on , 125 yesterday but has actually been running in the mid 120s since at least 3/29, prior to this was 133 on .  She has a history of hypertension, normally very well controlled on a combination of Bystolic and Edarbychlor which contains HCTZ 25 mg.  She came to the hospital and was admitted on  with complaints of weight loss decreased appetite increasing weakness, episodes of confusion at home.  She also reports diarrhea several weeks ago, states compliance with her blood pressure medications.  Her blood pressures normally 1 20-1 30 systolic but she states is been running as low as  systolic.  She was orthostatic, no improvement with IV fluids or holding the chlorthalidone.  Potassium is also low and that has been replaced.  Work-up so far shows borderline low uric acid, initial low urine sodium and urine osmolality 310.      The following medical history and medications personally reviewed by me:    Problem List:   Patient Active Problem List    Diagnosis   • Hyponatremia [E87.1]   • Hypokalemia [E87.6]   • GERD without esophagitis [K21.9]   • DELIA (obstructive sleep apnea) [G47.33]   • Nausea [R11.0]   • Loss of appetite  R63.0]   • DJD (degenerative joint disease) of knee [M17.10]   • Chest pain [R07.9]   • Hx of colonic polyps [Z86.010]   • Hypersomnia with sleep apnea [G47.10, G47.30]   • Snoring [R06.83]       Past Medical History:  Past Medical History:   Diagnosis Date   • Anemia in mid-40s   • Arthritis    • Cholelithiasis around    • Colon polyp     several each colonoscopy, last 10 years   • Diabetes mellitus (CMS/HCC)     pre-diabetic, for several years   • Disease of thyroid gland    • Diverticulosis    • Fatty liver     slightly fatty   • GERD (gastroesophageal reflux disease)    • Hemorrhoid    • Hernia     slight hiatal hernia   • Hx of colonic polyp    • Hyperlipidemia    • Hypertension    • Hypokalemia    • Hyponatremia    • Sleep apnea     cpap - does not use       Past Surgical History:  Past Surgical History:   Procedure Laterality Date   • ABDOMINAL SURGERY  around     gallbladder (~), ovaries removed (~)   • CARDIAC CATHETERIZATION     •  SECTION      x2   • COLONOSCOPY N/A 3/6/2018    Procedure: COLONOSCOPY to terminal ileum with polypectomy (cold);  Surgeon: Eddie ELIZABETH MD;  Location: [facility] ENDOSCOPY;  Service:    • COLONOSCOPY W/ POLYPECTOMY     • FOOT ARTHRODESIS, MODIFIED KHAN Left    • KNEE ARTHROPLASTY Right    • LAPAROSCOPIC CHOLECYSTECTOMY     • LAPAROSCOPIC OOPHORECTOMY Bilateral    • TOTAL KNEE ARTHROPLASTY Left 3/28/2019    Procedure: LEFT TOTAL KNEE ARTHROPLASTY;  Surgeon: Neymar Hendricks MD;  Location: [facility] OR;  Service: Orthopedics        Home Meds:   Medications Prior to Admission   Medication Sig Dispense Refill Last Dose   • buPROPion XL (WELLBUTRIN XL) 300 MG 24 hr tablet Take 300 mg by mouth Every Morning.   Past Week at Unknown time   • CLOBETASOL PROPIONATE EX Apply 1 application topically 2 (Two) Times a Week.   Past Week at Unknown time   • EDARBYCLOR 40-25 MG tablet Take 1 tablet by mouth Every Morning.  7 Past Week at Unknown time   •  [email or web address].[email or web address]  estradiol (ESTRACE) 0.1 MG/GM vaginal cream Insert 2 g into the vagina 1 (One) Time Per Week.   Past Week at Unknown time   • famotidine (PEPCID) 10 MG tablet Take 2 tablets by mouth 2 (Two) Times a Day. 30 tablet 0    • levothyroxine (SYNTHROID, LEVOTHROID) 88 MCG tablet Take 88 mcg by mouth Daily.   Past Week at Unknown time   • Melatonin 10 MG sublingual tablet Place 10 mg under the tongue At Night As Needed.   Past Week at Unknown time   • montelukast (SINGULAIR) 10 MG tablet Take 10 mg by mouth Every Morning.   Past Week at Unknown time   • nebivolol (BYSTOLIC) 20 MG tablet Take 40 mg by mouth Daily.   Past Week at Unknown time   • rosuvastatin (CRESTOR) 40 MG tablet Take 40 mg by mouth Daily.   Past Week at Unknown time   • Scopolamine (TRANSDERM-SCOP) 1.5 MG/3DAYS patch Place 1 patch on the skin as directed by provider Every 72 (Seventy-Two) Hours As Needed (dizziness). 6 patch 0    • acetaminophen (TYLENOL) 500 MG tablet Take 1,000 mg by mouth Every 6 (Six) Hours As Needed for Mild Pain .   More than a month at Unknown time   • ALPRAZolam (XANAX) 0.25 MG tablet Take 0.25 mg by mouth As Needed (flying).   Past Month at Unknown time   • amoxicillin (AMOXIL) 500 MG capsule TAKE FOUR CAPSULES BY MOUTH ONE HOUR BEFORE APPOINTMENT      • cholecalciferol (VITAMIN D3) 1000 units tablet Take 1,000 Units by mouth Daily.   Past Week at Unknown time   • ondansetron ODT (Zofran ODT) 4 MG disintegrating tablet Place 1 tablet on the tongue Every 8 (Eight) Hours As Needed for Nausea or Vomiting. 15 tablet 0    • potassium chloride (K-DUR) 10 MEQ CR tablet Take 2 tablets by mouth Daily. 10 tablet 0    • potassium chloride ER (K-TAB) 20 MEQ tablet controlled-release ER tablet Take 20 mEq by mouth 2 (Two) Times a Day.      • sertraline (ZOLOFT) 50 MG tablet Take 50 mg by mouth As Needed (flying).   More than a month at Unknown time       Current Meds:   Current Facility-Administered Medications   Medication Dose Route  Frequency Provider Last Rate Last Dose   • acetaminophen (TYLENOL) tablet 650 mg  650 mg Oral Q4H PRN Jayde Parker MD        Or   • acetaminophen (TYLENOL) 160 MG/5ML solution 650 mg  650 mg Oral Q4H PRN Jayde Parker MD        Or   • acetaminophen (TYLENOL) suppository 650 mg  650 mg Rectal Q4H PRN Jayde Parker MD       • ALPRAZolam (XANAX) tablet 0.25 mg  0.25 mg Oral BID PRN Jayde Parker MD   0.25 mg at 05/01/20 0201   • bisacodyl (DULCOLAX) EC tablet 5 mg  5 mg Oral Daily PRN Jayde Parker MD   5 mg at 04/30/20 1716   • bisacodyl (DULCOLAX) suppository 10 mg  10 mg Rectal Daily PRN Jayde Parker MD       • buPROPion XL (WELLBUTRIN XL) 24 hr tablet 300 mg  300 mg Oral Daily Jayde Parker MD       • famotidine (PEPCID) tablet 20 mg  20 mg Oral BID Jayde Parker MD       • levothyroxine (SYNTHROID, LEVOTHROID) tablet 88 mcg  88 mcg Oral Q AM Jayde Parker MD   88 mcg at 05/01/20 0611   • montelukast (SINGULAIR) tablet 10 mg  10 mg Oral QAM Jayde Parker MD   10 mg at 05/01/20 0611   • nebivolol (BYSTOLIC) tablet 40 mg  40 mg Oral Daily Jayde Parker MD       • ondansetron (ZOFRAN) tablet 4 mg  4 mg Oral Q6H PRN Jayde Parker MD        Or   • ondansetron (ZOFRAN) injection 4 mg  4 mg Intravenous Q6H PRN Jayde Parker MD   4 mg at 04/30/20 1716   • potassium chloride (MICRO-K) CR capsule 40 mEq  40 mEq Oral PRN Jayde Parker MD   40 mEq at 04/30/20 1705    Or   • potassium chloride (KLOR-CON) packet 40 mEq  40 mEq Oral PRN Jayde Parker MD        Or   • potassium chloride 10 mEq in 100 mL IVPB  10 mEq Intravenous Q1H PRN Jayde Parker  mL/hr at 05/01/20 0806 10 mEq at 05/01/20 0806   • rosuvastatin (CRESTOR) tablet 40 mg  40 mg Oral Daily Jayde Parker MD       • sodium chloride 0.9 % flush 10 mL  10 mL Intravenous PRN Jayde Parker MD       •  sodium chloride 0.9 % flush 10 mL  10 mL Intravenous Q12H Jayde Parker MD       • sodium chloride 0.9 % flush 10 mL  10 mL Intravenous PRN Jayde Parker MD       • sodium chloride 0.9 % infusion  100 mL/hr Intravenous Continuous Jayde Parker  mL/hr at 04/30/20 1708 100 mL/hr at 04/30/20 1708       Allergies:  No Known Allergies    Social History:   Social History     Socioeconomic History   • Marital status:      Spouse name: Not on file   • Number of children: Not on file   • Years of education: Not on file   • Highest education level: Not on file   Tobacco Use   • Smoking status: Never Smoker   • Smokeless tobacco: Never Used   Substance and Sexual Activity   • Alcohol use: No   • Drug use: No   • Sexual activity: Yes     Partners: Male        Family History:  Family History   Problem Relation Age of Onset   • Heart disease Mother    • Hypertension Mother    • Diabetes Mother    • Heart disease Father    • Stroke Father         carotid artery disease   • Hyperlipidemia Father    • Alcohol abuse Father    • Heart disease Brother    • Diabetes Brother    • Hyperlipidemia Brother    • Hypertension Maternal Grandmother    • Malig Hyperthermia Neg Hx         Review of Systems: as per HPI, in addition:    General:      Complains of weakness / fatigue, improved                       No fevers / chills                       + Recent unintentional weight loss  HEENT:       no dysphagia / odynophagia  Neck:           normal range of motion, no swelling  Respiratory: no cough / congestion                      No shortness of air                       No wheezing  CV:              No chest pain                       No palpitations  Abdomen/GI: Complains of some nausea but no vomiting, abdominal pain but no melena or hematochezia, complains of easy satiety and some weight loss  :             no dysuria / urinary frequency                       No urgency, normal output  Endocrine:    "no polyuria / polydipsia,                      No heat or cold intolerance  Skin:           no rashes or skin breakdown   Vascular:   No edema                     No claudication  Psych:        no depression/ anxiety  Neuro:        no focal weakness, no seizures  Musculoskeletal: no joint pain or deformities      Physical Exam:  Vitals:   Temp (24hrs), Av.1 °F (36.2 °C), Min:96.7 °F (35.9 °C), Max:97.7 °F (36.5 °C)    /83 (BP Location: Right arm, Patient Position: Lying)   Pulse 70   Temp 97.1 °F (36.2 °C) (Oral)   Resp 18   Ht 157.5 cm (62.01\")   Wt 91.7 kg (202 lb 1.6 oz)   SpO2 100%   BMI 36.96 kg/m²   Intake/Output:     Intake/Output Summary (Last 24 hours) at 2020 0949  Last data filed at 2020 0103  Gross per 24 hour   Intake 863.33 ml   Output 1700 ml   Net -836.67 ml        Wt Readings from Last 1 Encounters:   20 0409 91.7 kg (202 lb 1.6 oz)   20 0615 89.2 kg (196 lb 11.2 oz)   20 2111 93 kg (205 lb)       Exam:    General Appearance:  Awake, alert, oriented x3, no acute distress  Fairly well-appearing   Head and Face:  Normocephalic, atraumatic, mucus membranes moist, oropharynx clear   Eyes:  No icterus, pupils equal round and reactive to light, extraocular movements intact    ENMT: Moist mucosa, tongue symmetric    Neck: Supple  no jugular venous distention  no thyromegaly   Pulmonary:  Respiratory effort: Normal  Auscultation of lungs: Clear bilaterally  No wheezes  No rhonchi  Good air movement, good expansion   Chest wall:  No tenderness or deformity   Cardiovascular:  Auscultation of the heart: Normal rhythm, no murmurs  No significant edema of bilateral lower extremities   Abdomen:  Abdomen: soft, non-tender, normal bowel sounds all four quadrants, no masses   Liver and spleen: no hepatosplenomegaly   Musculoskeletal: Digits and nails: normal  Normal range of motion  No joint swelling or gross deformities    Skin: Skin inspection: color normal, no visible " rashes or lesions  Skin palpation: texture, turgor normal, no palpable lesions   Lymphatic:  no cervical lymphadenopathy    Psychiatric: Judgement and insight: normal  Orientation to person place and time: normal  Mood and affect: normal       DATA:  Radiology and Labs:  The following labs independently reviewed by me, additional AM labs ordered  Old records independently reviewed showing normal baseline creatinine, previous sodium level 133, has been low for the last month  The following radiologic studies independently viewed by me, findings chest x-ray 4/25 no active disease, KUB 4/17 normal bowel gas pattern no obstruction calcifications or significant changes  Interval notes, chart personally reviewed by me.    I have reviewed and summation of old records as detailed above  New problems include hyponatremia, nausea, weight loss, weakness      Risk/ complexity of medical care/ medical decision making:  High:  Chronic illness with severe exacerbation or progression, new, symptomatic hyponatremia      Labs:   Recent Results (from the past 24 hour(s))   Potassium    Collection Time: 04/30/20  9:18 PM   Result Value Ref Range    Potassium 4.0 3.5 - 5.2 mmol/L   Osmolality, Serum    Collection Time: 04/30/20  9:18 PM   Result Value Ref Range    Osmolality 277 (L) 280 - 301 mOsm/kg   Creatinine, Urine, Random -    Collection Time: 04/30/20  9:34 PM   Result Value Ref Range    Creatinine, Urine 48.3 mg/dL   Chloride, Urine, Random -    Collection Time: 04/30/20  9:34 PM   Result Value Ref Range    Chloride, Urine 80 mmol/L   Sodium, Urine, Random -    Collection Time: 04/30/20  9:34 PM   Result Value Ref Range    Sodium, Urine 33 mmol/L   Osmolality, Urine -    Collection Time: 04/30/20  9:34 PM   Result Value Ref Range    Osmolality, Urine 310 mOsm/kg   TSH    Collection Time: 05/01/20  5:55 AM   Result Value Ref Range    TSH 1.360 0.270 - 4.200 uIU/mL   Cortisol    Collection Time: 05/01/20  5:55 AM   Result Value  Ref Range    Cortisol 2.86   mcg/dL   Basic Metabolic Panel    Collection Time: 05/01/20  5:55 AM   Result Value Ref Range    Glucose 86 65 - 99 mg/dL    BUN 7 (L) 8 - 23 mg/dL    Creatinine 0.72 0.57 - 1.00 mg/dL    Sodium 134 (L) 136 - 145 mmol/L    Potassium 3.4 (L) 3.5 - 5.2 mmol/L    Chloride 99 98 - 107 mmol/L    CO2 24.2 22.0 - 29.0 mmol/L    Calcium 8.2 (L) 8.6 - 10.5 mg/dL    eGFR Non African Amer 81 >60 mL/min/1.73    BUN/Creatinine Ratio 9.7 7.0 - 25.0    Anion Gap 10.8 5.0 - 15.0 mmol/L   CBC (No Diff)    Collection Time: 05/01/20  5:55 AM   Result Value Ref Range    WBC 5.45 3.40 - 10.80 10*3/mm3    RBC 3.98 3.77 - 5.28 10*6/mm3    Hemoglobin 12.9 12.0 - 15.9 g/dL    Hematocrit 36.1 34.0 - 46.6 %    MCV 90.7 79.0 - 97.0 fL    MCH 32.4 26.6 - 33.0 pg    MCHC 35.7 31.5 - 35.7 g/dL    RDW 13.2 12.3 - 15.4 %    RDW-SD 43.5 37.0 - 54.0 fl    MPV 9.6 6.0 - 12.0 fL    Platelets 192 140 - 450 10*3/mm3   Magnesium    Collection Time: 05/01/20  5:55 AM   Result Value Ref Range    Magnesium 1.8 1.6 - 2.4 mg/dL       Radiology:  Imaging Results (Last 24 Hours)     ** No results found for the last 24 hours. **           ASSESSMENT:   New, hyponatremia, improved overnight with isotonic fluids and fluid restriction.  Normal cortisol and TSH.  Borderline low normal serum osmolality, borderline low normal uric acid.  Urine studies not consistent with SIADH, I think all of this is consistent with side effects from her thiazide diuretic and sodium is improving now that her thiazide has been stopped and metabolized  New hypokalemia, nutritional plus from thiazide, replaced  Dehydration  Weakness  Weight loss with early satiety and nausea, EGD planned  Volume depletion  Chronic hypertension  Recent hypotension      PLAN:   Sodium level has returned to her previous baseline levels.  Creatinine back to baseline as well  Work-up so far not consistent with SIADH, urine studies seem most consistent with thiazide  toxicity  Recommend to continue to hold her thiazide, perhaps indefinitely  Continue IV fluids until diet is restarted after EGD this afternoon  Replace potassium per protocol  Okay to restart ARB but hold HCTZ component  Continue on modest fluid restriction.  Currently n.p.o. for EGD  Thyroid and adrenal function stable, on low-dose Synthroid    Continue to monitor electrolytes and volume closely    I appreciate the consult request, please call if any questions      Tae Evans M.D  [facility]  Office phone number: [phone]  Answering service phone number: [phone]        5/1/2020        Dictation via Dragon dictation software

## 2022-12-19 ENCOUNTER — TELEPHONE (OUTPATIENT)
Dept: GASTROENTEROLOGY | Facility: CLINIC | Age: 67
End: 2022-12-19

## 2022-12-19 NOTE — TELEPHONE ENCOUNTER
----- Message from Kelle Soliz sent at 12/19/2022 12:20 PM EST -----  Regarding: Follow up from appointment 10/20/22   Contact: [phone]  I have been using sucralfate 8 weeks and Dr Blackmon wanted report at that time. Used it 4 times daily for first month  but more like 2 times daily last 4 weeks.  I just get off schedule. Still have a twinge of pain sometimes but maybe less than 1 time daily. Only lasts a minute or less. Still in middle of stomach or left side. I’m still on 40mg of pantoprazole 2 times daily. Been on that dose since mid July. Need to know   If Dr Blackmon wants me to continue sulcrafate because it is time to reorder if so. Also want to know if I am to continue 80mg of pantoprazole daily.   Thank you  Kelle Soliz

## 2022-12-19 NOTE — TELEPHONE ENCOUNTER
Eddie Blackmon MD  to Me      3:49 PM  I would encourage the patient to continue Carafate twice daily which is maintenance therapy and if able to, have the patient reduce the PPI dose to once a day.  They can call with a progress report in 2 weeks.     **Call to pt.  Advise of above.  Verb understanding.  Fatimah Jones RN.

## 2023-02-06 ENCOUNTER — TELEPHONE (OUTPATIENT)
Dept: GASTROENTEROLOGY | Facility: CLINIC | Age: 68
End: 2023-02-06
Payer: COMMERCIAL

## 2023-02-06 NOTE — TELEPHONE ENCOUNTER
----- Message from Kelle Soliz sent at 2/6/2023 12:12 PM EST -----  Regarding: Follow up from appointment 10/20/22   Contact: [phone]  Your office replied to above message saying cut both medicines in half and report back in 2 weeks. I neglected to do that and have continued with the half doses about 7-8 weeks instead. (My bad) I still have same issues. Some  pain in middle of stomach about once daily and pain on left side. Pain in center of stomach is just something I notice and then it goes away quickly. Left side pain is different. It Can occur day or night. I noticed it a lot during the day yesterday but that is not common. It is not seriously uncomfortable, maybe a 2 or 3 on 1-10 pain scale. I do wake up with it if I slide off elevated pillows at night. Please let me know how to proceed. Thank you   Kelle Soliz

## 2023-02-08 ENCOUNTER — TELEPHONE (OUTPATIENT)
Dept: GASTROENTEROLOGY | Facility: CLINIC | Age: 68
End: 2023-02-08
Payer: COMMERCIAL

## 2023-02-08 NOTE — TELEPHONE ENCOUNTER
[facility] staff attempted to follow warm transfer process and was unsuccessful     Caller: Kelle Soliz    Relationship to patient: Self    Best call back number: [phone]    Patient is needing:  TO SPEAK TO A NURSE ABOUT SYMPTOMS. PATIENT SENT A MESSAGE ON MY CHART ON Monday. SHE HAS NOT RECEIVED A RESPONSE.     [facility] MADE AN APPT WITH ANGE PENDLETON ON Monday 2/13/23.    PLEASE CALL PATIENT TO ADVISE IN THE MEANTIME ABOUT SYMPTOMS AND POSSIBLY ADJUSTING MEDICATION.

## 2023-02-09 NOTE — TELEPHONE ENCOUNTER
Called and passed on Dr Blackmon's message.    Pt has not been taking pantoprazole BID nor is she taking the Carafate AC and HS.  She understood she was to cut back on both.  She is doing the pantoprazole daily and Carafate twice a day.    I have moved her apt with stephon up to Friday for further evaluation.

## 2023-02-10 ENCOUNTER — OFFICE VISIT (OUTPATIENT)
Dept: GASTROENTEROLOGY | Facility: CLINIC | Age: 68
End: 2023-02-10
Payer: COMMERCIAL

## 2023-02-10 VITALS
DIASTOLIC BLOOD PRESSURE: 84 MMHG | HEART RATE: 59 BPM | OXYGEN SATURATION: 96 % | TEMPERATURE: 97.3 F | BODY MASS INDEX: 43.61 KG/M2 | HEIGHT: 62 IN | SYSTOLIC BLOOD PRESSURE: 135 MMHG | WEIGHT: 237 LBS

## 2023-02-10 DIAGNOSIS — K76.0 FATTY LIVER: ICD-10-CM

## 2023-02-10 DIAGNOSIS — D12.6 ADENOMATOUS POLYP OF COLON, UNSPECIFIED PART OF COLON: ICD-10-CM

## 2023-02-10 DIAGNOSIS — Z87.19 HISTORY OF BARRETT'S ESOPHAGUS: ICD-10-CM

## 2023-02-10 DIAGNOSIS — K21.00 GASTROESOPHAGEAL REFLUX DISEASE WITH ESOPHAGITIS WITHOUT HEMORRHAGE: Primary | ICD-10-CM

## 2023-02-10 PROCEDURE — 99214 OFFICE O/P EST MOD 30 MIN: CPT | Performed by: NURSE PRACTITIONER

## 2023-02-10 NOTE — PROGRESS NOTES
Chief Complaint   Patient presents with   • Heartburn       Kelle Soliz is a  68 y.o. female here for a follow up visit for GERD.    HPI  68-year-old female presents today for follow-up visit for GERD.  She is a patient of Dr. Blackmon.  She was last seen in the office by Dr. Blackmon on 10/2022.  She has a history of GERD/hiatal hernia/esophagitis/gastritis/Rojas's esophagus without dysplasia and admits that she is doing pretty good on pantoprazole 40 mg every morning.  She tells me unfortunately she will not give up her to favorite GERD trigger drinks her lattes and her tea.  She does worry that maybe those are contributing to her continued reflux symptoms.  Her reflux always seems to be worse at night.  She has tried to raise the head of her bed with multiple pillows.  This sometimes helps but a lot of times she wakes up in the middle the night having slid down the bed.  She often complains of left-sided abdominal pain.  She tells me its worse when she bends over or hits into something.  She tells me this pain comes and goes.  She is not having this pain today.  She does also admit that sometimes she has more constipation.  She feels like over the winter months that she is not as active and is not eating as much fiber so her bowels tend to be slower.  She has been using Carafate twice a day.  She does not really feel like it is helping much.  She does have a history of colon polyps.  She denies any dysphagia, nausea and vomiting, diarrhea, rectal bleeding or melena.  She admits her appetite is good and her weight is stable.  She does admit she was doing a lot better when she was on Protonix 40 mg twice a day.  She just did not think you were supposed to be on that long-term.  She does have a history of fatty liver disease.  Most recent LFTs were normal in 2020.  Past Medical History:   Diagnosis Date   • Anemia in mid-40s   • Arthritis    • Rojas esophagus May 2022   • Cholelithiasis around 2000   •  Colon polyp     several each colonoscopy, last 10 years   • Diastolic dysfunction    • Disease of thyroid gland    • Diverticulosis    • Diverticulosis    • Fatty liver     slightly fatty   • GERD (gastroesophageal reflux disease)    • H. pylori infection    • Heart murmur    • Hemorrhoid    • Hernia     slight hiatal hernia   • Hx of colonic polyp    • Hyperlipidemia    • Hypertension    • Hypokalemia    • Hyponatremia    • Pericardial effusion    • PSVT (paroxysmal supraventricular tachycardia) (HCC)    • Sleep apnea     cpap - does not use       Past Surgical History:   Procedure Laterality Date   • ABDOMINAL SURGERY  around     gallbladder (~), ovaries removed (~)   • CARDIAC CATHETERIZATION     •  SECTION      x2   • COLONOSCOPY N/A 2018    Procedure: COLONOSCOPY to terminal ileum with polypectomy (cold);  Surgeon: Eddie ELIZABETH MD;  Location:  [facility];  Service:    • COLONOSCOPY N/A 2022    Procedure: COLONOSCOPY TO CECUM AND TERM. ILEUM;  Surgeon: Eddie Blackmon MD;  Location: [facility];  Service: Gastroenterology;  Laterality: N/A;  PRE OP - PERS H/O POLYPS, FAMILY H/O POLYPS  POST OP- DIVERTICULOSIS, TORTUOUS COLON   • COLONOSCOPY W/ POLYPECTOMY     • ENDOSCOPY N/A 2020    Procedure: ESOPHAGOGASTRODUODENOSCOPYWITH COLD BIOPSIES;  Surgeon: Kirby Dee MD;  Location:  [facility];  Service: Gastroenterology;  Laterality: N/A;  PRE: NAUSEA  AND VOMITTING  POST: GASTRITIS   • ENDOSCOPY N/A 2022    Procedure: ESOPHAGOGASTRODUODENOSCOPY WITH BIOPSIES;  Surgeon: Eddie Blackmon MD;  Location:  [facility];  Service: Gastroenterology;  Laterality: N/A;  PRE OP - H/O H.PYLORI,  ABD PAIN  POST OP - MILD ESOPHAGITIS, MOD GASTRITIS, HIATAL HERNIA, BILE REFLUX   • FOOT ARTHRODESIS, MODIFIED KHAN Left    • KNEE ARTHROPLASTY Right    • LAPAROSCOPIC CHOLECYSTECTOMY     • LAPAROSCOPIC OOPHORECTOMY Bilateral    • TOTAL  KNEE ARTHROPLASTY Left 2019    Procedure: LEFT TOTAL KNEE ARTHROPLASTY;  Surgeon: Neymar Hendricks MD;  Location: [facility];  Service: Orthopedics   • UPPER GASTROINTESTINAL ENDOSCOPY  May 2020, May 2022       Scheduled Meds:    Continuous Infusions:No current facility-administered medications for this visit.      PRN Meds:.    No Known Allergies    Social History     Socioeconomic History   • Marital status:    Tobacco Use   • Smoking status: Never   • Smokeless tobacco: Never   Substance and Sexual Activity   • Alcohol use: No   • Drug use: No   • Sexual activity: Yes     Partners: Male     Birth control/protection: None       Family History   Problem Relation Age of Onset   • Heart disease Mother    • Hypertension Mother    • Diabetes Mother    • Colon polyps Mother            • Heart disease Father    • Stroke Father         carotid artery disease   • Hyperlipidemia Father    • Alcohol abuse Father    • Heart disease Brother    • Diabetes Brother    • Hyperlipidemia Brother    • Hypertension Maternal Grandmother    • Malig Hyperthermia Neg Hx        Review of Systems   Constitutional: Negative for appetite change, chills, diaphoresis, fatigue, fever and unexpected weight change.   HENT: Negative for nosebleeds, postnasal drip, sore throat, trouble swallowing and voice change.    Respiratory: Negative for cough, choking, chest tightness, shortness of breath, wheezing and stridor.    Cardiovascular: Negative for chest pain, palpitations and leg swelling.   Gastrointestinal: Positive for abdominal pain. Negative for abdominal distention, anal bleeding, blood in stool, constipation, diarrhea, nausea, rectal pain and vomiting.   Endocrine: Negative for polydipsia, polyphagia and polyuria.   Musculoskeletal: Negative for gait problem.   Skin: Negative for rash and wound.   Allergic/Immunologic: Negative for food allergies.   Neurological: Negative for dizziness, speech difficulty and  light-headedness.   Psychiatric/Behavioral: Negative for confusion, self-injury, sleep disturbance and suicidal ideas.       Vitals:    02/10/23 1354   BP: 135/84   Pulse: 59   Temp: 97.3 °F (36.3 °C)   SpO2: 96%       Physical Exam  Constitutional:       General: She is not in acute distress.     Appearance: She is well-developed. She is not ill-appearing.   HENT:      Head: Normocephalic.   Eyes:      Pupils: Pupils are equal, round, and reactive to light.   Cardiovascular:      Rate and Rhythm: Normal rate and regular rhythm.      Heart sounds: Normal heart sounds.   Pulmonary:      Effort: Pulmonary effort is normal.      Breath sounds: Normal breath sounds.   Abdominal:      General: Bowel sounds are normal. There is distension.      Palpations: Abdomen is soft. There is no mass.      Tenderness: There is no abdominal tenderness. There is no guarding or rebound.      Hernia: No hernia is present.   Musculoskeletal:         General: Normal range of motion.   Skin:     General: Skin is warm and dry.   Neurological:      Mental Status: She is alert and oriented to person, place, and time.   Psychiatric:         Speech: Speech normal.         Behavior: Behavior normal.         Judgment: Judgment normal.         No radiology results for the last 7 days     Diagnoses and all orders for this visit:    1. Gastroesophageal reflux disease with esophagitis without hemorrhage (Primary)    2. History of Rojas's esophagus    3. Fatty liver    4. Adenomatous polyp of colon, unspecified part of colon  Overview:  Added automatically from request for surgery [id number]    Reviewed most recent lab work with her today.  Most recent LFTs were normal in 2020.  She needs to follow-up with her PCP and have her CMP drawn soon.  GERD does not sound well controlled currently.  I do wonder if she would benefit from moving the Protonix to evening since her worsening symptoms are at night?  Would also like her to work on eliminating some of  her GERD trigger drinks like her lattes and her teas.  For her irregular bowel habits I think starting daily fiber would be a good idea.  It would also help given her history of diverticulosis.  Okay to continue Carafate if needed.  Okay to stop it if its not helping.  It could be contributing to the constipation.  Patient to call the office next week with an update.  Patient to follow-up in 1 month.  Patient is agreeable to the plan.

## 2023-03-14 ENCOUNTER — OFFICE VISIT (OUTPATIENT)
Dept: GASTROENTEROLOGY | Facility: CLINIC | Age: 68
End: 2023-03-14
Payer: COMMERCIAL

## 2023-03-14 VITALS
OXYGEN SATURATION: 96 % | WEIGHT: 241.2 LBS | HEART RATE: 61 BPM | TEMPERATURE: 97.3 F | HEIGHT: 62 IN | BODY MASS INDEX: 44.39 KG/M2

## 2023-03-14 DIAGNOSIS — K21.9 GASTROESOPHAGEAL REFLUX DISEASE, UNSPECIFIED WHETHER ESOPHAGITIS PRESENT: Primary | ICD-10-CM

## 2023-03-14 DIAGNOSIS — K63.5 POLYP OF COLON, UNSPECIFIED PART OF COLON, UNSPECIFIED TYPE: ICD-10-CM

## 2023-03-14 PROCEDURE — 99214 OFFICE O/P EST MOD 30 MIN: CPT | Performed by: INTERNAL MEDICINE

## 2023-03-14 RX ORDER — FAMOTIDINE 20 MG/1
20 TABLET, FILM COATED ORAL NIGHTLY
Qty: 30 TABLET | Refills: 5 | Status: SHIPPED | OUTPATIENT
Start: 2023-03-14

## 2023-03-14 NOTE — PROGRESS NOTES
Chief Complaint   Patient presents with   • Heartburn        Kelle Soliz is a  68 y.o. female here for a follow up visit for GERD, Rojas's esophagus, polyps    HPI this 68-year-old white female patient of Dr. Ame Dempsey presents in follow-up since last seen in February of this year.  She continues to have upper abdominal symptoms associated with reflux despite the use of a proton pump inhibitor at bedtime.  She had tried Carafate without success.  Last upper and lower endoscopy were performed 2022.  She had elevated the head of her bed with the use of pillows but I explained to her she needed to be on a slant with the use of a wedge.  Also encouraged her not to eat anything for 4 hours prior to reclining.  We are going to try her on Protonix on the daytime and Pepcid at bedtime to see if this affords more symptomatic relief.  If her symptoms persist I would consider repeat upper endoscopic evaluation.    Past Medical History:   Diagnosis Date   • Anemia in mid-40s   • Arthritis    • Rojas esophagus May 2022   • Cholelithiasis around 2000   • Colon polyp     several each colonoscopy, last 10 years   • Diastolic dysfunction    • Disease of thyroid gland    • Diverticulosis    • Diverticulosis    • Fatty liver     slightly fatty   • GERD (gastroesophageal reflux disease)    • H. pylori infection    • Heart murmur    • Hemorrhoid    • Hernia     slight hiatal hernia   • Hx of colonic polyp    • Hyperlipidemia    • Hypertension    • Hypokalemia    • Hyponatremia    • Pericardial effusion    • PSVT (paroxysmal supraventricular tachycardia) ([facility])    • Sleep apnea     cpap - does not use       Current Outpatient Medications   Medication Sig Dispense Refill   • amLODIPine (NORVASC) 5 MG tablet TAKE 1 TABLET BY MOUTH DAILY. 90 tablet 3   • cholecalciferol (VITAMIN D3) 25 MCG (1000 UT) tablet Take 1 tablet by mouth Daily.     • Coenzyme Q10 (CO Q 10 PO) Take  by mouth.     • EDARBI 80 MG tablet tablet Take 1 tablet  by mouth Daily.     • escitalopram (LEXAPRO) 10 MG tablet Take 1 tablet by mouth Daily.     • estradiol (VAGIFEM) 10 MCG tablet vaginal tablet Insert 1 tablet into the vagina 2 (Two) Times a Week.     • levothyroxine (SYNTHROID, LEVOTHROID) 88 MCG tablet Take 1 tablet by mouth Daily.     • montelukast (SINGULAIR) 10 MG tablet Take 1 tablet by mouth Every Morning.     • nebivolol (BYSTOLIC) 20 MG tablet Take 1 tablet by mouth Daily.     • pantoprazole (PROTONIX) 40 MG EC tablet TAKE 1 TABLET BY MOUTH TWICE DAILY BEFORE MEAL(S) 60 tablet 3   • rosuvastatin (CRESTOR) 40 MG tablet Take 20 mg by mouth Daily.     • sucralfate (Carafate) 1 GM/10ML suspension Take 10 mL by mouth 4 (Four) Times a Day. (Patient not taking: Reported on 3/14/2023) 1200 mL 5     No current facility-administered medications for this visit.       PRN Meds:.    No Known Allergies    Social History     Socioeconomic History   • Marital status:    Tobacco Use   • Smoking status: Never   • Smokeless tobacco: Never   Substance and Sexual Activity   • Alcohol use: No   • Drug use: No   • Sexual activity: Yes     Partners: Male     Birth control/protection: None       Family History   Problem Relation Age of Onset   • Heart disease Mother    • Hypertension Mother    • Diabetes Mother    • Colon polyps Mother            • Heart disease Father    • Stroke Father         carotid artery disease   • Hyperlipidemia Father    • Alcohol abuse Father    • Heart disease Brother    • Diabetes Brother    • Hyperlipidemia Brother    • Hypertension Maternal Grandmother    • Malig Hyperthermia Neg Hx        Review of Systems   Constitutional: Negative for activity change, appetite change, fatigue and unexpected weight change.   HENT: Negative for congestion, facial swelling, sore throat, trouble swallowing and voice change.    Eyes: Negative for photophobia and visual disturbance.   Respiratory: Negative for cough and choking.    Cardiovascular: Negative  for chest pain.   Gastrointestinal: Negative for abdominal distention, abdominal pain, anal bleeding, blood in stool, constipation, diarrhea, nausea, rectal pain and vomiting.        GERD   Endocrine: Negative for polyphagia.   Musculoskeletal: Negative for arthralgias, gait problem and joint swelling.   Skin: Negative for color change, pallor and rash.   Allergic/Immunologic: Negative for food allergies.   Neurological: Negative for speech difficulty and headaches.   Hematological: Does not bruise/bleed easily.   Psychiatric/Behavioral: Negative for agitation, confusion and sleep disturbance.       Vitals:    03/14/23 1040   Pulse: 61   Temp: 97.3 °F (36.3 °C)   SpO2: 96%       Physical Exam  Constitutional:       Appearance: She is well-developed.   HENT:      Head: Normocephalic.   Eyes:      Conjunctiva/sclera: Conjunctivae normal.   Cardiovascular:      Rate and Rhythm: Normal rate and regular rhythm.   Pulmonary:      Breath sounds: Normal breath sounds.   Abdominal:      General: Bowel sounds are normal.      Palpations: Abdomen is soft.   Musculoskeletal:         General: Normal range of motion.      Cervical back: Normal range of motion.   Skin:     General: Skin is warm and dry.   Neurological:      Mental Status: She is alert and oriented to person, place, and time.   Psychiatric:         Behavior: Behavior normal.         ASSESSMENT   #1 GERD: Difficult to manage with proton pump inhibitor alone  #2 history of polyps      PLAN  Rx for Pepcid 20 mg at bedtime  Continue Protonix 40 mg daily  Call with a progress report in 2 weeks, if no improvement would offer upper endoscopic evaluation.      ICD-10-CM ICD-9-CM   1. Gastroesophageal reflux disease, unspecified whether esophagitis present  K21.9 530.81   2. Polyp of colon, unspecified part of colon, unspecified type  K63.5 211.3

## 2023-05-02 ENCOUNTER — TRANSCRIBE ORDERS (OUTPATIENT)
Dept: ADMINISTRATIVE | Facility: HOSPITAL | Age: 68
End: 2023-05-02
Payer: COMMERCIAL

## 2023-05-02 DIAGNOSIS — R10.11 ABDOMINAL PAIN, RIGHT UPPER QUADRANT: Primary | ICD-10-CM

## 2023-05-18 RX ORDER — PANTOPRAZOLE SODIUM 40 MG/1
TABLET, DELAYED RELEASE ORAL
Qty: 60 TABLET | Refills: 5 | Status: SHIPPED | OUTPATIENT
Start: 2023-05-18

## 2023-05-31 ENCOUNTER — HOSPITAL ENCOUNTER (OUTPATIENT)
Dept: ULTRASOUND IMAGING | Facility: HOSPITAL | Age: 68
Discharge: HOME | End: 2023-05-31
Admitting: INTERNAL MEDICINE

## 2023-05-31 DIAGNOSIS — R10.11 ABDOMINAL PAIN, RIGHT UPPER QUADRANT: ICD-10-CM

## 2023-05-31 PROCEDURE — 76705 ECHO EXAM OF ABDOMEN: CPT

## 2023-07-19 ENCOUNTER — TELEPHONE (OUTPATIENT)
Dept: GASTROENTEROLOGY | Facility: CLINIC | Age: 68
End: 2023-07-19
Payer: COMMERCIAL

## 2023-07-19 NOTE — TELEPHONE ENCOUNTER
"  Hub staff attempted to follow warm transfer process and was unsuccessful     Caller: Kelle Soliz \"GEO\"    Relationship to patient: Self    Best call back number: [phone]    Patient is needing: PATIENT CALLING TO UPDATE DR. VALLES ON CONDITION. PATIENT IS HAVING OCCASIONAL DISCOMFORT ON LEFT SIDE. AND REPORTS REFLUX IN THE MORNING IF SHE SLIPS OFF HER PILLOW THROUGH THE NIGHT. SHE WOULD LIKE TO KNOW IF DR. VALLES WOULD LIKE HER TO CONTINUE CURRENT MEDICATION REGIMENT OF PANTOPRAZOLE 40MG AND FAMOTIDINE. IF HE WOULD LIKE HER TO CONTINUE SHE WILL NEED ANOTHER PRE AUTHORIZATION FOR PANTOPRAZOLE SENT TO HER INSURANCE FOR THE CURRENT ONE EXPIRES ON 07/26/23.  THE NUMBER FOR HER INSURANCE FOR PRE AUTH IS [phone] [facility]. PLEASE REVIEW AND CONTACT PATIENT TO ADVISE.           "

## 2023-07-24 NOTE — TELEPHONE ENCOUNTER
July 24, 2023  Eddie Blackmon MD   to [facility] [facility] Pool       11:31 AM  Okay for patient to continue pantoprazole and famotidine.  May wish to get a foam wedge to elevate the head of the bed to avoid nocturnal symptoms.    **Call to pt.  Advise of above.  Verb understanding.     Message to MA's re: PA request. Fatimah FARIA RN.

## 2023-07-25 ENCOUNTER — TELEPHONE (OUTPATIENT)
Dept: GASTROENTEROLOGY | Facility: CLINIC | Age: 68
End: 2023-07-25
Payer: COMMERCIAL

## 2023-07-25 NOTE — TELEPHONE ENCOUNTER
"PA Submitted. PA Denied.  Key: [id number]  \"The requested medication is not covered under the [employer] formulary. These non-covered drugs have available covered options in the same therapeutic class which are listed along with information on the formulary exception process for [employer] online: <[email or web address]> The full approved drug list is available at: <[email or web address]>\"  "

## 2024-03-15 ENCOUNTER — TRANSCRIBE ORDERS (OUTPATIENT)
Dept: ADMINISTRATIVE | Facility: HOSPITAL | Age: 69
End: 2024-03-15
Payer: COMMERCIAL

## 2024-03-15 DIAGNOSIS — R06.00 DYSPNEA, UNSPECIFIED TYPE: Primary | ICD-10-CM

## 2024-03-15 DIAGNOSIS — Z83.6: ICD-10-CM

## 2024-03-19 ENCOUNTER — OFFICE VISIT (OUTPATIENT)
Dept: GASTROENTEROLOGY | Facility: CLINIC | Age: 69
End: 2024-03-19
Payer: COMMERCIAL

## 2024-03-19 VITALS
HEART RATE: 58 BPM | BODY MASS INDEX: 47.11 KG/M2 | WEIGHT: 256 LBS | DIASTOLIC BLOOD PRESSURE: 83 MMHG | SYSTOLIC BLOOD PRESSURE: 148 MMHG | HEIGHT: 62 IN | TEMPERATURE: 97.9 F | OXYGEN SATURATION: 97 %

## 2024-03-19 DIAGNOSIS — R10.12 LEFT UPPER QUADRANT ABDOMINAL PAIN: Primary | ICD-10-CM

## 2024-03-19 DIAGNOSIS — R10.11 RIGHT UPPER QUADRANT ABDOMINAL PAIN: ICD-10-CM

## 2024-03-19 PROCEDURE — 99214 OFFICE O/P EST MOD 30 MIN: CPT | Performed by: INTERNAL MEDICINE

## 2024-03-19 RX ORDER — SEMAGLUTIDE 0.25 MG/.5ML
INJECTION, SOLUTION SUBCUTANEOUS
COMMUNITY
Start: 2024-03-13

## 2024-03-19 NOTE — PROGRESS NOTES
Chief Complaint   Patient presents with    Heartburn     gerd    Abdominal Pain        Kelle Soliz is a  69 y.o. female here for a follow up visit for GERD, abdominal pain, history of polyps    HPI this 69-year-old female patient of Dr. Ame Dempsey presents since last needed March 2023.  She has issues with reflux and prior history of colon polyps.  She also complains of some abdominal pain specifically in the left upper quadrant of an intermittent nature that was described as a burning sensation and not directly related to dietary intake or bowel function.  We talked about evaluation to include CT scan of the abdomen and pelvis, mesenteric Doppler study to assess for possible ischemic etiology and consideration of repeat endoscopies although her last studies were performed in May 2022.  She had undergone previous ultrasound and CT scan with evidence of steatosis.    Past Medical History:   Diagnosis Date    Anemia in mid-40s    Arthritis     Rojas esophagus May 2022    Cholelithiasis around 2000    Colon polyp     several each colonoscopy, last 10 years    Diastolic dysfunction     Disease of thyroid gland     Diverticulosis     Diverticulosis     Fatty liver     slightly fatty    GERD (gastroesophageal reflux disease)     H. pylori infection     Heart murmur     Hemorrhoid     Hernia     slight hiatal hernia    Hx of colonic polyp     Hyperlipidemia     Hypertension     Hypokalemia     Hyponatremia     Pericardial effusion     PSVT (paroxysmal supraventricular tachycardia)     Sleep apnea     cpap - does not use       Current Outpatient Medications   Medication Sig Dispense Refill    amLODIPine (NORVASC) 5 MG tablet TAKE 1 TABLET BY MOUTH DAILY. 90 tablet 3    cholecalciferol (VITAMIN D3) 25 MCG (1000 UT) tablet Take 1 tablet by mouth Daily.      EDARBI 80 MG tablet tablet Take 1 tablet by mouth Daily.      escitalopram (LEXAPRO) 10 MG tablet Take 1 tablet by mouth Daily.      estradiol (VAGIFEM) 10 MCG  tablet vaginal tablet Insert 1 tablet into the vagina 2 (Two) Times a Week.      famotidine (Pepcid) 20 MG tablet Take 1 tablet by mouth Every Night. 30 tablet 5    levothyroxine (SYNTHROID, LEVOTHROID) 88 MCG tablet Take 1 tablet by mouth Daily.      montelukast (SINGULAIR) 10 MG tablet Take 1 tablet by mouth Every Morning.      nebivolol (BYSTOLIC) 20 MG tablet Take 1 tablet by mouth Daily.      pantoprazole (PROTONIX) 40 MG EC tablet TAKE 1 TABLET BY MOUTH TWICE DAILY BEFORE MEAL(S) 60 tablet 5    rosuvastatin (CRESTOR) 40 MG tablet Take 0.5 tablets by mouth Daily.      Wegovy 0.25 MG/0.5ML solution auto-injector       Coenzyme Q10 (CO Q 10 PO) Take  by mouth. (Patient not taking: Reported on 3/19/2024)      sucralfate (Carafate) 1 GM/10ML suspension Take 10 mL by mouth 4 (Four) Times a Day. (Patient not taking: Reported on 3/19/2024) 1200 mL 5     No current facility-administered medications for this visit.       PRN Meds:.    No Known Allergies    Social History     Socioeconomic History    Marital status:    Tobacco Use    Smoking status: Never    Smokeless tobacco: Never   Substance and Sexual Activity    Alcohol use: No    Drug use: No    Sexual activity: Yes     Partners: Male     Birth control/protection: None       Family History   Problem Relation Age of Onset    Heart disease Mother     Hypertension Mother     Diabetes Mother     Colon polyps Mother             Heart disease Father     Stroke Father         carotid artery disease    Hyperlipidemia Father     Alcohol abuse Father     Heart disease Brother     Diabetes Brother     Hyperlipidemia Brother     Hypertension Maternal Grandmother     Malig Hyperthermia Neg Hx        Review of Systems   Constitutional:  Negative for activity change, appetite change, fatigue and unexpected weight change.   HENT:  Negative for congestion, facial swelling, sore throat, trouble swallowing and voice change.    Eyes:  Negative for photophobia and visual  disturbance.   Respiratory:  Negative for cough and choking.    Cardiovascular:  Negative for chest pain.   Gastrointestinal:  Positive for abdominal pain. Negative for abdominal distention, anal bleeding, blood in stool, constipation, diarrhea, nausea, rectal pain and vomiting.        GERD   Endocrine: Negative for polyphagia.   Musculoskeletal:  Negative for arthralgias, gait problem and joint swelling.   Skin:  Negative for color change, pallor and rash.   Allergic/Immunologic: Negative for food allergies.   Neurological:  Negative for speech difficulty and headaches.   Hematological:  Does not bruise/bleed easily.   Psychiatric/Behavioral:  Negative for agitation, confusion and sleep disturbance.        Vitals:    03/19/24 1041   BP: 148/83   Pulse: 58   Temp: 97.9 °F (36.6 °C)   SpO2: 97%       Physical Exam  Constitutional:       Appearance: She is well-developed.   HENT:      Head: Normocephalic.   Eyes:      Conjunctiva/sclera: Conjunctivae normal.   Cardiovascular:      Rate and Rhythm: Normal rate and regular rhythm.   Pulmonary:      Breath sounds: Normal breath sounds.   Abdominal:      General: Bowel sounds are normal.      Palpations: Abdomen is soft.   Musculoskeletal:         General: Normal range of motion.      Cervical back: Normal range of motion.   Skin:     General: Skin is warm and dry.   Neurological:      Mental Status: She is alert and oriented to person, place, and time.   Psychiatric:         Behavior: Behavior normal.         ASSESSMENT   #1 abdominal pain: Etiology not well-defined may be gastrointestinal in origin but cannot eliminate other possible explanations  #2 history of polyps  #3 GERD      PLAN  Schedule CT scan of the abdomen and pelvis  Pending results may entertain mesenteric Doppler study or endoscopic assessment      ICD-10-CM ICD-9-CM   1. Left upper quadrant abdominal pain  R10.12 789.02   2. Right upper quadrant abdominal pain  R10.11 789.01

## 2024-04-04 ENCOUNTER — HOSPITAL ENCOUNTER (OUTPATIENT)
Dept: CT IMAGING | Facility: HOSPITAL | Age: 69
Discharge: HOME | End: 2024-04-04
Admitting: INTERNAL MEDICINE
Payer: COMMERCIAL

## 2024-04-04 DIAGNOSIS — R06.00 DYSPNEA, UNSPECIFIED TYPE: ICD-10-CM

## 2024-04-04 DIAGNOSIS — R10.12 LEFT UPPER QUADRANT ABDOMINAL PAIN: ICD-10-CM

## 2024-04-04 DIAGNOSIS — Z83.6: ICD-10-CM

## 2024-04-04 DIAGNOSIS — R10.11 RIGHT UPPER QUADRANT ABDOMINAL PAIN: ICD-10-CM

## 2024-04-04 LAB — CREAT BLDA-MCNC: 1 MG/DL (ref 0.6–1.3)

## 2024-04-04 PROCEDURE — 0 DIATRIZOATE MEGLUMINE & SODIUM PER 1 ML: Performed by: INTERNAL MEDICINE

## 2024-04-04 PROCEDURE — 25510000001 IOPAMIDOL 61 % SOLUTION: Performed by: INTERNAL MEDICINE

## 2024-04-04 PROCEDURE — 82565 ASSAY OF CREATININE: CPT

## 2024-04-04 PROCEDURE — 71250 CT THORAX DX C-: CPT

## 2024-04-04 PROCEDURE — 74177 CT ABD & PELVIS W/CONTRAST: CPT

## 2024-04-04 RX ADMIN — IOPAMIDOL 85 ML: 612 INJECTION, SOLUTION INTRAVENOUS at 13:57

## 2024-04-04 RX ADMIN — DIATRIZOATE MEGLUMINE AND DIATRIZOATE SODIUM 30 ML: 660; 100 LIQUID ORAL; RECTAL at 12:30

## 2024-04-09 ENCOUNTER — TELEPHONE (OUTPATIENT)
Dept: GASTROENTEROLOGY | Facility: CLINIC | Age: 69
End: 2024-04-09
Payer: COMMERCIAL

## 2024-04-09 DIAGNOSIS — R10.12 LEFT UPPER QUADRANT ABDOMINAL PAIN: Primary | ICD-10-CM

## 2024-04-09 DIAGNOSIS — R10.11 RIGHT UPPER QUADRANT ABDOMINAL PAIN: ICD-10-CM

## 2024-04-09 NOTE — TELEPHONE ENCOUNTER
Called pt and advised of Dr Blackmon's note. Verb understanding. Pt is willing to have doppler.  Order placed and update sent to Dr Blackmon.

## 2024-04-09 NOTE — TELEPHONE ENCOUNTER
----- Message from Eddie ELIZABETH MD sent at 4/8/2024  1:44 PM EDT -----  Regarding: CT scan results  Okay to notify patient CT scan of the abdomen and pelvis was not revealing as to the source of her pain although a lymph node was noted with undetermined significance.  Can offer mesenteric Doppler study (ultrasound) of the circulation to see if this shows any significant changes and if that is unrevealing would then offer endoscopic assessment.  ----- Message -----  From: [employer], Rad Results [place] In  Sent: 4/8/2024   9:57 AM EDT  To: Eddie ELIZABETH MD

## 2024-04-15 RX ORDER — PANTOPRAZOLE SODIUM 40 MG/1
TABLET, DELAYED RELEASE ORAL
Qty: 60 TABLET | Refills: 11 | Status: SHIPPED | OUTPATIENT
Start: 2024-04-15

## 2024-04-15 NOTE — TELEPHONE ENCOUNTER
Okay for pantoprazole refill 40 mg #60 to be taken twice daily and 11 refills per Dr Blackmon.     Script escribed as ordered above.

## 2024-04-26 ENCOUNTER — HOSPITAL ENCOUNTER (OUTPATIENT)
Dept: CARDIOLOGY | Facility: HOSPITAL | Age: 69
Discharge: HOME | End: 2024-04-26
Payer: COMMERCIAL

## 2024-04-26 LAB
BH CV VAS SMA AORTA EDV: 11.1 CM/S
BH CV VAS SMA AORTA PSV: 78.2 CM/S
BH CV VAS SMA CELIAC DIST EDV: 35.4 CM/S
BH CV VAS SMA CELIAC DIST PSV: 146 CM/S
BH CV VAS SMA CELIAC ORIGIN EDV: 33.1 CM/S
BH CV VAS SMA CELIAC ORIGIN PSV: 148 CM/S
BH CV VAS SMA CELIAC PROX EDV: 41.3 CM/S
BH CV VAS SMA CELIAC PROX PSV: 156 CM/S
BH CV VAS SMA HEPATIC EDV: 24.3 CM/S
BH CV VAS SMA HEPATIC PSV: 107 CM/S
BH CV VAS SMA IMA EDV: 11.8 CM/S
BH CV VAS SMA IMA PSV: 153 CM/S
BH CV VAS SMA ORIGIN EDV: 26.4 CM/S
BH CV VAS SMA ORIGIN PSV: 180 CM/S
BH CV VAS SMA SMA DIST EDV: 16.5 CM/S
BH CV VAS SMA SMA DIST PSV: 165 CM/S
BH CV VAS SMA SMA MID EDV: 16.2 CM/S
BH CV VAS SMA SMA MID PSV: 101 CM/S
BH CV VAS SMA SMA PROX EDV: 35.4 CM/S
BH CV VAS SMA SMA PROX PSV: 184 CM/S
BH CV VAS SMA SPLENIC EDV: 31.5 CM/S
BH CV VAS SMA SPLENIC PSV: 120 CM/S

## 2024-04-26 PROCEDURE — 93975 VASCULAR STUDY: CPT

## 2024-04-29 ENCOUNTER — TELEPHONE (OUTPATIENT)
Dept: GASTROENTEROLOGY | Facility: CLINIC | Age: 69
End: 2024-04-29
Payer: COMMERCIAL

## 2024-04-29 NOTE — TELEPHONE ENCOUNTER
----- Message from Eddie Blackmon MD sent at 4/26/2024  3:39 PM EDT -----  Regarding: Mesenteric duplex Doppler results  Okay to notify patient mesenteric duplex Doppler study was normal.  ----- Message -----  From: Dougie Nelson II, MD  Sent: 4/26/2024   9:50 AM EDT  To: Eddie ELIZABETH MD

## 2024-06-10 ENCOUNTER — TRANSCRIBE ORDERS (OUTPATIENT)
Dept: ADMINISTRATIVE | Facility: HOSPITAL | Age: 69
End: 2024-06-10
Payer: COMMERCIAL

## 2024-06-10 DIAGNOSIS — R59.0 VIRCHOW'S NODE: ICD-10-CM

## 2024-06-10 DIAGNOSIS — R91.1 PULMONARY NODULE: Primary | ICD-10-CM

## 2024-07-19 ENCOUNTER — HOSPITAL ENCOUNTER (OUTPATIENT)
Dept: CT IMAGING | Facility: HOSPITAL | Age: 69
Discharge: HOME | End: 2024-07-19
Admitting: INTERNAL MEDICINE
Payer: COMMERCIAL

## 2024-07-19 DIAGNOSIS — R59.0 VIRCHOW'S NODE: ICD-10-CM

## 2024-07-19 DIAGNOSIS — R91.1 PULMONARY NODULE: ICD-10-CM

## 2024-07-19 PROCEDURE — 74177 CT ABD & PELVIS W/CONTRAST: CPT

## 2024-07-19 PROCEDURE — 71250 CT THORAX DX C-: CPT

## 2024-07-19 PROCEDURE — 0 DIATRIZOATE MEGLUMINE & SODIUM PER 1 ML: Performed by: INTERNAL MEDICINE

## 2024-07-19 PROCEDURE — 25510000001 IOPAMIDOL 61 % SOLUTION: Performed by: INTERNAL MEDICINE

## 2024-07-19 RX ADMIN — DIATRIZOATE MEGLUMINE AND DIATRIZOATE SODIUM 30 ML: 660; 100 LIQUID ORAL; RECTAL at 10:20

## 2024-07-19 RX ADMIN — IOPAMIDOL 100 ML: 612 INJECTION, SOLUTION INTRAVENOUS at 11:00

## 2024-08-23 ENCOUNTER — OFFICE VISIT (OUTPATIENT)
Dept: SLEEP MEDICINE | Facility: HOSPITAL | Age: 69
End: 2024-08-23
Payer: COMMERCIAL

## 2024-08-23 VITALS — HEART RATE: 59 BPM | OXYGEN SATURATION: 97 % | HEIGHT: 62 IN | BODY MASS INDEX: 42.14 KG/M2 | WEIGHT: 229 LBS

## 2024-08-23 DIAGNOSIS — E66.01 MORBID OBESITY: ICD-10-CM

## 2024-08-23 DIAGNOSIS — G47.33 OBSTRUCTIVE SLEEP APNEA: Primary | ICD-10-CM

## 2024-08-23 PROCEDURE — G0463 HOSPITAL OUTPT CLINIC VISIT: HCPCS

## 2024-08-23 PROCEDURE — 99214 OFFICE O/P EST MOD 30 MIN: CPT | Performed by: FAMILY MEDICINE

## 2024-08-23 NOTE — PROGRESS NOTES
"[facility] Up [facility] Note     Chief Complaint:  DELIA     Primary Care Physician: Ame Dempsey MD    Interval History:   The patient is a 69 y.o. female  who was last seen in the sleep lab: 7/5/2023.  Presents today for annual follow-up.  2016 study showed AHI 11.5 supine AHI 42.  Advised auto CPAP 6-20 cm H2O.  Today reports no pauses mask machine hypersomnia nonrestorative sleep nasal pillow mask fits well does not leak air no dry mouth.    Downloaded PAP Data Reviewed For Compliance:  DME is naps.  Downloads between 5/23/2024 - 8/20/2024.  Average usage is 7 hours 26 minutes.  Average AHI is 0.5.  Average auto CPAP pressure is 6.4 cm H2O    I have reviewed the above results and compared them with the patient's last downloads and reviewed with the patient.    Review of Systems:    A complete review of systems was done and all were negative with the exception of postnasal drip    Social History:    Social History     Socioeconomic History    Marital status:    Tobacco Use    Smoking status: Never    Smokeless tobacco: Never   Substance and Sexual Activity    Alcohol use: No    Drug use: No    Sexual activity: Yes     Partners: Male     Birth control/protection: None       Allergies:  Patient has no known allergies.     Medication Review:  Reviewed.      Vital Signs:    Vitals:    08/23/24 0900   Pulse: 59   SpO2: 97%   Weight: 104 kg (229 lb)   Height: 157.5 cm (62.01\")     Body mass index is 41.87 kg/m².    Physical Exam:    Constitutional:  Well developed 69 y.o. female that appears in no apparent distress.  Awake & oriented times 3.  Normal mood with normal recent and remote memory and normal judgement.  Eyes:  Conjunctivae normal.  Oropharynx: Previously, moist mucous membranes.    Self-administered [facility] Sleepiness Scale test results: 5  0-5 Lower normal daytime sleepiness  6-10 Higher normal daytime sleepiness  11-12 Mild, 13-15 Moderate, & 16-24 Severe excessive daytime " sleepiness    Impression:   1. Obstructive sleep apnea    2. Morbid obesity        Obstructive sleep apnea adequately treated with auto CPAP 6-20 cm H2O. The patient appears to be at goal with good compliance and usage. The patient has no complaints of hypersomnolence.    Plan:  Good sleep hygiene measures should be maintained.  Weight loss would be beneficial in this patient who morbidly obese by Body mass index is 41.87 kg/m²..      After evaluating the patient and assessing results available, the patient is benefiting from the treatment being provided.     The patient will continue with CPAP.  After clinical evaluation and review of downloads, I recommend changing to set P of 5 cm H2O to help with air leak into her eyes; if she does not like it we can change back and she can consider CPAP pillow and/or mask fitting.  A new prescription will be sent to the patient's DME.    Caution during activities that require prolonged concentration is strongly advised if sleepiness returns. Changing of PAP supplies regularly is important for effective use. Patient needs to change cushion on the mask or plugs on nasal pillows along with disposable filters once every month and change mask frame, tubing, headgear and Velcro straps every 6 months at the minimum.    I answered all of the patient's questions.  The patient will call for any problems and will follow up in 1 year.      Annabel Loaiza MD  Sleep Medicine  08/23/24  10:02 EDT

## 2024-09-18 ENCOUNTER — TRANSCRIBE ORDERS (OUTPATIENT)
Dept: ADMINISTRATIVE | Facility: HOSPITAL | Age: 69
End: 2024-09-18
Payer: COMMERCIAL

## 2024-09-18 DIAGNOSIS — R93.5 ABNORMAL FINDINGS ON DIAGNOSTIC IMAGING OF ABDOMEN: Primary | ICD-10-CM

## 2024-10-21 ENCOUNTER — HOSPITAL ENCOUNTER (OUTPATIENT)
Dept: CT IMAGING | Facility: HOSPITAL | Age: 69
Discharge: HOME | End: 2024-10-21
Admitting: INTERNAL MEDICINE
Payer: COMMERCIAL

## 2024-10-21 DIAGNOSIS — R93.5 ABNORMAL FINDINGS ON DIAGNOSTIC IMAGING OF ABDOMEN: ICD-10-CM

## 2024-10-21 PROCEDURE — 0 DIATRIZOATE MEGLUMINE & SODIUM PER 1 ML: Performed by: INTERNAL MEDICINE

## 2024-10-21 PROCEDURE — 74177 CT ABD & PELVIS W/CONTRAST: CPT

## 2024-10-21 PROCEDURE — 25510000001 IOPAMIDOL 61 % SOLUTION: Performed by: INTERNAL MEDICINE

## 2024-10-21 RX ORDER — DIATRIZOATE MEGLUMINE AND DIATRIZOATE SODIUM 660; 100 MG/ML; MG/ML
30 SOLUTION ORAL; RECTAL
Status: COMPLETED | OUTPATIENT
Start: 2024-10-21 | End: 2024-10-21

## 2024-10-21 RX ORDER — IOPAMIDOL 612 MG/ML
100 INJECTION, SOLUTION INTRAVASCULAR
Status: COMPLETED | OUTPATIENT
Start: 2024-10-21 | End: 2024-10-21

## 2024-10-21 RX ADMIN — IOPAMIDOL 85 ML: 612 INJECTION, SOLUTION INTRAVENOUS at 13:15

## 2024-10-21 RX ADMIN — DIATRIZOATE MEGLUMINE AND DIATRIZOATE SODIUM 30 ML: 660; 100 LIQUID ORAL; RECTAL at 12:00

## 2024-11-14 ENCOUNTER — TELEPHONE (OUTPATIENT)
Dept: SLEEP MEDICINE | Facility: HOSPITAL | Age: 69
End: 2024-11-14
Payer: COMMERCIAL

## 2024-11-14 NOTE — TELEPHONE ENCOUNTER
I called Pt and let her know Dr harris had went over her compliance report and her cpap machine was taking care of her apnea on current settings

## 2024-11-18 ENCOUNTER — TRANSCRIBE ORDERS (OUTPATIENT)
Dept: ADMINISTRATIVE | Facility: HOSPITAL | Age: 69
End: 2024-11-18
Payer: COMMERCIAL

## 2024-11-18 DIAGNOSIS — R91.1 LUNG NODULE: Primary | ICD-10-CM

## 2025-01-21 ENCOUNTER — HOSPITAL ENCOUNTER (OUTPATIENT)
Dept: CT IMAGING | Facility: HOSPITAL | Age: 70
Discharge: HOME | End: 2025-01-21
Admitting: INTERNAL MEDICINE
Payer: COMMERCIAL

## 2025-01-21 DIAGNOSIS — R91.1 LUNG NODULE: ICD-10-CM

## 2025-01-21 PROCEDURE — 71250 CT THORAX DX C-: CPT

## 2025-06-03 RX ORDER — PANTOPRAZOLE SODIUM 40 MG/1
40 TABLET, DELAYED RELEASE ORAL
Qty: 60 TABLET | Refills: 0 | OUTPATIENT
Start: 2025-06-03

## 2025-06-09 ENCOUNTER — TELEPHONE (OUTPATIENT)
Dept: GASTROENTEROLOGY | Facility: CLINIC | Age: 70
End: 2025-06-09

## 2025-06-09 RX ORDER — PANTOPRAZOLE SODIUM 40 MG/1
40 TABLET, DELAYED RELEASE ORAL
Qty: 60 TABLET | Refills: 2 | Status: SHIPPED | OUTPATIENT
Start: 2025-06-09

## 2025-06-09 RX ORDER — PANTOPRAZOLE SODIUM 40 MG/1
40 TABLET, DELAYED RELEASE ORAL
Qty: 60 TABLET | Refills: 0 | OUTPATIENT
Start: 2025-06-09

## 2025-06-09 NOTE — TELEPHONE ENCOUNTER
Called pt and advised that we have refilled her pantoprazole 40mg po bid to [facility] pharmacy.  Pt reports that she may need a pa for this med  the end of July.  ADvised pt let us know if this should occur.  Verb understanding.

## 2025-06-09 NOTE — TELEPHONE ENCOUNTER
Pt called and I scheduled her for her yearly appt with Elvia on 8/22.  She needs to get her pantoprazole refilled.

## 2025-07-07 ENCOUNTER — TELEPHONE (OUTPATIENT)
Dept: GASTROENTEROLOGY | Facility: CLINIC | Age: 70
End: 2025-07-07
Payer: COMMERCIAL

## 2025-07-07 NOTE — TELEPHONE ENCOUNTER
PA approved  06/07/2025 through 07/07/2026    Patient notified via [employer]  
PA submitted via cmm Key: [id number]  
[id number]

## 2025-08-15 ENCOUNTER — OFFICE VISIT (OUTPATIENT)
Dept: SLEEP MEDICINE | Facility: HOSPITAL | Age: 70
End: 2025-08-15
Payer: COMMERCIAL

## 2025-08-15 VITALS
SYSTOLIC BLOOD PRESSURE: 138 MMHG | OXYGEN SATURATION: 94 % | BODY MASS INDEX: 43.98 KG/M2 | DIASTOLIC BLOOD PRESSURE: 84 MMHG | HEIGHT: 62 IN | WEIGHT: 239 LBS | HEART RATE: 61 BPM

## 2025-08-15 DIAGNOSIS — E66.01 SEVERE OBESITY (BMI >= 40): ICD-10-CM

## 2025-08-15 DIAGNOSIS — G47.33 OBSTRUCTIVE SLEEP APNEA: Primary | ICD-10-CM

## 2025-08-15 PROCEDURE — G0463 HOSPITAL OUTPT CLINIC VISIT: HCPCS

## 2025-08-22 ENCOUNTER — OFFICE VISIT (OUTPATIENT)
Dept: GASTROENTEROLOGY | Facility: CLINIC | Age: 70
End: 2025-08-22
Payer: COMMERCIAL

## 2025-08-22 ENCOUNTER — TELEPHONE (OUTPATIENT)
Dept: GASTROENTEROLOGY | Facility: CLINIC | Age: 70
End: 2025-08-22
Payer: COMMERCIAL

## 2025-08-22 VITALS
WEIGHT: 241 LBS | DIASTOLIC BLOOD PRESSURE: 82 MMHG | BODY MASS INDEX: 44.35 KG/M2 | SYSTOLIC BLOOD PRESSURE: 139 MMHG | HEIGHT: 62 IN | TEMPERATURE: 96.4 F | HEART RATE: 52 BPM

## 2025-08-22 DIAGNOSIS — K21.9 GASTROESOPHAGEAL REFLUX DISEASE, UNSPECIFIED WHETHER ESOPHAGITIS PRESENT: Primary | ICD-10-CM

## 2025-08-22 DIAGNOSIS — K22.70 BARRETT'S ESOPHAGUS WITHOUT DYSPLASIA: ICD-10-CM

## 2025-08-22 PROCEDURE — 99214 OFFICE O/P EST MOD 30 MIN: CPT | Performed by: PHYSICIAN ASSISTANT

## 2025-08-22 RX ORDER — PANTOPRAZOLE SODIUM 40 MG/1
40 TABLET, DELAYED RELEASE ORAL DAILY
Qty: 90 TABLET | Refills: 3 | Status: SHIPPED | OUTPATIENT
Start: 2025-08-22

## 2025-08-22 RX ORDER — CLOBETASOL PROPIONATE 0.5 MG/G
CREAM TOPICAL
COMMUNITY

## 2025-08-22 RX ORDER — VALSARTAN 320 MG/1
320 TABLET ORAL DAILY
COMMUNITY

## 2025-08-22 RX ORDER — CLOBETASOL PROPIONATE 0.5 MG/G
1 OINTMENT TOPICAL 2 TIMES DAILY
COMMUNITY

## (undated) DEVICE — ZIP 16 SURGICAL SKIN CLOSURE DEVICE, PSA: Brand: ZIP 16 SURGICAL SKIN CLOSURE DEVICE

## (undated) DEVICE — ANTIBACTERIAL UNDYED BRAIDED (POLYGLACTIN 910), SYNTHETIC ABSORBABLE SUTURE: Brand: COATED VICRYL

## (undated) DEVICE — APPL DURAPREP IODOPHOR APL 26ML

## (undated) DEVICE — BNDG ELAS ELITE V/CLOSE 6IN 5YD LF STRL

## (undated) DEVICE — MSK PROC CURAPLEX O2 2/ADAPT 7FT

## (undated) DEVICE — GENESIS TROCHLEAR PIN 1/8 X 3: Brand: GENESIS

## (undated) DEVICE — LN SMPL CO2 SHTRM SD STREAM W/M LUER

## (undated) DEVICE — ENCORE® LATEX ORTHO SIZE 8.5, STERILE LATEX POWDER-FREE SURGICAL GLOVE: Brand: ENCORE

## (undated) DEVICE — TUBING, SUCTION, 1/4" X 10', STRAIGHT: Brand: MEDLINE

## (undated) DEVICE — SYR LUERLOK 20CC

## (undated) DEVICE — UNDERCAST PADDING: Brand: DEROYAL

## (undated) DEVICE — Device: Brand: DEFENDO AIR/WATER/SUCTION AND BIOPSY VALVE

## (undated) DEVICE — FRCP BX RADJAW4 NDL 2.8 240CM LG OG BX40

## (undated) DEVICE — KT DRN EVAC WND PVC PCH WTROC RND 10F400

## (undated) DEVICE — CANN NASL CO2 TRULINK W/O2 A/

## (undated) DEVICE — KT ORCA ORCAPOD DISP STRL

## (undated) DEVICE — DRSNG WND GZ CURAD OIL EMULSION 3X8IN LF STRL 1PK

## (undated) DEVICE — CANN O2 ETCO2 FITS ALL CONN CO2 SMPL A/ 7IN DISP LF

## (undated) DEVICE — BANDAGE,GAUZE,BULKEE II,4.5"X4.1YD,STRL: Brand: MEDLINE

## (undated) DEVICE — GAUZE,SPONGE,FLUFF,6"X6.75",STRL,10/TRAY: Brand: MEDLINE

## (undated) DEVICE — ADAPT CLN BIOGUARD AIR/H2O DISP

## (undated) DEVICE — PK KN TOTL 40

## (undated) DEVICE — SENSR O2 OXIMAX FNGR A/ 18IN NONSTR

## (undated) DEVICE — SOL NACL 0.9PCT 1000ML

## (undated) DEVICE — NDL SPINE 20G 3 1/2 YEL STRL 1P/U

## (undated) DEVICE — GLV SURG SENSICARE W/ALOE PF LF 9 STRL

## (undated) DEVICE — GLV SURG TRIUMPH CLASSIC PF LTX 8.5 STRL

## (undated) DEVICE — DRSNG ADAPTIC 3X8

## (undated) DEVICE — 2108 SERIES SAGITTAL BLADE, NO OFFSET  (12.4 X 1.19 X 82.1MM)

## (undated) DEVICE — STPLR SKIN VISISTAT WD 35CT

## (undated) DEVICE — GAUZE,SPONGE,FLUFF,6"X6.75",STRL,5/TRAY: Brand: MEDLINE

## (undated) DEVICE — ENCORE® LATEX ORTHO SIZE 9, STERILE LATEX POWDER-FREE SURGICAL GLOVE: Brand: ENCORE

## (undated) DEVICE — BITEBLOCK OMNI BLOC

## (undated) DEVICE — DUAL CUT SAGITTAL BLADE

## (undated) DEVICE — THE TORRENT IRRIGATION SCOPE CONNECTOR IS USED WITH THE TORRENT IRRIGATION TUBING TO PROVIDE IRRIGATION FLUIDS SUCH AS STERILE WATER DURING GASTROINTESTINAL ENDOSCOPIC PROCEDURES WHEN USED IN CONJUNCTION WITH AN IRRIGATION PUMP (OR ELECTROSURGICAL UNIT).: Brand: TORRENT